# Patient Record
Sex: MALE | Race: WHITE | NOT HISPANIC OR LATINO | Employment: PART TIME | URBAN - METROPOLITAN AREA
[De-identification: names, ages, dates, MRNs, and addresses within clinical notes are randomized per-mention and may not be internally consistent; named-entity substitution may affect disease eponyms.]

---

## 2017-04-19 ENCOUNTER — TRANSFERRED RECORDS (OUTPATIENT)
Dept: HEALTH INFORMATION MANAGEMENT | Facility: CLINIC | Age: 62
End: 2017-04-19

## 2017-06-22 ENCOUNTER — OFFICE VISIT (OUTPATIENT)
Dept: INTERNAL MEDICINE | Facility: CLINIC | Age: 62
End: 2017-06-22
Payer: COMMERCIAL

## 2017-06-22 VITALS
TEMPERATURE: 97.7 F | OXYGEN SATURATION: 97 % | BODY MASS INDEX: 27.36 KG/M2 | SYSTOLIC BLOOD PRESSURE: 106 MMHG | DIASTOLIC BLOOD PRESSURE: 70 MMHG | HEIGHT: 63 IN | HEART RATE: 93 BPM | WEIGHT: 154.4 LBS

## 2017-06-22 DIAGNOSIS — Z12.11 COLON CANCER SCREENING: ICD-10-CM

## 2017-06-22 DIAGNOSIS — Z00.00 ENCOUNTER FOR ROUTINE ADULT HEALTH EXAMINATION WITHOUT ABNORMAL FINDINGS: Primary | ICD-10-CM

## 2017-06-22 DIAGNOSIS — N52.9 IMPOTENCE OF ORGANIC ORIGIN: ICD-10-CM

## 2017-06-22 DIAGNOSIS — Z12.5 SPECIAL SCREENING FOR MALIGNANT NEOPLASM OF PROSTATE: ICD-10-CM

## 2017-06-22 DIAGNOSIS — K21.9 GASTROESOPHAGEAL REFLUX DISEASE WITHOUT ESOPHAGITIS: ICD-10-CM

## 2017-06-22 DIAGNOSIS — I10 ESSENTIAL HYPERTENSION, BENIGN: ICD-10-CM

## 2017-06-22 DIAGNOSIS — Z71.89 COUNSELING REGARDING ADVANCED DIRECTIVES: ICD-10-CM

## 2017-06-22 DIAGNOSIS — Z11.59 ENCOUNTER FOR HCV SCREENING TEST FOR LOW RISK PATIENT: ICD-10-CM

## 2017-06-22 DIAGNOSIS — E78.5 HYPERLIPIDEMIA LDL GOAL <130: ICD-10-CM

## 2017-06-22 LAB
ALBUMIN SERPL-MCNC: 4.6 G/DL (ref 3.4–5)
ALP SERPL-CCNC: 78 U/L (ref 40–150)
ALT SERPL W P-5'-P-CCNC: 43 U/L (ref 0–70)
ANION GAP SERPL CALCULATED.3IONS-SCNC: 10 MMOL/L (ref 3–14)
AST SERPL W P-5'-P-CCNC: 26 U/L (ref 0–45)
BILIRUB SERPL-MCNC: 0.6 MG/DL (ref 0.2–1.3)
BUN SERPL-MCNC: 18 MG/DL (ref 7–30)
CALCIUM SERPL-MCNC: 10.3 MG/DL (ref 8.5–10.1)
CHLORIDE SERPL-SCNC: 102 MMOL/L (ref 94–109)
CHOLEST SERPL-MCNC: 232 MG/DL
CO2 SERPL-SCNC: 24 MMOL/L (ref 20–32)
CREAT SERPL-MCNC: 0.99 MG/DL (ref 0.66–1.25)
GFR SERPL CREATININE-BSD FRML MDRD: 76 ML/MIN/1.7M2
GLUCOSE SERPL-MCNC: 151 MG/DL (ref 70–99)
HDLC SERPL-MCNC: 63 MG/DL
HGB BLD-MCNC: 13.4 G/DL (ref 13.3–17.7)
LDLC SERPL CALC-MCNC: 149 MG/DL
NONHDLC SERPL-MCNC: 169 MG/DL
POTASSIUM SERPL-SCNC: 4.1 MMOL/L (ref 3.4–5.3)
PROT SERPL-MCNC: 8.1 G/DL (ref 6.8–8.8)
PSA SERPL-ACNC: 4.61 UG/L (ref 0–4)
SODIUM SERPL-SCNC: 136 MMOL/L (ref 133–144)
TRIGL SERPL-MCNC: 102 MG/DL

## 2017-06-22 PROCEDURE — 85018 HEMOGLOBIN: CPT | Performed by: INTERNAL MEDICINE

## 2017-06-22 PROCEDURE — 80061 LIPID PANEL: CPT | Performed by: INTERNAL MEDICINE

## 2017-06-22 PROCEDURE — G0103 PSA SCREENING: HCPCS | Performed by: INTERNAL MEDICINE

## 2017-06-22 PROCEDURE — 99396 PREV VISIT EST AGE 40-64: CPT | Performed by: INTERNAL MEDICINE

## 2017-06-22 PROCEDURE — 80053 COMPREHEN METABOLIC PANEL: CPT | Performed by: INTERNAL MEDICINE

## 2017-06-22 PROCEDURE — 36415 COLL VENOUS BLD VENIPUNCTURE: CPT | Performed by: INTERNAL MEDICINE

## 2017-06-22 RX ORDER — SILDENAFIL 100 MG/1
100 TABLET, FILM COATED ORAL DAILY PRN
Qty: 12 TABLET | Refills: 3 | Status: SHIPPED | OUTPATIENT
Start: 2017-06-22 | End: 2018-06-25

## 2017-06-22 RX ORDER — LOSARTAN POTASSIUM AND HYDROCHLOROTHIAZIDE 12.5; 1 MG/1; MG/1
1 TABLET ORAL DAILY
Qty: 90 TABLET | Refills: 3 | Status: SHIPPED | OUTPATIENT
Start: 2017-06-22 | End: 2018-06-24

## 2017-06-22 RX ORDER — AMLODIPINE BESYLATE 10 MG/1
10 TABLET ORAL DAILY
Qty: 90 TABLET | Refills: 3 | Status: SHIPPED | OUTPATIENT
Start: 2017-06-22 | End: 2018-06-24

## 2017-06-22 RX ORDER — GEMFIBROZIL 600 MG/1
600 TABLET, FILM COATED ORAL 2 TIMES DAILY
Qty: 180 TABLET | Refills: 3 | Status: SHIPPED | OUTPATIENT
Start: 2017-06-22 | End: 2018-06-24

## 2017-06-22 NOTE — MR AVS SNAPSHOT
After Visit Summary   6/22/2017    Deven Hernandes    MRN: [id number]           Patient Information     Date Of Birth          1955        Visit Information        Provider Department      6/22/2017 6:40 AM Garrett Clifford MD [facility]        Today's Diagnoses     Encounter for routine adult health examination without abnormal findings    -  1    BENIGN HYPERTENSION        Hyperlipidemia LDL goal <130        Gastroesophageal reflux disease without esophagitis        Special screening for malignant neoplasm of prostate        Colon cancer screening        Impotence of organic origin        Counseling regarding advanced directives          Care Instructions      Preventive Health Recommendations  Male Ages 50 - 64    Yearly exam:             See your health care provider every year in order to  o   Review health changes.   o   Discuss preventive care.    o   Review your medicines if your doctor has prescribed any.     Have a cholesterol test every 5 years, or more frequently if you are at risk for high cholesterol/heart disease.     Have a diabetes test (fasting glucose) every three years. If you are at risk for diabetes, you should have this test more often.     Have a colonoscopy at age 50, or have a yearly FIT test (stool test). These exams will check for colon cancer.      Talk with your health care provider about whether or not a prostate cancer screening test (PSA) is right for you.    You should be tested each year for STDs (sexually transmitted diseases), if you re at risk.     Shots: Get a flu shot each year. Get a tetanus shot every 10 years.     Nutrition:    Eat at least 5 servings of fruits and vegetables daily.     Eat whole-grain bread, whole-wheat pasta and brown rice instead of white grains and rice.     Talk to your provider about Calcium and Vitamin D.     Lifestyle    Exercise for at least 150 minutes a week (30 minutes a day, 5 days a week). This  "will help you control your weight and prevent disease.     Limit alcohol to one drink per day.     No smoking.     Wear sunscreen to prevent skin cancer.     See your dentist every six months for an exam and cleaning.     See your eye doctor every 1 to 2 years.            Follow-ups after your visit        Who to contact     If you have questions or need follow up information about today's clinic visit or your schedule please contact [facility] directly at [phone].  Normal or non-critical lab and imaging results will be communicated to you by [employer], letter or phone within 4 business days after the clinic has received the results. If you do not hear from us within 7 days, please contact the clinic through [employer] or phone. If you have a critical or abnormal lab result, we will notify you by phone as soon as possible.  Submit refill requests through [employer] or call your pharmacy and they will forward the refill request to us. Please allow 3 business days for your refill to be completed.          Additional Information About Your Visit        [employer] Information     [employer] gives you secure access to your electronic health record. If you see a primary care provider, you can also send messages to your care team and make appointments. If you have questions, please call your primary care clinic.  If you do not have a primary care provider, please call [phone] and they will assist you.        Care EveryWhere ID     This is your Care EveryWhere ID. This could be used by other organizations to access your [facility] medical records  [id number]        Your Vitals Were     Pulse Temperature Height Pulse Oximetry BMI (Body Mass Index)       93 97.7  F (36.5  C) (Oral) 5' 3\" (1.6 m) 97% 27.35 kg/m2        Blood Pressure from Last 3 Encounters:   06/22/17 106/70   06/14/16 116/78   03/17/15 128/80    Weight from Last 3 Encounters:   06/22/17 154 lb 6.4 oz (70 kg)   06/14/16 148 lb 6.4 oz (67.3 " kg)   03/17/15 149 lb 12.8 oz (67.9 kg)              We Performed the Following     Comprehensive metabolic panel     Hemoglobin     Lipid Profile     PSA, screen          Where to get your medicines      These medications were sent to [id number] - [place], [place] - [address] AT [facility] [address], [facility] [address]     Phone:  [phone]     amLODIPine 10 MG tablet    gemfibrozil 600 MG tablet    losartan-hydrochlorothiazide 100-12.5 MG per tablet    omeprazole 20 MG CR capsule         Some of these will need a paper prescription and others can be bought over the counter.  Ask your nurse if you have questions.     Bring a paper prescription for each of these medications     sildenafil 100 MG cap/tab          Primary Care Provider Office Phone # Fax #    Garrett Clifford [phone]       [facility] [address] [facility] [address]        Equal Access to Services     IVANIA CANCINO : Hadii aad ku hadasho Soomaali, waaxda luqadaha, qaybta kaalmada adeegyada, waxay idiin hayalexn rolly sims . So [facility] [phone].    ATENCIÓN: Si habla español, tiene a arevalo disposición servicios gratuitos de asistencia lingüística. Llame al [phone].    We comply with applicable federal civil rights laws and [place] laws. We do not discriminate on the basis of race, color, national origin, age, disability sex, sexual orientation or gender identity.            Thank you!     Thank you for choosing [facility]  for your care. Our goal is always to provide you with excellent care. Hearing back from our patients is one way we can continue to improve our services. Please take a few minutes to complete the written survey that you may receive in the mail after your visit with us. Thank you!             Your Updated Medication List - Protect others around you: Learn how to safely use, store and throw away your  medicines at www.disposemymeds.org.          This list is accurate as of: 6/22/17  7:01 AM.  Always use your most recent med list.                   Brand Name Dispense Instructions for use Diagnosis    amLODIPine 10 MG tablet    NORVASC    90 tablet    Take 1 tablet (10 mg) by mouth daily    Essential hypertension, benign       aspirin 81 MG tablet     0    1 tab po QD (Once per day)        gemfibrozil 600 MG tablet    LOPID    180 tablet    Take 1 tablet (600 mg) by mouth 2 times daily    Hyperlipidemia LDL goal <130       losartan-hydrochlorothiazide 100-12.5 MG per tablet    HYZAAR    90 tablet    Take 1 tablet by mouth daily    Essential hypertension, benign       METAMUCIL 30.9 % Powd   Generic drug:  psyllium     0    as directed        MULTIVITAMIN CHEW   OR      1 tablet qd        omeprazole 20 MG CR capsule    priLOSEC    90 capsule    ONE DAILY    Gastroesophageal reflux disease without esophagitis       polyethylene glycol powder    MIRALAX     Take 17 g by mouth daily. for constipation    Chronic constipation       sildenafil 100 MG cap/tab    VIAGRA    12 tablet    Take 1 tablet (100 mg) by mouth daily as needed for erectile dysfunction    Impotence of organic origin

## 2017-06-22 NOTE — NURSING NOTE
"Chief Complaint   Patient presents with     Physical       Initial /70  Pulse 93  Temp 97.7  F (36.5  C) (Oral)  Ht 5' 3\" (1.6 m)  Wt 154 lb 6.4 oz (70 kg)  SpO2 97%  BMI 27.35 kg/m2 Estimated body mass index is 27.35 kg/(m^2) as calculated from the following:    Height as of this encounter: 5' 3\" (1.6 m).    Weight as of this encounter: 154 lb 6.4 oz (70 kg).  Medication Reconciliation: complete   Lisbeth Nicole CMA      "

## 2017-06-22 NOTE — LETTER
[facility]  [address]  [address]      June 22, 2017      Deven Hernandes  [address] [facility] [address]          Dear Deven,      I have enclosed a copy of your most recent labs done here at the clinic and if available some of your prior labs for comparison.     I am pleased to inform you that your routine blood work including your hemoglobin, sodium, potassium, kidney and liver function tests are all normal.    Your blood sugar level is elevated and needs to be addressed as at its current level is consistent with a potential diagnosis of diabetes and could be better controlled.  Please follow-up in the clinic to discuss some changes that need to be done.    Your PSA test is also elevated higher than what I would consider to be normal and I would ask you follow-up to discuss this further.    Your cholesterol is slightly high and could be treated more aggressively.  Please follow-up with me to discuss your further options if you are interested.    Please call me if you have further questions.        Garrett Clifford MD

## 2017-06-22 NOTE — PROGRESS NOTES
SUBJECTIVE:     CC: Deven Hernandes is an 61 year old male who presents for preventative health visit.     Answers for HPI/ROS submitted by the patient on 6/21/2017   Annual Exam:  Getting at least 3 servings of Calcium per day:: Yes  Bi-annual eye exam:: Yes  Dental care twice a year:: Yes  Sleep apnea or symptoms of sleep apnea:: Daytime drowsiness  Diet:: Regular (no restrictions)  Frequency of exercise:: 2-3 days/week  Taking medications regularly:: Yes  Medication side effects:: Not applicable  Additional concerns today:: YES  PHQ-2 Score: 0  Duration of exercise:: 15-30 minutes    PROBLEMS TO ADD ON...    1. Discuss dietary supplements  2. Needs hard copy of Viagra RX    Today's PHQ-2 Score:   PHQ-2 ( 1999 Pfizer) 6/21/2017 6/14/2016   Q1: Little interest or pleasure in doing things 0 0   Q2: Feeling down, depressed or hopeless 0 0   PHQ-2 Score 0 0   Q1: Little interest or pleasure in doing things Not at all -   Q2: Feeling down, depressed or hopeless Not at all -   PHQ-2 Score 0 -       Abuse: Current or Past(Physical, Sexual or Emotional)- No  Do you feel safe in your environment - Yes    Social History   Substance Use Topics     Smoking status: Former Smoker     Packs/day: 0.25     Years: 20.00     Types: Cigarettes     Quit date: 11/17/2003     Smokeless tobacco: Never Used     Alcohol use Yes      Comment: socially     The patient does not drink >3 drinks per day nor >7 drinks per week.    Last PSA:   PSA   Date Value Ref Range Status   06/14/2016 3.70 0 - 4 ug/L Final       Recent Labs   Lab Test  06/14/16   0658  10/15/14   0840  07/15/13   0956   CHOL  199  178  210*   HDL  53  49  59   LDL  126*  108  130*   TRIG  98  105  105   CHOLHDLRATIO   --   3.6  3.6   NHDL  146*   --    --        Reviewed orders with patient. Reviewed health maintenance and updated orders accordingly - Yes    Reviewed and updated as needed this visit by clinical staff  Tobacco  Med Hx  Surg Hx  Fam Hx  Soc Hx     "    Reviewed and updated as needed this visit by Provider         ROS:  C: NEGATIVE for fever, chills, change in weight  I: NEGATIVE for worrisome rashes, moles or lesions  E: NEGATIVE for vision changes or irritation  ENT: NEGATIVE for ear, mouth and throat problems  R: NEGATIVE for significant cough or SOB  CV: NEGATIVE for chest pain, palpitations or peripheral edema  GI: NEGATIVE for nausea, abdominal pain, heartburn, or change in bowel habits   male: negative for dysuria, hematuria, decreased urinary stream, erectile dysfunction, urethral discharge  M: NEGATIVE for significant arthralgias or myalgia  N: NEGATIVE for weakness, dizziness or paresthesias  P: NEGATIVE for changes in mood or affect      OBJECTIVE:     /70  Pulse 93  Temp 97.7  F (36.5  C) (Oral)  Ht 5' 3\" (1.6 m)  Wt 154 lb 6.4 oz (70 kg)  SpO2 97%  BMI 27.35 kg/m2  EXAM:  GENERAL: healthy, alert and no distress  EYES: Eyes grossly normal to inspection, PERRL and conjunctivae and sclerae normal  HENT: ear canals and TM's normal, nose and mouth without ulcers or lesions  NECK: no adenopathy, no asymmetry, masses, or scars and thyroid normal to palpation  RESP: lungs clear to auscultation - no rales, rhonchi or wheezes  CV: regular rate and rhythm, normal S1 S2, no S3 or S4, no murmur, click or rub, no peripheral edema and peripheral pulses strong  ABDOMEN: soft, nontender, no hepatosplenomegaly, no masses and bowel sounds normal  RECTAL: normal sphincter tone, no rectal masses, prostate normal size, smooth, nontender without nodules or masses  MS: no gross musculoskeletal defects noted, no edema  NEURO: No focal changes  PSYCH: mentation appears normal, affect normal/bright    ASSESSMENT/PLAN:     1. Encounter for routine adult health examination without abnormal findings  As ordered, advised Shingles vaccination  - Hemoglobin    2. BENIGN HYPERTENSION  Stable on therapy  - losartan-hydrochlorothiazide (HYZAAR) 100-12.5 MG per tablet; " "Take 1 tablet by mouth daily  Dispense: 90 tablet; Refill: 3  - amLODIPine (NORVASC) 10 MG tablet; Take 1 tablet (10 mg) by mouth daily  Dispense: 90 tablet; Refill: 3  - Comprehensive metabolic panel  - Hemoglobin    3. Hyperlipidemia LDL goal <130  Labs as ordered  - gemfibrozil (LOPID) 600 MG tablet; Take 1 tablet (600 mg) by mouth 2 times daily  Dispense: 180 tablet; Refill: 3  - Comprehensive metabolic panel  - Lipid Profile    4. Gastroesophageal reflux disease without esophagitis  Refilled as ordered  - omeprazole (PRILOSEC) 20 MG CR capsule; ONE DAILY  Dispense: 90 capsule; Refill: 3    5. Special screening for malignant neoplasm of prostate  As screening annually   - PSA, screen    6. Colon cancer screening  noted    7. Impotence of organic origin  refilled  - sildenafil (VIAGRA) 100 MG cap/tab; Take 1 tablet (100 mg) by mouth daily as needed for erectile dysfunction  Dispense: 12 tablet; Refill: 3    8. Counseling regarding advanced directives  As noted      COUNSELING:  Reviewed preventive health counseling, as reflected in patient instructions       Regular exercise       Healthy diet/nutrition         reports that he quit smoking about 13 years ago. His smoking use included Cigarettes. He has a 5.00 pack-year smoking history. He has never used smokeless tobacco.    Estimated body mass index is 26.29 kg/(m^2) as calculated from the following:    Height as of 6/14/16: 5' 3\" (1.6 m).    Weight as of 6/14/16: 148 lb 6.4 oz (67.3 kg).       Counseling Resources:  ATP IV Guidelines  Pooled Cohorts Equation Calculator  FRAX Risk Assessment  ICSI Preventive Guidelines  Dietary Guidelines for Americans, 2010  [employer]'s MyPlate  ASA Prophylaxis  Lung CA Screening    Garrett Clifford MD  [facility]      Answers for HPI/ROS submitted by the patient on 6/21/2017   Annual Exam:  Getting at least 3 servings of Calcium per day:: Yes  Bi-annual eye exam:: Yes  Dental care twice a year:: Yes  Sleep " apnea or symptoms of sleep apnea:: Daytime drowsiness  Diet:: Regular (no restrictions)  Frequency of exercise:: 2-3 days/week  Taking medications regularly:: Yes  Medication side effects:: Not applicable  Additional concerns today:: YES  PHQ-2 Score: 0  Duration of exercise:: 15-30 minutes

## 2018-06-25 ENCOUNTER — OFFICE VISIT (OUTPATIENT)
Dept: INTERNAL MEDICINE | Facility: CLINIC | Age: 63
End: 2018-06-25
Payer: COMMERCIAL

## 2018-06-25 VITALS
DIASTOLIC BLOOD PRESSURE: 68 MMHG | SYSTOLIC BLOOD PRESSURE: 112 MMHG | HEIGHT: 63 IN | OXYGEN SATURATION: 98 % | RESPIRATION RATE: 15 BRPM | BODY MASS INDEX: 27.82 KG/M2 | TEMPERATURE: 98.2 F | HEART RATE: 99 BPM | WEIGHT: 157 LBS

## 2018-06-25 DIAGNOSIS — I10 ESSENTIAL HYPERTENSION, BENIGN: ICD-10-CM

## 2018-06-25 DIAGNOSIS — Z12.11 COLON CANCER SCREENING: ICD-10-CM

## 2018-06-25 DIAGNOSIS — Z11.4 SCREENING FOR HIV (HUMAN IMMUNODEFICIENCY VIRUS): ICD-10-CM

## 2018-06-25 DIAGNOSIS — Z12.5 SPECIAL SCREENING FOR MALIGNANT NEOPLASM OF PROSTATE: ICD-10-CM

## 2018-06-25 DIAGNOSIS — E78.5 HYPERLIPIDEMIA LDL GOAL <130: ICD-10-CM

## 2018-06-25 DIAGNOSIS — Z11.59 ENCOUNTER FOR HCV SCREENING TEST FOR LOW RISK PATIENT: ICD-10-CM

## 2018-06-25 DIAGNOSIS — K21.9 GASTROESOPHAGEAL REFLUX DISEASE WITHOUT ESOPHAGITIS: ICD-10-CM

## 2018-06-25 DIAGNOSIS — Z00.00 ENCOUNTER FOR ROUTINE ADULT HEALTH EXAMINATION WITHOUT ABNORMAL FINDINGS: Primary | ICD-10-CM

## 2018-06-25 DIAGNOSIS — R97.20 ELEVATED PROSTATE SPECIFIC ANTIGEN (PSA): ICD-10-CM

## 2018-06-25 DIAGNOSIS — N52.9 IMPOTENCE OF ORGANIC ORIGIN: ICD-10-CM

## 2018-06-25 LAB
ALBUMIN SERPL-MCNC: 4.3 G/DL (ref 3.4–5)
ALP SERPL-CCNC: 78 U/L (ref 40–150)
ALT SERPL W P-5'-P-CCNC: 19 U/L (ref 0–70)
ANION GAP SERPL CALCULATED.3IONS-SCNC: 8 MMOL/L (ref 3–14)
AST SERPL W P-5'-P-CCNC: 13 U/L (ref 0–45)
BILIRUB SERPL-MCNC: 0.4 MG/DL (ref 0.2–1.3)
BUN SERPL-MCNC: 13 MG/DL (ref 7–30)
CALCIUM SERPL-MCNC: 8.9 MG/DL (ref 8.5–10.1)
CHLORIDE SERPL-SCNC: 101 MMOL/L (ref 94–109)
CHOLEST SERPL-MCNC: 176 MG/DL
CO2 SERPL-SCNC: 26 MMOL/L (ref 20–32)
CREAT SERPL-MCNC: 0.89 MG/DL (ref 0.66–1.25)
GFR SERPL CREATININE-BSD FRML MDRD: 86 ML/MIN/1.7M2
GLUCOSE SERPL-MCNC: 144 MG/DL (ref 70–99)
HDLC SERPL-MCNC: 42 MG/DL
HGB BLD-MCNC: 9.5 G/DL (ref 13.3–17.7)
LDLC SERPL CALC-MCNC: 102 MG/DL
NONHDLC SERPL-MCNC: 134 MG/DL
POTASSIUM SERPL-SCNC: 3.7 MMOL/L (ref 3.4–5.3)
PROT SERPL-MCNC: 7.5 G/DL (ref 6.8–8.8)
PSA SERPL-ACNC: 4.08 UG/L (ref 0–4)
SODIUM SERPL-SCNC: 135 MMOL/L (ref 133–144)
TRIGL SERPL-MCNC: 161 MG/DL

## 2018-06-25 PROCEDURE — 85018 HEMOGLOBIN: CPT | Performed by: INTERNAL MEDICINE

## 2018-06-25 PROCEDURE — 80061 LIPID PANEL: CPT | Performed by: INTERNAL MEDICINE

## 2018-06-25 PROCEDURE — G0103 PSA SCREENING: HCPCS | Performed by: INTERNAL MEDICINE

## 2018-06-25 PROCEDURE — 36415 COLL VENOUS BLD VENIPUNCTURE: CPT | Performed by: INTERNAL MEDICINE

## 2018-06-25 PROCEDURE — 99213 OFFICE O/P EST LOW 20 MIN: CPT | Mod: 25 | Performed by: INTERNAL MEDICINE

## 2018-06-25 PROCEDURE — 87389 HIV-1 AG W/HIV-1&-2 AB AG IA: CPT | Performed by: INTERNAL MEDICINE

## 2018-06-25 PROCEDURE — 99396 PREV VISIT EST AGE 40-64: CPT | Performed by: INTERNAL MEDICINE

## 2018-06-25 PROCEDURE — 80053 COMPREHEN METABOLIC PANEL: CPT | Performed by: INTERNAL MEDICINE

## 2018-06-25 PROCEDURE — 86803 HEPATITIS C AB TEST: CPT | Performed by: INTERNAL MEDICINE

## 2018-06-25 RX ORDER — LOSARTAN POTASSIUM AND HYDROCHLOROTHIAZIDE 12.5; 1 MG/1; MG/1
1 TABLET ORAL DAILY
Qty: 90 TABLET | Refills: 3 | Status: SHIPPED | OUTPATIENT
Start: 2018-06-25 | End: 2019-03-22

## 2018-06-25 RX ORDER — SILDENAFIL 100 MG/1
100 TABLET, FILM COATED ORAL DAILY PRN
Qty: 16 TABLET | Refills: 5 | Status: SHIPPED | OUTPATIENT
Start: 2018-06-25 | End: 2019-10-22

## 2018-06-25 RX ORDER — GEMFIBROZIL 600 MG/1
600 TABLET, FILM COATED ORAL 2 TIMES DAILY
Qty: 180 TABLET | Refills: 3 | Status: ON HOLD | OUTPATIENT
Start: 2018-06-25 | End: 2018-07-31

## 2018-06-25 RX ORDER — AMLODIPINE BESYLATE 10 MG/1
10 TABLET ORAL DAILY
Qty: 90 TABLET | Refills: 3 | Status: SHIPPED | OUTPATIENT
Start: 2018-06-25 | End: 2019-03-22

## 2018-06-25 ASSESSMENT — PAIN SCALES - GENERAL: PAINLEVEL: NO PAIN (0)

## 2018-06-25 NOTE — LETTER
[address] [address]  [phone]      6/26/2018       Deven Hernandes  [address] [facility] [address]        Dear Deven,    I am pleased to inform you that your routine blood work including your HIV and Hepatitis C screen, sodium, potassium, calcium, kidney and liver function tests are all normal.    Your hemoglobin function tests is abnormal and low and should be rechecked here in the clinic with a follow-up visit with me.  I will look forward to seeing you at that time and please call to make an appointment.    Your triglyceride level is slightly high and could be treated more aggressively with better diet and exercise.      Your PSA test is still elevated higher than what I would consider to be normal but better and I would ask you follow-up to discuss this further.    Your blood sugar level is elevated and needs to be addressed as at its current level is consistent with a potential diagnosis of diabetes and could be better controlled.       Sincerely,      Garrett Clifford MD  Internal Medicine

## 2018-06-25 NOTE — PROGRESS NOTES
SUBJECTIVE:   CC: Deven Hernandes is an 62 year old male who presents for preventative health visit.     Answers for HPI/ROS submitted by the patient on 6/24/2018   Annual Exam:  Getting at least 3 servings of Calcium per day:: Yes  Bi-annual eye exam:: Yes  Dental care twice a year:: Yes  Sleep apnea or symptoms of sleep apnea:: Daytime drowsiness  Diet:: Regular (no restrictions)  Frequency of exercise:: 4-5 days/week  Taking medications regularly:: No  Medication side effects:: Other  Additional concerns today:: YES  PHQ-2 Score: 0  Duration of exercise:: 15-30 minutes  Barriers to taking medications:: Side effects    Additional concerns:  D/C gemfibrozil because of GI upset    Today's PHQ-2 Score:   PHQ-2 ( 1999 Pfizer) 6/24/2018 6/22/2017   Q1: Little interest or pleasure in doing things 0 0   Q2: Feeling down, depressed or hopeless 0 0   PHQ-2 Score 0 0   Q1: Little interest or pleasure in doing things Not at all -   Q2: Feeling down, depressed or hopeless Not at all -   PHQ-2 Score 0 -       Abuse: Current or Past(Physical, Sexual or Emotional)- No  Do you feel safe in your environment - yes    Social History   Substance Use Topics     Smoking status: Former Smoker     Packs/day: 0.25     Years: 20.00     Types: Cigarettes     Quit date: 11/17/2003     Smokeless tobacco: Never Used     Alcohol use Yes      Comment: socially      If you drink alcohol do you typically have >3 drinks per day or >7 drinks per week? No                      Last PSA:   PSA   Date Value Ref Range Status   06/22/2017 4.61 (H) 0 - 4 ug/L Final     Comment:     Assay Method:  Chemiluminescence using Siemens Vista analyzer       Reviewed orders with patient. Reviewed health maintenance and updated orders accordingly - Yes    Reviewed and updated as needed this visit by clinical staff         Reviewed and updated as needed this visit by Provider        ROS:  CONSTITUTIONAL: NEGATIVE for fever, chills, change in  "weight  INTEGUMENTARY/SKIN: NEGATIVE for worrisome rashes, moles or lesions  EYES: NEGATIVE for vision changes or irritation  ENT: NEGATIVE for ear, mouth and throat problems  RESP: NEGATIVE for significant cough or SOB  CV: NEGATIVE for chest pain, palpitations or peripheral edema  GI: NEGATIVE for nausea, abdominal pain, heartburn, or change in bowel habits   male: negative for dysuria, hematuria, slight decreased urinary stream, erectile dysfunction, urethral discharge  MUSCULOSKELETAL: NEGATIVE for significant arthralgias or myalgia  NEURO: NEGATIVE for weakness, dizziness or paresthesias  PSYCHIATRIC: NEGATIVE for changes in mood or affect    OBJECTIVE:   /68  Pulse 99  Temp 98.2  F (36.8  C) (Oral)  Resp 15  Ht 5' 3\" (1.6 m)  Wt 157 lb (71.2 kg)  SpO2 98%  BMI 27.81 kg/m2  EXAM:  GENERAL: healthy, alert and no distress  EYES: Eyes grossly normal to inspection, PERRL and conjunctivae and sclerae normal  HENT: ear canals and TM's normal, nose and mouth without ulcers or lesions  NECK: no adenopathy, no asymmetry, masses, or scars and thyroid normal to palpation  RESP: lungs clear to auscultation - no rales, rhonchi or wheezes  CV: regular rate and rhythm, normal S1 S2, no S3 or S4, no click or rub, no peripheral edema and peripheral pulses strong  ABDOMEN: soft, nontender, no hepatosplenomegaly, no masses and bowel sounds normal  RECTAL: normal sphincter tone, no rectal masses, prostate increased size, smooth, nontender without nodules or masses  MS: no gross musculoskeletal defects noted, no edema  NEURO: Normal strength and tone, mentation intact and speech normal  PSYCH: mentation appears normal, affect normal/bright    ASSESSMENT/PLAN:   1. Encounter for routine adult health examination without abnormal findings  Advised shingles vaccination  - Hemoglobin  - Comprehensive metabolic panel  - Lipid Profile    2. Essential hypertension, benign  Stable on current therapy  - Comprehensive metabolic " "panel  - amLODIPine (NORVASC) 10 MG tablet; Take 1 tablet (10 mg) by mouth daily  Dispense: 90 tablet; Refill: 3  - losartan-hydrochlorothiazide (HYZAAR) 100-12.5 MG per tablet; Take 1 tablet by mouth daily  Dispense: 90 tablet; Refill: 3    3. Hyperlipidemia LDL goal <130  Holding gemfibrozil now to question intolerance, recheck labs pending consider diet  - Lipid Profile  - gemfibrozil (LOPID) 600 MG tablet; Take 1 tablet (600 mg) by mouth 2 times daily  Dispense: 180 tablet; Refill: 3    4. Special screening for malignant neoplasm of prostate  Ordered as routine screening follow  - PSA, screen    5. Gastroesophageal reflux disease without esophagitis  Refilled per record  - omeprazole (PRILOSEC) 20 MG CR capsule; ONE DAILY  Dispense: 90 capsule; Refill: 3    6. Encounter for HCV screening test for low risk patient  Done per screening per age  - **Hepatitis C Screen Reflex to RNA FUTURE anytime    7. Colon cancer screening  Prior colonoscopy reviewed  - Fecal colorectal cancer screen (FIT); Future    8. Screening for HIV (human immunodeficiency virus)  Teen screening recommend  - HIV Screening    9. Elevated prostate specific antigen (PSA)  See prior PSA and letter sent, patient did not follow-up consider urology referral  - UROLOGY ADULT REFERRAL  - OFFICE/OUTPT VISIT,EST,LEVL II    10. Impotence of organic origin  Refilled per request, patient gets prescription in [place]  - sildenafil (VIAGRA) 100 MG tablet; Take 1 tablet (100 mg) by mouth daily as needed  Dispense: 16 tablet; Refill: 5    COUNSELING:  Reviewed preventive health counseling, as reflected in patient instructions       Regular exercise       Healthy diet/nutrition       reports that he quit smoking about 14 years ago. His smoking use included Cigarettes. He has a 5.00 pack-year smoking history. He has never used smokeless tobacco.    Estimated body mass index is 27.35 kg/(m^2) as calculated from the following:    Height as of 6/22/17: 5' 3\" (1.6 " m).    Weight as of 6/22/17: 154 lb 6.4 oz (70 kg).       Counseling Resources:  ATP IV Guidelines  Pooled Cohorts Equation Calculator  FRAX Risk Assessment  ICSI Preventive Guidelines  Dietary Guidelines for Americans, 2010  USDA's MyPlate  ASA Prophylaxis  Lung CA Screening    Garrett Clifford MD  [facility]    THE MEDICATION LIST HAS BEEN FULLY RECONCILED BY THE M.D. AND THE NURSING STAFF.

## 2018-06-25 NOTE — MR AVS SNAPSHOT
After Visit Summary   6/25/2018    Deven Hernandes    MRN: [id number]           Patient Information     Date Of Birth          1955        Visit Information        Provider Department      6/25/2018 7:20 AM Garrett Clifford MD [facility]        Today's Diagnoses     Encounter for routine adult health examination without abnormal findings    -  1    Essential hypertension, benign        Hyperlipidemia LDL goal <130        Special screening for malignant neoplasm of prostate        Gastroesophageal reflux disease without esophagitis        Encounter for HCV screening test for low risk patient        Colon cancer screening        Screening for HIV (human immunodeficiency virus)        Elevated prostate specific antigen (PSA)          Care Instructions      Preventive Health Recommendations  Male Ages 50 - 64    Yearly exam:             See your health care provider every year in order to  o   Review health changes.   o   Discuss preventive care.    o   Review your medicines if your doctor has prescribed any.     Have a cholesterol test every 5 years, or more frequently if you are at risk for high cholesterol/heart disease.     Have a diabetes test (fasting glucose) every three years. If you are at risk for diabetes, you should have this test more often.     Have a colonoscopy at age 50, or have a yearly FIT test (stool test). These exams will check for colon cancer.      Talk with your health care provider about whether or not a prostate cancer screening test (PSA) is right for you.    You should be tested each year for STDs (sexually transmitted diseases), if you re at risk.     Shots: Get a flu shot each year. Get a tetanus shot every 10 years.     Nutrition:    Eat at least 5 servings of fruits and vegetables daily.     Eat whole-grain bread, whole-wheat pasta and brown rice instead of white grains and rice.     Talk to your provider about Calcium and Vitamin D.      Lifestyle    Exercise for at least 150 minutes a week (30 minutes a day, 5 days a week). This will help you control your weight and prevent disease.     Limit alcohol to one drink per day.     No smoking.     Wear sunscreen to prevent skin cancer.     See your dentist every six months for an exam and cleaning.     See your eye doctor every 1 to 2 years.            Follow-ups after your visit        Additional Services     UROLOGY ADULT REFERRAL       Your provider has referred you to: [facility]: [facility] Elisa Richmond [phone]   [email or web address]    Please be aware that coverage of these services is subject to the terms and limitations of your health insurance plan.  Call member services at your health plan with any benefit or coverage questions.      Please bring the following with you to your appointment:    (1) Any X-Rays, CTs or MRIs which have been performed.  Contact the facility where they were done to arrange for  prior to your scheduled appointment.    (2) List of current medications  (3) This referral request   (4) Any documents/labs given to you for this referral                  Future tests that were ordered for you today     Open Future Orders        Priority Expected Expires Ordered    Fecal colorectal cancer screen (FIT) Routine 7/16/2018 9/17/2018 6/25/2018            Who to contact     If you have questions or need follow up information about today's clinic visit or your schedule please contact [facility] directly at [phone].  Normal or non-critical lab and imaging results will be communicated to you by MyChart, letter or phone within 4 business days after the clinic has received the results. If you do not hear from us within 7 days, please contact the clinic through MyChart or phone. If you have a critical or abnormal lab result, we will notify you by phone as soon as possible.  Submit refill requests through [employer] or  "call your pharmacy and they will forward the refill request to us. Please allow 3 business days for your refill to be completed.          Additional Information About Your Visit        MyChart Information     [facility] gives you secure access to your electronic health record. If you see a primary care provider, you can also send messages to your care team and make appointments. If you have questions, please call your primary care clinic.  If you do not have a primary care provider, please call [phone] and they will assist you.        Care EveryWhere ID     This is your Care EveryWhere ID. This could be used by other organizations to access your [facility] medical records  [id number]        Your Vitals Were     Pulse Temperature Respirations Height Pulse Oximetry BMI (Body Mass Index)    99 98.2  F (36.8  C) (Oral) 15 5' 3\" (1.6 m) 98% 27.81 kg/m2       Blood Pressure from Last 3 Encounters:   06/25/18 112/68   06/22/17 106/70   06/14/16 116/78    Weight from Last 3 Encounters:   06/25/18 157 lb (71.2 kg)   06/22/17 154 lb 6.4 oz (70 kg)   06/14/16 148 lb 6.4 oz (67.3 kg)              We Performed the Following     **Hepatitis C Screen Reflex to RNA FUTURE anytime     Comprehensive metabolic panel     Hemoglobin     HIV Screening     Lipid Profile     OFFICE/OUTPT VISIT,EST,LEVL II     PSA, screen     UROLOGY ADULT REFERRAL          Where to get your medicines      These medications were sent to [facility] [address] [place] [address] [facility] [address]  [address] [facility] [address]     Phone:  [phone]     amLODIPine 10 MG tablet    gemfibrozil 600 MG tablet    losartan-hydrochlorothiazide 100-12.5 MG per tablet    omeprazole 20 MG CR capsule          Primary Care Provider Office Phone # Fax #    Garrett Clifford [phone]       [address] [address]        Equal Access to Services     IVANIA CANCINO AH: Rey alvarez " Sotankali, waaxda luqadaha, qaybta kaalmada peter, eduin cobb. So [facility] [phone].    ATENCIÓN: Si robert carr, tiene a arevalo disposición servicios gratuitos de asistencia lingüística. Gela al [phone].    We comply with applicable federal civil rights laws and [place] laws. We do not discriminate on the basis of race, color, national origin, age, disability, sex, sexual orientation, or gender identity.            Thank you!     Thank you for choosing [facility]  for your care. Our goal is always to provide you with excellent care. Hearing back from our patients is one way we can continue to improve our services. Please take a few minutes to complete the written survey that you may receive in the mail after your visit with us. Thank you!             Your Updated Medication List - Protect others around you: Learn how to safely use, store and throw away your medicines at www.disposemymeds.org.          This list is accurate as of 6/25/18  7:29 AM.  Always use your most recent med list.                   Brand Name Dispense Instructions for use Diagnosis    amLODIPine 10 MG tablet    NORVASC    90 tablet    Take 1 tablet (10 mg) by mouth daily    Essential hypertension, benign       aspirin 81 MG tablet     0    1 tab po QD (Once per day)        gemfibrozil 600 MG tablet    LOPID    180 tablet    Take 1 tablet (600 mg) by mouth 2 times daily    Hyperlipidemia LDL goal <130       losartan-hydrochlorothiazide 100-12.5 MG per tablet    HYZAAR    90 tablet    Take 1 tablet by mouth daily    Essential hypertension, benign       METAMUCIL 30.9 % Powd   Generic drug:  psyllium     0    as directed        MULTIVITAMIN CHEW   OR      1 tablet qd        omeprazole 20 MG CR capsule    priLOSEC    90 capsule    ONE DAILY    Gastroesophageal reflux disease without esophagitis       polyethylene glycol powder    MIRALAX     Take 17 g by mouth daily. for constipation     Chronic constipation       sildenafil 100 MG tablet    VIAGRA    12 tablet    Take 1 tablet (100 mg) by mouth daily as needed for erectile dysfunction    Impotence of organic origin

## 2018-06-26 LAB
HCV AB SERPL QL IA: NONREACTIVE
HIV 1+2 AB+HIV1 P24 AG SERPL QL IA: NONREACTIVE

## 2018-06-28 DIAGNOSIS — Z12.11 COLON CANCER SCREENING: ICD-10-CM

## 2018-06-28 PROCEDURE — 82274 ASSAY TEST FOR BLOOD FECAL: CPT | Performed by: INTERNAL MEDICINE

## 2018-07-01 LAB — HEMOCCULT STL QL IA: NEGATIVE

## 2018-07-09 ENCOUNTER — OFFICE VISIT (OUTPATIENT)
Dept: INTERNAL MEDICINE | Facility: CLINIC | Age: 63
End: 2018-07-09
Payer: COMMERCIAL

## 2018-07-09 VITALS
BODY MASS INDEX: 27.71 KG/M2 | DIASTOLIC BLOOD PRESSURE: 68 MMHG | WEIGHT: 156.4 LBS | HEART RATE: 100 BPM | RESPIRATION RATE: 14 BRPM | TEMPERATURE: 98 F | OXYGEN SATURATION: 99 % | SYSTOLIC BLOOD PRESSURE: 118 MMHG

## 2018-07-09 DIAGNOSIS — R73.9 HYPERGLYCEMIA: ICD-10-CM

## 2018-07-09 DIAGNOSIS — D50.9 IRON DEFICIENCY ANEMIA, UNSPECIFIED IRON DEFICIENCY ANEMIA TYPE: ICD-10-CM

## 2018-07-09 DIAGNOSIS — I10 ESSENTIAL HYPERTENSION, BENIGN: Primary | ICD-10-CM

## 2018-07-09 DIAGNOSIS — R97.20 ELEVATED PROSTATE SPECIFIC ANTIGEN (PSA): ICD-10-CM

## 2018-07-09 LAB
ANION GAP SERPL CALCULATED.3IONS-SCNC: 10 MMOL/L (ref 3–14)
BUN SERPL-MCNC: 17 MG/DL (ref 7–30)
CALCIUM SERPL-MCNC: 9.1 MG/DL (ref 8.5–10.1)
CHLORIDE SERPL-SCNC: 104 MMOL/L (ref 94–109)
CO2 SERPL-SCNC: 23 MMOL/L (ref 20–32)
CREAT SERPL-MCNC: 0.87 MG/DL (ref 0.66–1.25)
FERRITIN SERPL-MCNC: 3 NG/ML (ref 26–388)
GFR SERPL CREATININE-BSD FRML MDRD: 88 ML/MIN/1.7M2
GLUCOSE SERPL-MCNC: 143 MG/DL (ref 70–99)
HBA1C MFR BLD: 6.3 % (ref 0–5.6)
HGB BLD-MCNC: 9.6 G/DL (ref 13.3–17.7)
IRON SATN MFR SERPL: 2 % (ref 15–46)
IRON SERPL-MCNC: 13 UG/DL (ref 35–180)
POTASSIUM SERPL-SCNC: 3.6 MMOL/L (ref 3.4–5.3)
SODIUM SERPL-SCNC: 137 MMOL/L (ref 133–144)
TIBC SERPL-MCNC: 585 UG/DL (ref 240–430)
VIT B12 SERPL-MCNC: 400 PG/ML (ref 193–986)

## 2018-07-09 PROCEDURE — 82607 VITAMIN B-12: CPT | Performed by: INTERNAL MEDICINE

## 2018-07-09 PROCEDURE — 85018 HEMOGLOBIN: CPT | Performed by: INTERNAL MEDICINE

## 2018-07-09 PROCEDURE — 83540 ASSAY OF IRON: CPT | Performed by: INTERNAL MEDICINE

## 2018-07-09 PROCEDURE — 82728 ASSAY OF FERRITIN: CPT | Performed by: INTERNAL MEDICINE

## 2018-07-09 PROCEDURE — 83036 HEMOGLOBIN GLYCOSYLATED A1C: CPT | Performed by: INTERNAL MEDICINE

## 2018-07-09 PROCEDURE — 80048 BASIC METABOLIC PNL TOTAL CA: CPT | Performed by: INTERNAL MEDICINE

## 2018-07-09 PROCEDURE — 99214 OFFICE O/P EST MOD 30 MIN: CPT | Performed by: INTERNAL MEDICINE

## 2018-07-09 PROCEDURE — 36415 COLL VENOUS BLD VENIPUNCTURE: CPT | Performed by: INTERNAL MEDICINE

## 2018-07-09 PROCEDURE — 83550 IRON BINDING TEST: CPT | Performed by: INTERNAL MEDICINE

## 2018-07-09 ASSESSMENT — PAIN SCALES - GENERAL: PAINLEVEL: NO PAIN (0)

## 2018-07-09 NOTE — MR AVS SNAPSHOT
After Visit Summary   7/9/2018    Deven Hernandes    MRN: [id number]           Patient Information     Date Of Birth          1955        Visit Information        Provider Department      7/9/2018 7:20 AM Garrett Clifford MD [facility]        Today's Diagnoses     Essential hypertension, benign    -  1    Elevated prostate specific antigen (PSA)        Iron deficiency anemia, unspecified iron deficiency anemia type        Hyperglycemia           Follow-ups after your visit        Future tests that were ordered for you today     Open Future Orders        Priority Expected Expires Ordered    PSA, screen Routine 12/1/2018 12/31/2018 7/9/2018            Who to contact     If you have questions or need follow up information about today's clinic visit or your schedule please contact [facility] directly at [phone].  Normal or non-critical lab and imaging results will be communicated to you by MyChart, letter or phone within 4 business days after the clinic has received the results. If you do not hear from us within 7 days, please contact the clinic through MyChart or phone. If you have a critical or abnormal lab result, we will notify you by phone as soon as possible.  Submit refill requests through [employer] or call your pharmacy and they will forward the refill request to us. Please allow 3 business days for your refill to be completed.          Additional Information About Your Visit        MyChart Information     [employer] gives you secure access to your electronic health record. If you see a primary care provider, you can also send messages to your care team and make appointments. If you have questions, please call your primary care clinic.  If you do not have a primary care provider, please call [phone] and they will assist you.        Care EveryWhere ID     This is your Care EveryWhere ID. This could be used by other organizations to access  your [facility] medical records  [id number]        Your Vitals Were     Pulse Temperature Respirations Pulse Oximetry BMI (Body Mass Index)       100 98  F (36.7  C) (Oral) 14 99% 27.71 kg/m2        Blood Pressure from Last 3 Encounters:   07/09/18 118/68   06/25/18 112/68   06/22/17 106/70    Weight from Last 3 Encounters:   07/09/18 156 lb 6.4 oz (70.9 kg)   06/25/18 157 lb (71.2 kg)   06/22/17 154 lb 6.4 oz (70 kg)              We Performed the Following     Basic metabolic panel     Ferritin     Hemoglobin A1c     Hemoglobin     Iron and iron binding capacity     Vitamin B12        Primary Care Provider Office Phone # Fax #    Garrett Clifford [phone]       [address] [place] [address]        Equal Access to Services     SG CANCINO : Hadii aad ku hadasho Soomaali, waaxda luqadaha, qaybta kaalmada adeegyada, eduin tolentino hayaadenver sims . So [facility] [phone].    ATENCIÓN: Si habla español, tiene a arevalo disposición servicios gratuitos de asistencia lingüística. Llame al [phone].    We comply with applicable federal civil rights laws and [place] laws. We do not discriminate on the basis of race, color, national origin, age, disability, sex, sexual orientation, or gender identity.            Thank you!     Thank you for choosing [facility]  for your care. Our goal is always to provide you with excellent care. Hearing back from our patients is one way we can continue to improve our services. Please take a few minutes to complete the written survey that you may receive in the mail after your visit with us. Thank you!             Your Updated Medication List - Protect others around you: Learn how to safely use, store and throw away your medicines at www.disposemymeds.org.          This list is accurate as of 7/9/18  7:25 AM.  Always use your most recent med list.                   Brand Name Dispense Instructions for use Diagnosis     amLODIPine 10 MG tablet    NORVASC    90 tablet    Take 1 tablet (10 mg) by mouth daily    Essential hypertension, benign       aspirin 81 MG tablet     0    1 tab po QD (Once per day)        gemfibrozil 600 MG tablet    LOPID    180 tablet    Take 1 tablet (600 mg) by mouth 2 times daily    Hyperlipidemia LDL goal <130       losartan-hydrochlorothiazide 100-12.5 MG per tablet    HYZAAR    90 tablet    Take 1 tablet by mouth daily    Essential hypertension, benign       METAMUCIL 30.9 % Powd   Generic drug:  psyllium     0    as directed        MULTIVITAMIN CHEW   OR      1 tablet qd        omeprazole 20 MG CR capsule    priLOSEC    90 capsule    ONE DAILY    Gastroesophageal reflux disease without esophagitis       polyethylene glycol powder    MIRALAX     Take 17 g by mouth daily. for constipation    Chronic constipation       sildenafil 100 MG tablet    VIAGRA    16 tablet    Take 1 tablet (100 mg) by mouth daily as needed    Impotence of organic origin

## 2018-07-09 NOTE — PROGRESS NOTES
SUBJECTIVE:   Deven Hernandes is a 62 year old male who presents to clinic today for the following health issues:    Follow up 6/26 labs for hgb, cholesterol, PSA and glucose     Problem list and histories reviewed & adjusted, as indicated.  Additional history: as documented    Patient Active Problem List   Diagnosis     Essential hypertension, benign     Esophageal reflux     Pain in limb     Plantar fascial fibromatosis     Hyperlipidemia LDL goal <130     Hyperglycemia     Counseling regarding advanced directives     Past Surgical History:   Procedure Laterality Date     C NONSPECIFIC PROCEDURE      tonsillectomy     C NONSPECIFIC PROCEDURE  2001    nl colonoscopy     COLONOSCOPY  4-19-17     TONSILLECTOMY      as a child       Social History   Substance Use Topics     Smoking status: Former Smoker     Packs/day: 0.25     Years: 20.00     Types: Cigarettes     Quit date: 11/17/2003     Smokeless tobacco: Never Used     Alcohol use Yes      Comment: socially     Family History   Problem Relation Age of Onset     Respiratory Mother      b:1926   emphysema, ?heart problems, HTN     Hypertension Mother      Cerebrovascular Disease Mother      Cancer Father      d:age 82   colon cancer     Colon Cancer Father      Arthritis Sister      b:1950   unsure of type     Family History Negative Brother      b:1948     Diabetes Brother      Diabetes Cousin      Hypertension Brother          Current Outpatient Prescriptions   Medication Sig Dispense Refill     amLODIPine (NORVASC) 10 MG tablet Take 1 tablet (10 mg) by mouth daily 90 tablet 3     ASPIRIN 81 MG OR TABS 1 tab po QD (Once per day) 0 0     losartan-hydrochlorothiazide (HYZAAR) 100-12.5 MG per tablet Take 1 tablet by mouth daily 90 tablet 3     METAMUCIL 30.9 % OR POWD as directed 0 0     MULTIVITAMIN CHEW   OR 1 tablet qd       omeprazole (PRILOSEC) 20 MG CR capsule ONE DAILY 90 capsule 3     polyethylene glycol (MIRALAX) powder Take 17 g by mouth daily. for  constipation       sildenafil (VIAGRA) 100 MG tablet Take 1 tablet (100 mg) by mouth daily as needed 16 tablet 5     gemfibrozil (LOPID) 600 MG tablet Take 1 tablet (600 mg) by mouth 2 times daily (Patient not taking: Reported on 7/9/2018) 180 tablet 3     Allergies   Allergen Reactions     Amoxicillin      Codeine      gi     Fish Oil      3000mg --> heartburn     Lansoprazole      diarrhea     Lisinopril      cough     Niacin      heartburn     Penicillins      Verapamil      Constipation       BP Readings from Last 3 Encounters:   06/25/18 112/68   06/22/17 106/70   06/14/16 116/78    Wt Readings from Last 3 Encounters:   06/25/18 157 lb (71.2 kg)   06/22/17 154 lb 6.4 oz (70 kg)   06/14/16 148 lb 6.4 oz (67.3 kg)                    Reviewed and updated as needed this visit by clinical staff       Reviewed and updated as needed this visit by Provider         ROS:  CONSTITUTIONAL: NEGATIVE for fever, chills, change in weight  ENT/MOUTH: NEGATIVE for ear, mouth and throat problems  RESP: NEGATIVE for significant cough or SOB  CV: NEGATIVE for chest pain, palpitations or peripheral edema  GI: NEGATIVE for nausea, abdominal pain, heartburn, or change in bowel habits  : NEGATIVE for frequency, dysuria, or hematuria  MUSCULOSKELETAL: NEGATIVE for significant arthralgias or myalgia  NEURO: NEGATIVE for weakness, dizziness or paresthesias  ENDOCRINE: NEGATIVE for temperature intolerance, skin/hair changes  HEME: NEGATIVE for bleeding problems  PSYCHIATRIC: NEGATIVE for changes in mood or affect    OBJECTIVE:                                                    /68  Pulse 100  Temp 98  F (36.7  C) (Oral)  Resp 14  Wt 156 lb 6.4 oz (70.9 kg)  SpO2 99%  BMI 27.71 kg/m2  Body mass index is 27.71 kg/(m^2).  GENERAL: healthy, alert and no distress  EYES: Eyes grossly normal to inspection, extraocular movements - intact, and PERRL  HENT: ear canals- normal; TMs- normal; Nose- normal; Mouth- no ulcers, no  lesions  NECK: no tenderness, no adenopathy, no asymmetry, no masses, no stiffness; thyroid- normal to palpation  RESP: lungs clear to auscultation - no rales, no rhonchi, no wheezes  CV: regular rates and rhythm, normal S1 S2, no S3 or S4 and no murmur, no click or rub -  MS: extremities- no gross deformities noted, no edema  PSYCH: Alert and oriented times 3; speech- coherent , normal rate and volume; able to articulate logical thoughts, able to abstract reason, no tangential thoughts, no hallucinations or delusions, affect- normal     ASSESSMENT/PLAN:                                                      (I10) Essential hypertension, benign  (primary encounter diagnosis)  Comment: Stable on current therapy continue with medical management as ordered  Plan:     (R97.20) Elevated prostate specific antigen (PSA)  Comment: PSA appears stable if not slightly improved but still above reference range.  I did discuss options with the patient which include continued following with a recheck preferably in 6 months versus a urology referral for further discussion.  Plan: PSA, screen        He prefers to recheck in 6 months thus a PSA was ordered as future.    (D50.9) Iron deficiency anemia, unspecified iron deficiency anemia type  Comment: No obvious source for depressed hemoglobin from baseline.  Patient has had a recent negative FIT test, and updated colonoscopy and no obvious source or complaints of GI loss.  Plan: Hemoglobin, Ferritin, Iron and iron binding         capacity, Vitamin B12        We will recheck his labs again along with iron studies.    (R73.9) Hyperglycemia  Comment: Noted elevated and recheck fasting levels as ordered  Plan: Hemoglobin A1c, Basic metabolic panel        Discussed with patient      See Patient Instructions    Garrett Clifford MD  [facility]    THE MEDICATION LIST HAS BEEN FULLY RECONCILED BY THE M.D. AND THE NURSING STAFF.    25 minutes spent with this patient,  face to face, discussing treatment options for listed problems above as well as side effects of appropriate medications.  Counseling time extended beyond 50% of the clinic visit.  Medication dosing, treatment plan and follow-up were discussed. Also reviewed need for primary care testing for patient.

## 2018-07-09 NOTE — LETTER
[facility]  [address] [address]  [phone]      7/10/2018       Deven Hernandes  [address] [facility] [address]        Dear Deven,    As per our telephone conversation your hemoglobin and iron studies are consistent with iron deficiency and need to be further evaluated.  You should be getting a call to schedule the upper endoscopy for better evaluation of your upper GI tract.    Please make sure you contact the diabetes educators and start monitoring your blood sugars as we need to watch this closely to make sure your blood sugars do not worsen as further medical therapy may be warranted.    I would like to see you after your endoscopy so we can recheck your labs and discuss more about your blood sugars so once that is scheduled please schedule a follow-up appointment with me about a week later.    Sincerely,      Garrett Clifford MD  Internal Medicine

## 2018-07-10 ENCOUNTER — TELEPHONE (OUTPATIENT)
Dept: INTERNAL MEDICINE | Facility: CLINIC | Age: 63
End: 2018-07-10

## 2018-07-10 RX ORDER — BLOOD-GLUCOSE CONTROL, NORMAL
EACH MISCELLANEOUS
Qty: 1 EACH | Refills: 0 | Status: SHIPPED | OUTPATIENT
Start: 2018-07-10 | End: 2023-02-06

## 2018-07-10 RX ORDER — FERROUS SULFATE 325(65) MG
325 TABLET ORAL 2 TIMES DAILY
Qty: 60 TABLET | Refills: 2 | Status: SHIPPED | OUTPATIENT
Start: 2018-07-10 | End: 2018-08-15 | Stop reason: ALTCHOICE

## 2018-07-10 NOTE — TELEPHONE ENCOUNTER
I called patient and informed him of the results of his blood work indicating what appears to be an iron deficiency anemia in the setting of an A1c that is slightly more prominent and a diagnosis consistent with potential adult onset diabetes.  I advised the patient that he should be started on some supplemental iron and that we should pursue this with a further gastrointestinal evaluation thus an upper GI assessment has been ordered.  I also discussed the needs for monitoring of his blood sugars as well as a diabetic educator referral.  It is hopeful that we may be able to manage this with diet alone.  He requested that the iron supplementation be faxed to his pharmacy but that written prescriptions to be sent to his home for the testing supplies so he can shop around for best cost options

## 2018-07-16 DIAGNOSIS — D50.9 IRON DEFICIENCY ANEMIA, UNSPECIFIED IRON DEFICIENCY ANEMIA TYPE: ICD-10-CM

## 2018-07-16 DIAGNOSIS — R97.20 ELEVATED PROSTATE SPECIFIC ANTIGEN (PSA): ICD-10-CM

## 2018-07-16 LAB
PSA SERPL-ACNC: 3.45 UG/L (ref 0–4)
RETICS # AUTO: 128.4 10E9/L (ref 25–95)
RETICS/RBC NFR AUTO: 2.8 % (ref 0.5–2)

## 2018-07-16 PROCEDURE — G0103 PSA SCREENING: HCPCS | Performed by: INTERNAL MEDICINE

## 2018-07-16 PROCEDURE — 85045 AUTOMATED RETICULOCYTE COUNT: CPT | Performed by: INTERNAL MEDICINE

## 2018-07-16 PROCEDURE — 00000402 ZZHCL STATISTIC TOTAL PROTEIN: Performed by: INTERNAL MEDICINE

## 2018-07-16 PROCEDURE — 85060 BLOOD SMEAR INTERPRETATION: CPT | Performed by: INTERNAL MEDICINE

## 2018-07-16 PROCEDURE — 84165 PROTEIN E-PHORESIS SERUM: CPT | Performed by: INTERNAL MEDICINE

## 2018-07-16 PROCEDURE — 85025 COMPLETE CBC W/AUTO DIFF WBC: CPT | Performed by: INTERNAL MEDICINE

## 2018-07-16 PROCEDURE — 36415 COLL VENOUS BLD VENIPUNCTURE: CPT | Performed by: INTERNAL MEDICINE

## 2018-07-17 LAB
ALBUMIN SERPL ELPH-MCNC: 4.4 G/DL (ref 3.7–5.1)
ALPHA1 GLOB SERPL ELPH-MCNC: 0.3 G/DL (ref 0.2–0.4)
ALPHA2 GLOB SERPL ELPH-MCNC: 0.8 G/DL (ref 0.5–0.9)
B-GLOBULIN SERPL ELPH-MCNC: 0.9 G/DL (ref 0.6–1)
COPATH REPORT: NORMAL
GAMMA GLOB SERPL ELPH-MCNC: 0.6 G/DL (ref 0.7–1.6)
M PROTEIN SERPL ELPH-MCNC: 0 G/DL
PROT PATTERN SERPL ELPH-IMP: ABNORMAL

## 2018-07-19 ENCOUNTER — ALLIED HEALTH/NURSE VISIT (OUTPATIENT)
Dept: EDUCATION SERVICES | Facility: CLINIC | Age: 63
End: 2018-07-19
Payer: COMMERCIAL

## 2018-07-19 DIAGNOSIS — R73.9 HYPERGLYCEMIA: Primary | ICD-10-CM

## 2018-07-19 LAB
DIFFERENTIAL METHOD BLD: ABNORMAL
ERYTHROCYTE [DISTWIDTH] IN BLOOD BY AUTOMATED COUNT: 21 % (ref 10–15)
HCT VFR BLD AUTO: 33.6 % (ref 40–53)
HGB BLD-MCNC: 9.9 G/DL (ref 13.3–17.7)
MCH RBC QN AUTO: 21.9 PG (ref 26.5–33)
MCHC RBC AUTO-ENTMCNC: 29.5 G/DL (ref 31.5–36.5)
MCV RBC AUTO: 74 FL (ref 78–100)
PLATELET # BLD AUTO: 406 10E9/L (ref 150–450)
RBC # BLD AUTO: 4.52 10E12/L (ref 4.4–5.9)
WBC # BLD AUTO: 11.1 10E9/L (ref 4–11)

## 2018-07-19 PROCEDURE — G0108 DIAB MANAGE TRN  PER INDIV: HCPCS

## 2018-07-19 NOTE — MR AVS SNAPSHOT
After Visit Summary   7/19/2018    Deven Hernandes    MRN: [id number]           Patient Information     Date Of Birth          1955        Visit Information        Provider Department      7/19/2018 10:30 AM  DIABETIC ED RESOURCE [facility] [place]        Today's Diagnoses     Hyperglycemia    -  1      Care Instructions    My Diabetes Care Goals:    Healthy Eating: I will eat no more than 60-75 grams of carbohydrate at meals and no more than 15-30 grams at snacks.   Monitoring: I will check blood sugars once daily in the morning before breakfast.     Follow up:  Follow-up diabetes education appointment recommended in 3-4 weeks. Call scheduling line below.      Bring blood glucose meter and logbook with you to all doctor and follow-up appointments.     [facility] for the [place] Area:  For Your Diabetes Education and Nutrition Appointments Call:  [phone]   For Diabetes Education or Nutrition Related Questions:   Phone: [phone]  E-mail: [email or web address]  Fax: [phone]   If you need a medication refill please contact your pharmacy. Please allow 3 business days for your refills to be completed.    Instructions for emailing the Diabetes Educators    If you need to communicate a non-urgent message to a Diabetes Educator via email, please send to [email or web address].    Please follow the following email guidelines:    Subject line: Secure: your clinic name (example: Secure: [facility])  In the email please include: First name, middle initial, last name and date of birth.    We will be in touch with you within one (1) business day.             Follow-ups after your visit        Your next 10 appointments already scheduled     Jul 31, 2018   Procedure with Stephen Skelton MD   [facility] ([facility])    [address] [address]   [phone]           [place]  [facility] is located at [address] [place] [address]              Who to contact     If you have questions or need follow up information about today's clinic visit or your schedule please contact [facility] directly at [phone].  Normal or non-critical lab and imaging results will be communicated to you by MyChart, letter or phone within 4 business days after the clinic has received the results. If you do not hear from us within 7 days, please contact the clinic through [employer] or phone. If you have a critical or abnormal lab result, we will notify you by phone as soon as possible.  Submit refill requests through [employer] or call your pharmacy and they will forward the refill request to us. Please allow 3 business days for your refill to be completed.          Additional Information About Your Visit        [employer] Information     [employer] gives you secure access to your electronic health record. If you see a primary care provider, you can also send messages to your care team and make appointments. If you have questions, please call your primary care clinic.  If you do not have a primary care provider, please call [phone] and they will assist you.        Care EveryWhere ID     This is your Care EveryWhere ID. This could be used by other organizations to access your [facility] medical records  [id number]         Blood Pressure from Last 3 Encounters:   07/09/18 118/68   06/25/18 112/68   06/22/17 106/70    Weight from Last 3 Encounters:   07/09/18 70.9 kg (156 lb 6.4 oz)   06/25/18 71.2 kg (157 lb)   06/22/17 70 kg (154 lb 6.4 oz)              Today, you had the following     No orders found for display       Primary Care Provider Office Phone # Fax #    Garrett Clifford [phone]       [address] [facility] [address]        Equal Access to Services     IVANIA CANCINO : Rey Garland, latoya ma, eduin bautista  randa maddoxalexdenver rolly myersaan ah. So [facility] [phone].    ATENCIÓN: Si habla bruno, tiene a arevalo disposición servicios gratuitos de asistencia lingüística. Gela al [phone].    We comply with applicable federal civil rights laws and [place] laws. We do not discriminate on the basis of race, color, national origin, age, disability, sex, sexual orientation, or gender identity.            Thank you!     Thank you for choosing [facility]  for your care. Our goal is always to provide you with excellent care. Hearing back from our patients is one way we can continue to improve our services. Please take a few minutes to complete the written survey that you may receive in the mail after your visit with us. Thank you!             Your Updated Medication List - Protect others around you: Learn how to safely use, store and throw away your medicines at www.disposemymeds.org.          This list is accurate as of 7/19/18 11:19 AM.  Always use your most recent med list.                   Brand Name Dispense Instructions for use Diagnosis    amLODIPine 10 MG tablet    NORVASC    90 tablet    Take 1 tablet (10 mg) by mouth daily    Essential hypertension, benign       aspirin 81 MG tablet     0    1 tab po QD (Once per day)        blood glucose calibration solution    no brand specified    1 each    Use to calibrate blood glucose monitor as directed.    Hyperglycemia       blood glucose lancets standard    no brand specified    100 each    Use to test blood sugar 2 times daily or as directed.    Hyperglycemia       blood glucose monitoring meter device kit    no brand specified    1 kit    Use to test blood sugar 2 times daily or as directed.    Hyperglycemia       blood glucose monitoring test strip    no brand specified    100 strip    Use to test blood sugars 2 times daily or as directed    Hyperglycemia       ferrous sulfate 325 (65 Fe) MG tablet    IRON    60 tablet    Take 1 tablet (325  mg) by mouth 2 times daily    Iron deficiency anemia, unspecified iron deficiency anemia type       gemfibrozil 600 MG tablet    LOPID    180 tablet    Take 1 tablet (600 mg) by mouth 2 times daily    Hyperlipidemia LDL goal <130       losartan-hydrochlorothiazide 100-12.5 MG per tablet    HYZAAR    90 tablet    Take 1 tablet by mouth daily    Essential hypertension, benign       METAMUCIL 30.9 % Powd   Generic drug:  psyllium     0    as directed        MULTIVITAMIN CHEW   OR      1 tablet qd        omeprazole 20 MG CR capsule    priLOSEC    90 capsule    ONE DAILY    Gastroesophageal reflux disease without esophagitis       polyethylene glycol powder    MIRALAX     Take 17 g by mouth daily. for constipation    Chronic constipation       sildenafil 100 MG tablet    VIAGRA    16 tablet    Take 1 tablet (100 mg) by mouth daily as needed    Impotence of organic origin

## 2018-07-19 NOTE — PATIENT INSTRUCTIONS
My Diabetes Care Goals:    Healthy Eating: I will eat no more than 60-75 grams of carbohydrate at meals and no more than 15-30 grams at snacks.   Monitoring: I will check blood sugars once daily in the morning before breakfast.     Follow up:  Follow-up diabetes education appointment recommended in 3-4 weeks. Call scheduling line below.      Bring blood glucose meter and logbook with you to all doctor and follow-up appointments.     [place] [facility] for the [facility] Area:  For Your Diabetes Education and Nutrition Appointments Call:  [phone]   For Diabetes Education or Nutrition Related Questions:   Phone: [phone]  E-mail: [email or web address]  Fax: [phone]   If you need a medication refill please contact your pharmacy. Please allow 3 business days for your refills to be completed.    Instructions for emailing the Diabetes Educators    If you need to communicate a non-urgent message to a Diabetes Educator via email, please send to [email or web address].    Please follow the following email guidelines:    Subject line: Secure: your clinic name (example: Secure: [facility])  In the email please include: First name, middle initial, last name and date of birth.    We will be in touch with you within one (1) business day.

## 2018-07-31 ENCOUNTER — SURGERY (OUTPATIENT)
Age: 63
End: 2018-07-31

## 2018-07-31 ENCOUNTER — HOSPITAL ENCOUNTER (OUTPATIENT)
Facility: CLINIC | Age: 63
Discharge: HOME | End: 2018-07-31
Attending: SPECIALIST | Admitting: SPECIALIST
Payer: COMMERCIAL

## 2018-07-31 VITALS
HEIGHT: 63 IN | OXYGEN SATURATION: 94 % | SYSTOLIC BLOOD PRESSURE: 114 MMHG | BODY MASS INDEX: 27.64 KG/M2 | DIASTOLIC BLOOD PRESSURE: 80 MMHG | WEIGHT: 156 LBS | RESPIRATION RATE: 17 BRPM

## 2018-07-31 LAB — UPPER GI ENDOSCOPY: NORMAL

## 2018-07-31 PROCEDURE — 88305 TISSUE EXAM BY PATHOLOGIST: CPT | Performed by: SPECIALIST

## 2018-07-31 PROCEDURE — G0500 MOD SEDAT ENDO SERVICE >5YRS: HCPCS | Performed by: SPECIALIST

## 2018-07-31 PROCEDURE — 25000128 H RX IP 250 OP 636: Performed by: SPECIALIST

## 2018-07-31 PROCEDURE — 25000125 ZZHC RX 250: Performed by: SPECIALIST

## 2018-07-31 PROCEDURE — 88305 TISSUE EXAM BY PATHOLOGIST: CPT | Mod: 26 | Performed by: SPECIALIST

## 2018-07-31 PROCEDURE — 43239 EGD BIOPSY SINGLE/MULTIPLE: CPT | Performed by: SPECIALIST

## 2018-07-31 RX ORDER — ONDANSETRON 2 MG/ML
4 INJECTION INTRAMUSCULAR; INTRAVENOUS
Status: DISCONTINUED | OUTPATIENT
Start: 2018-07-31 | End: 2018-07-31 | Stop reason: HOSPADM

## 2018-07-31 RX ORDER — FENTANYL CITRATE 50 UG/ML
INJECTION, SOLUTION INTRAMUSCULAR; INTRAVENOUS PRN
Status: DISCONTINUED | OUTPATIENT
Start: 2018-07-31 | End: 2018-07-31 | Stop reason: HOSPADM

## 2018-07-31 RX ORDER — LIDOCAINE 40 MG/G
CREAM TOPICAL
Status: DISCONTINUED | OUTPATIENT
Start: 2018-07-31 | End: 2018-07-31 | Stop reason: HOSPADM

## 2018-07-31 RX ADMIN — FENTANYL CITRATE 50 MCG: 50 INJECTION, SOLUTION INTRAMUSCULAR; INTRAVENOUS at 08:22

## 2018-07-31 RX ADMIN — TOPICAL ANESTHETIC 1 EACH: 200 SPRAY DENTAL; PERIODONTAL at 08:26

## 2018-07-31 RX ADMIN — FENTANYL CITRATE 50 MCG: 50 INJECTION, SOLUTION INTRAMUSCULAR; INTRAVENOUS at 08:27

## 2018-07-31 RX ADMIN — MIDAZOLAM 1 MG: 1 INJECTION INTRAMUSCULAR; INTRAVENOUS at 08:27

## 2018-07-31 RX ADMIN — MIDAZOLAM 1 MG: 1 INJECTION INTRAMUSCULAR; INTRAVENOUS at 08:22

## 2018-07-31 NOTE — BRIEF OP NOTE
[facility] Brief Operative Note    Pre-operative diagnosis: iron defieciecy anemia   Post-operative diagnosis reflux esophagitis, small hiatus hernia, gastric erythema, atypical duodenal mucosa     Procedure: Procedure(s):  gastroscopy - Wound Class: II-Clean Contaminated   Surgeon(s): Surgeon(s) and Role:     * Stephen Skelton MD - Primary   Estimated blood loss: * No values recorded between 7/31/2018 12:00 AM and 7/31/2018  8:48 AM *    Specimens:   ID Type Source Tests Collected by Time Destination   A :  Biopsy Small Intestine, Duodenum SURGICAL PATHOLOGY EXAM Stephen Skelton MD 7/31/2018  8:40 AM    B :  Biopsy Stomach, Antrum SURGICAL PATHOLOGY EXAM Stephen Skelton MD 7/31/2018  8:40 AM    C :  Tissue Stomach, Body SURGICAL PATHOLOGY EXAM Stephen Skelton MD 7/31/2018  8:41 AM    D : squamo-columnar junction, r/o barretts  Biopsy Gastro Esophageal Junction SURGICAL PATHOLOGY EXAM Stephen Skelton MD 7/31/2018  8:41 AM       Findings: Please see ProVation procedure note in Chart Review

## 2018-07-31 NOTE — H&P
Pre-Endoscopy History and Physical     Deven Hernandes MRN# [id number]   YOB: 1955 Age: 63 year old     Date of Procedure: 7/31/2018  Primary care provider: Garrett Clifford  Type of Endoscopy: Gastroscopy with possible biopsy, possible dilation  Reason for Procedure: anemia  Type of Anesthesia Anticipated: Conscious Sedation    HPI:    Deven is a 63 year old male who will be undergoing the above procedure.      A history and physical has been performed. The patient's medications and allergies have been reviewed. The risks and benefits of the procedure and the sedation options and risks were discussed with the patient.  All questions were answered and informed consent was obtained.      He denies a personal or family history of anesthesia complications or bleeding disorders.     Patient Active Problem List   Diagnosis     Essential hypertension, benign     Esophageal reflux     Pain in limb     Plantar fascial fibromatosis     Hyperlipidemia LDL goal <130     Hyperglycemia     Counseling regarding advanced directives        Past Medical History:   Diagnosis Date     Esophageal reflux      Essential hypertension, benign      Hyperglycemia      Hyperlipidemia      Tobacco use disorder     dced 2003        Past Surgical History:   Procedure Laterality Date     C NONSPECIFIC PROCEDURE      tonsillectomy     C NONSPECIFIC PROCEDURE  2001    nl colonoscopy     COLONOSCOPY  4-19-17     TONSILLECTOMY      as a child       Social History   Substance Use Topics     Smoking status: Former Smoker     Packs/day: 0.25     Years: 20.00     Types: Cigarettes     Quit date: 11/17/2003     Smokeless tobacco: Never Used     Alcohol use Yes      Comment: socially       Family History   Problem Relation Age of Onset     Respiratory Mother      b:1926   emphysema, ?heart problems, HTN     Hypertension Mother      Cerebrovascular Disease Mother      Cancer Father      d:age 82   colon cancer     Colon Cancer Father       Arthritis Sister      b:1950   unsure of type     Family History Negative Brother      b:1948     Diabetes Brother      Diabetes Cousin      Hypertension Brother        Prior to Admission medications    Medication Sig Start Date End Date Taking? Authorizing Provider   amLODIPine (NORVASC) 10 MG tablet Take 1 tablet (10 mg) by mouth daily 6/25/18  Yes Garrett Clifford MD   ferrous sulfate (IRON) 325 (65 Fe) MG tablet Take 1 tablet (325 mg) by mouth 2 times daily 7/10/18  Yes Garrett Clifford MD   losartan-hydrochlorothiazide (HYZAAR) 100-12.5 MG per tablet Take 1 tablet by mouth daily 6/25/18  Yes Garrett Clifford MD   omeprazole (PRILOSEC) 20 MG CR capsule ONE DAILY 6/25/18  Yes Garrett Clifford MD   ASPIRIN 81 MG OR TABS 1 tab po QD (Once per day) 3/25/03      blood glucose (NO BRAND SPECIFIED) lancets standard Use to test blood sugar 2 times daily or as directed. 7/10/18   Garrett Clifford MD   blood glucose calibration (NO BRAND SPECIFIED) solution Use to calibrate blood glucose monitor as directed. 7/10/18   Garrett Clifford MD   blood glucose monitoring (NO BRAND SPECIFIED) meter device kit Use to test blood sugar 2 times daily or as directed. 7/10/18   Garrett Clifford MD   blood glucose monitoring (NO BRAND SPECIFIED) test strip Use to test blood sugars 2 times daily or as directed 7/10/18   Garrett Clifford MD   METAMUCIL 30.9 % OR POWD as directed 3/25/03      MULTIVITAMIN CHEW   OR 1 tablet qd       polyethylene glycol (MIRALAX) powder Take 17 g by mouth daily. for constipation 7/15/13   Avery Lubin MD   sildenafil (VIAGRA) 100 MG tablet Take 1 tablet (100 mg) by mouth daily as needed 6/25/18   Garrett Clifford MD       Allergies   Allergen Reactions     Amoxicillin      Codeine      gi     Fish Oil      3000mg --> heartburn     Lansoprazole      diarrhea     Lisinopril      cough     Niacin      heartburn     Penicillins      Verapamil      Constipation          REVIEW OF SYSTEMS:   5 point ROS  "negative except as noted above in HPI, including Gen., Resp., CV, GI &  system review.    PHYSICAL EXAM:   /77  Resp 16  Ht 1.6 m (5' 3\")  Wt 70.8 kg (156 lb)  SpO2 98%  BMI 27.63 kg/m2 Estimated body mass index is 27.63 kg/(m^2) as calculated from the following:    Height as of this encounter: 1.6 m (5' 3\").    Weight as of this encounter: 70.8 kg (156 lb).   GENERAL APPEARANCE: alert, and oriented  MENTAL STATUS: alert  AIRWAY EXAM: Mallampatti Class II (visualization of the soft palate, fauces, and uvula)  RESP: lungs clear to auscultation - no rales, rhonchi or wheezes  CV: regular rates and rhythm  DIAGNOSTICS:    Not indicated    IMPRESSION   ASA Class 2 - Mild systemic disease    PLAN:   Plan for Gastroscopy with possible biopsy, possible dilation. We discussed the risks, benefits and alternatives and the patient wished to proceed.    The above has been forwarded to the consulting provider.      Signed Electronically by: Stephen Skelton  July 31, 2018          "

## 2018-08-01 LAB — COPATH REPORT: NORMAL

## 2018-08-15 ENCOUNTER — OFFICE VISIT (OUTPATIENT)
Dept: INTERNAL MEDICINE | Facility: CLINIC | Age: 63
End: 2018-08-15
Payer: COMMERCIAL

## 2018-08-15 VITALS
DIASTOLIC BLOOD PRESSURE: 68 MMHG | SYSTOLIC BLOOD PRESSURE: 106 MMHG | TEMPERATURE: 97.8 F | BODY MASS INDEX: 26.89 KG/M2 | OXYGEN SATURATION: 98 % | RESPIRATION RATE: 14 BRPM | HEART RATE: 88 BPM | WEIGHT: 151.8 LBS

## 2018-08-15 DIAGNOSIS — K21.9 GASTROESOPHAGEAL REFLUX DISEASE WITHOUT ESOPHAGITIS: ICD-10-CM

## 2018-08-15 DIAGNOSIS — D50.9 IRON DEFICIENCY ANEMIA, UNSPECIFIED IRON DEFICIENCY ANEMIA TYPE: Primary | ICD-10-CM

## 2018-08-15 LAB
ERYTHROCYTE [DISTWIDTH] IN BLOOD BY AUTOMATED COUNT: ABNORMAL % (ref 10–15)
FERRITIN SERPL-MCNC: 7 NG/ML (ref 26–388)
HCT VFR BLD AUTO: 40.5 % (ref 40–53)
HGB BLD-MCNC: 12.5 G/DL (ref 13.3–17.7)
IRON SATN MFR SERPL: 7 % (ref 15–46)
IRON SERPL-MCNC: 36 UG/DL (ref 35–180)
MCH RBC QN AUTO: 25.2 PG (ref 26.5–33)
MCHC RBC AUTO-ENTMCNC: 30.9 G/DL (ref 31.5–36.5)
MCV RBC AUTO: 82 FL (ref 78–100)
PLATELET # BLD AUTO: 305 10E9/L (ref 150–450)
RBC # BLD AUTO: 4.96 10E12/L (ref 4.4–5.9)
TIBC SERPL-MCNC: 501 UG/DL (ref 240–430)
WBC # BLD AUTO: 6.8 10E9/L (ref 4–11)

## 2018-08-15 PROCEDURE — 85027 COMPLETE CBC AUTOMATED: CPT | Performed by: INTERNAL MEDICINE

## 2018-08-15 PROCEDURE — 82728 ASSAY OF FERRITIN: CPT | Performed by: INTERNAL MEDICINE

## 2018-08-15 PROCEDURE — 99214 OFFICE O/P EST MOD 30 MIN: CPT | Performed by: INTERNAL MEDICINE

## 2018-08-15 PROCEDURE — 36415 COLL VENOUS BLD VENIPUNCTURE: CPT | Performed by: INTERNAL MEDICINE

## 2018-08-15 PROCEDURE — 83550 IRON BINDING TEST: CPT | Performed by: INTERNAL MEDICINE

## 2018-08-15 PROCEDURE — 83540 ASSAY OF IRON: CPT | Performed by: INTERNAL MEDICINE

## 2018-08-15 RX ORDER — OMEPRAZOLE 40 MG/1
40 CAPSULE, DELAYED RELEASE ORAL DAILY
Qty: 30 CAPSULE | Refills: 1 | Status: SHIPPED | OUTPATIENT
Start: 2018-08-15 | End: 2018-12-31

## 2018-08-15 RX ORDER — FERROUS GLUCONATE 324(38)MG
324 TABLET ORAL 2 TIMES DAILY WITH MEALS
Qty: 120 TABLET | Refills: 0 | Status: SHIPPED | OUTPATIENT
Start: 2018-08-15 | End: 2019-10-22

## 2018-08-15 NOTE — LETTER
[facility]  [address] [address]  [phone]      8/15/2018       Deven Hernandes  [address] [facility] [address]        Dear Deven,    Your hemoglobin and iron studies are just slightly better but still indicate the need to take the iron supplements as we discussed.  I would continue with your iron tablets and plan on rechecking these levels again in 4-6 weeks to see how you are progressing.      Sincerely,      Garrett Clifford MD  Internal Medicine

## 2018-08-15 NOTE — MR AVS SNAPSHOT
After Visit Summary   8/15/2018    Deven Hernandes    MRN: [id number]           Patient Information     Date Of Birth          1955        Visit Information        Provider Department      8/15/2018 8:40 AM Garrett Clifford MD [facility]        Today's Diagnoses     Iron deficiency anemia, unspecified iron deficiency anemia type    -  1    Gastroesophageal reflux disease without esophagitis           Follow-ups after your visit        Follow-up notes from your care team     Return if symptoms worsen or fail to improve.      Future tests that were ordered for you today     Open Future Orders        Priority Expected Expires Ordered    Ferritin Routine 10/1/2018 10/31/2018 8/15/2018    Hemoglobin Routine 10/1/2018 10/31/2018 8/15/2018            Who to contact     If you have questions or need follow up information about today's clinic visit or your schedule please contact [facility] directly at [phone].  Normal or non-critical lab and imaging results will be communicated to you by MyChart, letter or phone within 4 business days after the clinic has received the results. If you do not hear from us within 7 days, please contact the clinic through [employer] or phone. If you have a critical or abnormal lab result, we will notify you by phone as soon as possible.  Submit refill requests through [employer] or call your pharmacy and they will forward the refill request to us. Please allow 3 business days for your refill to be completed.          Additional Information About Your Visit        MyChart Information     [employer] gives you secure access to your electronic health record. If you see a primary care provider, you can also send messages to your care team and make appointments. If you have questions, please call your primary care clinic.  If you do not have a primary care provider, please call [phone] and they will assist you.        Care  EveryWhere ID     This is your Care EveryWhere ID. This could be used by other organizations to access your [place] medical records  [id number]        Your Vitals Were     Pulse Temperature Respirations Pulse Oximetry BMI (Body Mass Index)       88 97.8  F (36.6  C) (Oral) 14 98% 26.89 kg/m2        Blood Pressure from Last 3 Encounters:   08/15/18 106/68   07/31/18 114/80   07/09/18 118/68    Weight from Last 3 Encounters:   08/15/18 151 lb 12.8 oz (68.9 kg)   07/31/18 156 lb (70.8 kg)   07/09/18 156 lb 6.4 oz (70.9 kg)              We Performed the Following     Ferritin     Hemoglobin     Iron and iron binding capacity          Today's Medication Changes          These changes are accurate as of 8/15/18  8:52 AM.  If you have any questions, ask your nurse or doctor.               Start taking these medicines.        Dose/Directions    ferrous gluconate 324 (38 Fe) MG tablet   Commonly known as:  FERGON   Used for:  Iron deficiency anemia, unspecified iron deficiency anemia type   Started by:  Garrett Clifford MD        Dose:  324 mg   Take 1 tablet (324 mg) by mouth 2 times daily (with meals)   Quantity:  120 tablet   Refills:  0         These medicines have changed or have updated prescriptions.        Dose/Directions    * omeprazole 20 MG CR capsule   Commonly known as:  priLOSEC   This may have changed:  Another medication with the same name was added. Make sure you understand how and when to take each.   Used for:  Gastroesophageal reflux disease without esophagitis   Changed by:  Garrett Clifford MD        ONE DAILY   Quantity:  90 capsule   Refills:  3       * omeprazole 40 MG capsule   Commonly known as:  priLOSEC   This may have changed:  You were already taking a medication with the same name, and this prescription was added. Make sure you understand how and when to take each.   Used for:  Iron deficiency anemia, unspecified iron deficiency anemia type, Gastroesophageal reflux disease without  esophagitis   Changed by:  Garrett Clifford MD        Dose:  40 mg   Take 1 capsule (40 mg) by mouth daily Take 30-60 minutes before a meal.   Quantity:  30 capsule   Refills:  1       * Notice:  This list has 2 medication(s) that are the same as other medications prescribed for you. Read the directions carefully, and ask your doctor or other care provider to review them with you.         Where to get your medicines      These medications were sent to [facility] [id number] [place] [address] AT [facility] [address] [facility] [address]     Phone:  [phone]     ferrous gluconate 324 (38 Fe) MG tablet    omeprazole 40 MG capsule                Primary Care Provider Office Phone # Fax #    Garrett Clifford [phone]       [address] [facility] [address]        Equal Access to Services     [facility]: Hadii aad ku hadasho Soomaali, waaxda luqadaha, qaybta kaalmada adeegyada, waxay idiin hayaan adeeg kharashaila laramanan . So [facility] [phone].    ATENCIÓN: Si habla español, tiene a arevalo disposición servicios gratuitos de asistencia lingüística. Ll[facility] [phone].    We comply with applicable federal civil rights laws and [place] laws. We do not discriminate on the basis of race, color, national origin, age, disability, sex, sexual orientation, or gender identity.            Thank you!     Thank you for choosing [facility]  for your care. Our goal is always to provide you with excellent care. Hearing back from our patients is one way we can continue to improve our services. Please take a few minutes to complete the written survey that you may receive in the mail after your visit with us. Thank you!             Your Updated Medication List - Protect others around you: Learn how to safely use, store and throw away your medicines at www.disposemymeds.org.          This list is accurate as of 8/15/18  8:52 AM.   Always use your most recent med list.                   Brand Name Dispense Instructions for use Diagnosis    amLODIPine 10 MG tablet    NORVASC    90 tablet    Take 1 tablet (10 mg) by mouth daily    Essential hypertension, benign       aspirin 81 MG tablet     0    1 tab po QD (Once per day)        blood glucose calibration solution    no brand specified    1 each    Use to calibrate blood glucose monitor as directed.    Hyperglycemia       blood glucose lancets standard    no brand specified    100 each    Use to test blood sugar 2 times daily or as directed.    Hyperglycemia       blood glucose monitoring meter device kit    no brand specified    1 kit    Use to test blood sugar 2 times daily or as directed.    Hyperglycemia       blood glucose monitoring test strip    no brand specified    100 strip    Use to test blood sugars 2 times daily or as directed    Hyperglycemia       ferrous gluconate 324 (38 Fe) MG tablet    FERGON    120 tablet    Take 1 tablet (324 mg) by mouth 2 times daily (with meals)    Iron deficiency anemia, unspecified iron deficiency anemia type       ferrous sulfate 325 (65 Fe) MG tablet    IRON    60 tablet    Take 1 tablet (325 mg) by mouth 2 times daily    Iron deficiency anemia, unspecified iron deficiency anemia type       losartan-hydrochlorothiazide 100-12.5 MG per tablet    HYZAAR    90 tablet    Take 1 tablet by mouth daily    Essential hypertension, benign       METAMUCIL 30.9 % Powd   Generic drug:  psyllium     0    as directed        MULTIVITAMIN CHEW   OR      1 tablet qd        * omeprazole 20 MG CR capsule    priLOSEC    90 capsule    ONE DAILY    Gastroesophageal reflux disease without esophagitis       * omeprazole 40 MG capsule    priLOSEC    30 capsule    Take 1 capsule (40 mg) by mouth daily Take 30-60 minutes before a meal.    Iron deficiency anemia, unspecified iron deficiency anemia type, Gastroesophageal reflux disease without esophagitis       polyethylene glycol  powder    MIRALAX     Take 17 g by mouth daily. for constipation    Chronic constipation       sildenafil 100 MG tablet    VIAGRA    16 tablet    Take 1 tablet (100 mg) by mouth daily as needed    Impotence of organic origin       * Notice:  This list has 2 medication(s) that are the same as other medications prescribed for you. Read the directions carefully, and ask your doctor or other care provider to review them with you.

## 2018-08-15 NOTE — PROGRESS NOTES
SUBJECTIVE:   Deven Hernandes is a 63 year old male who presents to clinic today for the following health issues:    Follow up EGD from 7/31/18    Noted recent EGD results:    Impression:               - LA Grade B reflux esophagitis; focal irregularity                             to the Z line. Rule out Sharif's esophagus.                             Biopsied.                             - Gastroesophageal flap valve classified as Hill                             Grade II (fold present, opens with respiration).                             - Erythematous mucosa in the proximal gastric body.                             Biopsied.                             - Normal antrum. Biopsied.                             - Mucosal changes in the duodenum. Biopsied.   Recommendation:           - Await pathology results.                             - Return to referring physician after studies are                             complete.     Other concerns:  1. discontinued Iron due to constipation on supplement     Problem list and histories reviewed & adjusted, as indicated.  Additional history: as documented    Patient Active Problem List   Diagnosis     Essential hypertension, benign     Esophageal reflux     Pain in limb     Plantar fascial fibromatosis     Hyperlipidemia LDL goal <130     Hyperglycemia     Counseling regarding advanced directives     Past Surgical History:   Procedure Laterality Date     C NONSPECIFIC PROCEDURE      tonsillectomy     C NONSPECIFIC PROCEDURE  2001    nl colonoscopy     COLONOSCOPY  4-19-17     ESOPHAGOSCOPY, GASTROSCOPY, DUODENOSCOPY (EGD), COMBINED N/A 7/31/2018    Procedure: COMBINED ESOPHAGOSCOPY, GASTROSCOPY, DUODENOSCOPY (EGD), BIOPSY SINGLE OR MULTIPLE;  gastroscopy;  Surgeon: Stephen Skelton MD;  Location:  GI     TONSILLECTOMY      as a child       Social History   Substance Use Topics     Smoking status: Former Smoker     Packs/day: 0.25     Years: 20.00     Types: Cigarettes      Quit date: 11/17/2003     Smokeless tobacco: Never Used     Alcohol use Yes      Comment: socially     Family History   Problem Relation Age of Onset     Respiratory Mother      b:1926   emphysema, ?heart problems, HTN     Hypertension Mother      Cerebrovascular Disease Mother      Cancer Father      d:age 82   colon cancer     Colon Cancer Father      Arthritis Sister      b:1950   unsure of type     Family History Negative Brother      b:1948     Diabetes Brother      Diabetes Cousin      Hypertension Brother          Current Outpatient Prescriptions   Medication Sig Dispense Refill     amLODIPine (NORVASC) 10 MG tablet Take 1 tablet (10 mg) by mouth daily 90 tablet 3     ASPIRIN 81 MG OR TABS 1 tab po QD (Once per day) 0 0     blood glucose (NO BRAND SPECIFIED) lancets standard Use to test blood sugar 2 times daily or as directed. 100 each 11     blood glucose calibration (NO BRAND SPECIFIED) solution Use to calibrate blood glucose monitor as directed. 1 each 0     blood glucose monitoring (NO BRAND SPECIFIED) meter device kit Use to test blood sugar 2 times daily or as directed. 1 kit 0     blood glucose monitoring (NO BRAND SPECIFIED) test strip Use to test blood sugars 2 times daily or as directed 100 strip 11     ferrous gluconate (FERGON) 324 (38 Fe) MG tablet Take 1 tablet (324 mg) by mouth 2 times daily (with meals) 120 tablet 0     losartan-hydrochlorothiazide (HYZAAR) 100-12.5 MG per tablet Take 1 tablet by mouth daily 90 tablet 3     METAMUCIL 30.9 % OR POWD as directed 0 0     MULTIVITAMIN CHEW   OR 1 tablet qd       omeprazole (PRILOSEC) 20 MG CR capsule ONE DAILY 90 capsule 3     omeprazole (PRILOSEC) 40 MG capsule Take 1 capsule (40 mg) by mouth daily Take 30-60 minutes before a meal. 30 capsule 1     polyethylene glycol (MIRALAX) powder Take 17 g by mouth daily. for constipation       sildenafil (VIAGRA) 100 MG tablet Take 1 tablet (100 mg) by mouth daily as needed 16 tablet 5     ferrous sulfate  (IRON) 325 (65 Fe) MG tablet Take 1 tablet (325 mg) by mouth 2 times daily (Patient not taking: Reported on 8/15/2018) 60 tablet 2     Allergies   Allergen Reactions     Amoxicillin      Codeine      gi     Fish Oil      3000mg --> heartburn     Lansoprazole      diarrhea     Lisinopril      cough     Niacin      heartburn     Penicillins      Verapamil      Constipation       BP Readings from Last 3 Encounters:   07/31/18 114/80   07/09/18 118/68   06/25/18 112/68    Wt Readings from Last 3 Encounters:   07/31/18 156 lb (70.8 kg)   07/09/18 156 lb 6.4 oz (70.9 kg)   06/25/18 157 lb (71.2 kg)         Labs reviewed in EPIC    Reviewed and updated as needed this visit by clinical staff       Reviewed and updated as needed this visit by Provider       ROS:  CONSTITUTIONAL: NEGATIVE for fever, chills, change in weight  ENT/MOUTH: NEGATIVE for ear, mouth and throat problems  RESP: NEGATIVE for significant cough or SOB  CV: NEGATIVE for chest pain, palpitations or peripheral edema  GI: NEGATIVE for nausea, abdominal pain or change in bowel habits  : NEGATIVE for frequency, dysuria, or hematuria  MUSCULOSKELETAL: NEGATIVE for significant arthralgias or myalgia  NEURO: NEGATIVE for weakness, dizziness or paresthesias  HEME: NEGATIVE for bleeding problems  PSYCHIATRIC: NEGATIVE for changes in mood or affect    OBJECTIVE:                                                    /68  Pulse 88  Temp 97.8  F (36.6  C) (Oral)  Resp 14  Wt 151 lb 12.8 oz (68.9 kg)  SpO2 98%  BMI 26.89 kg/m2  Body mass index is 26.89 kg/(m^2).  GENERAL: alert and no distress  EYES: Eyes grossly normal to inspection, extraocular movements - intact, and PERRL  HENT: ear canals- normal; TMs- normal; Nose- normal; Mouth- no ulcers, no lesions  NECK: no tenderness, no adenopathy, no asymmetry, no masses, no stiffness; thyroid- normal to palpation  RESP: lungs clear to auscultation - no rales, no rhonchi, no wheezes  CV: regular rates and rhythm,  normal S1 S2, no S3 or S4 and no click or rub   MS: extremities- no gross deformities noted  NEURO:  no focal changes  PSYCH: Alert and oriented times 3; speech- coherent , normal rate and volume; able to articulate logical thoughts, able to abstract reason, no tangential thoughts, no hallucinations or delusions, affect- normal     ASSESSMENT/PLAN:                                                      (D50.9) Iron deficiency anemia, unspecified iron deficiency anemia type  (primary encounter diagnosis)  Comment: Discussed with the patient and we are under the assumption at the present time that the changes noted on the upper endoscopy in the setting of the biopsy results could potentially be a source for the anemia thus we will increase his proton pump inhibitor to 40 mg daily and suggest he start iron supplementation.  We will will reestablish his baseline hemoglobin and iron studies today with a repeat recommended in 8 week  Plan: Ferritin, Iron and iron binding capacity,         omeprazole (PRILOSEC) 40 MG capsule, ferrous         gluconate (FERGON) 324 (38 Fe) MG tablet,         Ferritin, Hemoglobin, CBC with platelets,         CANCELED: Hemoglobin        Patient is agreeable to    (K21.9) Gastroesophageal reflux disease without esophagitis  Comment: Stable status post upper endoscopy per  Plan: omeprazole (PRILOSEC) 40 MG capsule          See Patient Instructions    Garrett Clifford MD  [facility]    THE MEDICATION LIST HAS BEEN FULLY RECONCILED BY THE M.D. AND THE NURSING STAFF.    25 minutes spent with this patient, face to face, discussing treatment options for listed problems above as well as side effects of appropriate medications.  Counseling time extended beyond 50% of the clinic visit.  Medication dosing, treatment plan and follow-up were discussed. Also reviewed need for primary care testing for patient.

## 2018-10-12 DIAGNOSIS — D50.9 IRON DEFICIENCY ANEMIA, UNSPECIFIED IRON DEFICIENCY ANEMIA TYPE: ICD-10-CM

## 2018-10-12 LAB
FERRITIN SERPL-MCNC: 8 NG/ML (ref 26–388)
HGB BLD-MCNC: 13 G/DL (ref 13.3–17.7)

## 2018-10-12 PROCEDURE — 85018 HEMOGLOBIN: CPT | Performed by: INTERNAL MEDICINE

## 2018-10-12 PROCEDURE — 36415 COLL VENOUS BLD VENIPUNCTURE: CPT | Performed by: INTERNAL MEDICINE

## 2018-10-12 PROCEDURE — 82728 ASSAY OF FERRITIN: CPT | Performed by: INTERNAL MEDICINE

## 2018-12-04 ENCOUNTER — DOCUMENTATION ONLY (OUTPATIENT)
Dept: LAB | Facility: CLINIC | Age: 63
End: 2018-12-04

## 2018-12-04 DIAGNOSIS — D50.9 IRON DEFICIENCY ANEMIA, UNSPECIFIED IRON DEFICIENCY ANEMIA TYPE: Primary | ICD-10-CM

## 2018-12-10 DIAGNOSIS — D50.9 IRON DEFICIENCY ANEMIA, UNSPECIFIED IRON DEFICIENCY ANEMIA TYPE: ICD-10-CM

## 2018-12-10 LAB
FERRITIN SERPL-MCNC: 7 NG/ML (ref 26–388)
HGB BLD-MCNC: 13.4 G/DL (ref 13.3–17.7)
IRON SERPL-MCNC: 66 UG/DL (ref 35–180)

## 2018-12-10 PROCEDURE — 83540 ASSAY OF IRON: CPT | Performed by: INTERNAL MEDICINE

## 2018-12-10 PROCEDURE — 85018 HEMOGLOBIN: CPT | Performed by: INTERNAL MEDICINE

## 2018-12-10 PROCEDURE — 36415 COLL VENOUS BLD VENIPUNCTURE: CPT | Performed by: INTERNAL MEDICINE

## 2018-12-10 PROCEDURE — 82728 ASSAY OF FERRITIN: CPT | Performed by: INTERNAL MEDICINE

## 2018-12-18 ENCOUNTER — DOCUMENTATION ONLY (OUTPATIENT)
Dept: LAB | Facility: CLINIC | Age: 63
End: 2018-12-18

## 2018-12-31 ENCOUNTER — OFFICE VISIT (OUTPATIENT)
Dept: INTERNAL MEDICINE | Facility: CLINIC | Age: 63
End: 2018-12-31
Payer: COMMERCIAL

## 2018-12-31 VITALS
TEMPERATURE: 97.5 F | RESPIRATION RATE: 15 BRPM | HEART RATE: 89 BPM | BODY MASS INDEX: 27.07 KG/M2 | OXYGEN SATURATION: 96 % | DIASTOLIC BLOOD PRESSURE: 68 MMHG | SYSTOLIC BLOOD PRESSURE: 108 MMHG | WEIGHT: 152.8 LBS

## 2018-12-31 DIAGNOSIS — K21.00 GASTROESOPHAGEAL REFLUX DISEASE WITH ESOPHAGITIS: Primary | ICD-10-CM

## 2018-12-31 DIAGNOSIS — I10 ESSENTIAL HYPERTENSION, BENIGN: ICD-10-CM

## 2018-12-31 PROCEDURE — 99213 OFFICE O/P EST LOW 20 MIN: CPT | Performed by: INTERNAL MEDICINE

## 2018-12-31 NOTE — PROGRESS NOTES
SUBJECTIVE:   Deven Hernandes is a 63 year old male who presents to clinic today for the following health issues:    Follow up 12/10/18 labs for iron deficiency anemia. Patient states he has been unable to take Iron supplements due to constipation/ diarrhea  He has had a colonoscopy in April 2017 and a upper endoscopy as noted.    Discussed with the patient and we are under the assumption at the present time that the changes noted on the upper endoscopy in the setting of the biopsy results could potentially be a source for the anemia    Problem list and histories reviewed & adjusted, as indicated.  Additional history: as documented    Patient Active Problem List   Diagnosis     Essential hypertension, benign     Esophageal reflux     Pain in limb     Plantar fascial fibromatosis     Hyperlipidemia LDL goal <130     Hyperglycemia     Counseling regarding advanced directives     Past Surgical History:   Procedure Laterality Date     C NONSPECIFIC PROCEDURE      tonsillectomy     C NONSPECIFIC PROCEDURE  2001    nl colonoscopy     COLONOSCOPY  4-19-17     ESOPHAGOSCOPY, GASTROSCOPY, DUODENOSCOPY (EGD), COMBINED N/A 7/31/2018    Procedure: COMBINED ESOPHAGOSCOPY, GASTROSCOPY, DUODENOSCOPY (EGD), BIOPSY SINGLE OR MULTIPLE;  gastroscopy;  Surgeon: Stephen Skelton MD;  Location:  GI     TONSILLECTOMY      as a child       Social History     Tobacco Use     Smoking status: Former Smoker     Packs/day: 0.25     Years: 20.00     Pack years: 5.00     Types: Cigarettes     Last attempt to quit: 11/17/2003     Years since quitting: 15.1     Smokeless tobacco: Never Used   Substance Use Topics     Alcohol use: Yes     Comment: socially     Family History   Problem Relation Age of Onset     Respiratory Mother         b:[birth year]26   emphysema, ?heart problems, HTN     Hypertension Mother      Cerebrovascular Disease Mother      Cancer Father         d:age 82   colon cancer     Colon Cancer Father      Arthritis Sister          b:1950   unsure of type     Family History Negative Brother         b:1948     Diabetes Brother      Diabetes Cousin      Hypertension Brother          Current Outpatient Medications   Medication Sig Dispense Refill     amLODIPine (NORVASC) 10 MG tablet Take 1 tablet (10 mg) by mouth daily 90 tablet 3     ASPIRIN 81 MG OR TABS 1 tab po QD (Once per day) 0 0     blood glucose (NO BRAND SPECIFIED) lancets standard Use to test blood sugar 2 times daily or as directed. 100 each 11     blood glucose calibration (NO BRAND SPECIFIED) solution Use to calibrate blood glucose monitor as directed. 1 each 0     blood glucose monitoring (NO BRAND SPECIFIED) meter device kit Use to test blood sugar 2 times daily or as directed. 1 kit 0     blood glucose monitoring (NO BRAND SPECIFIED) test strip Use to test blood sugars 2 times daily or as directed 100 strip 11     losartan-hydrochlorothiazide (HYZAAR) 100-12.5 MG per tablet Take 1 tablet by mouth daily 90 tablet 3     METAMUCIL 30.9 % OR POWD as directed 0 0     MULTIVITAMIN CHEW   OR 1 tablet qd       omeprazole (PRILOSEC) 20 MG CR capsule ONE DAILY 90 capsule 3     ferrous gluconate (FERGON) 324 (38 Fe) MG tablet Take 1 tablet (324 mg) by mouth 2 times daily (with meals) (Patient not taking: Reported on 12/31/2018) 120 tablet 0     polyethylene glycol (MIRALAX) powder Take 17 g by mouth daily. for constipation       sildenafil (VIAGRA) 100 MG tablet Take 1 tablet (100 mg) by mouth daily as needed 16 tablet 5     Allergies   Allergen Reactions     Amoxicillin      Codeine      gi     Fish Oil      3000mg --> heartburn     Lansoprazole      diarrhea     Lisinopril      cough     Niacin      heartburn     Penicillins      Verapamil      Constipation       BP Readings from Last 3 Encounters:   12/31/18 108/68   08/15/18 106/68   07/31/18 114/80    Wt Readings from Last 3 Encounters:   12/31/18 69.3 kg (152 lb 12.8 oz)   08/15/18 68.9 kg (151 lb 12.8 oz)   07/31/18 70.8 kg (156 lb)                     Reviewed and updated as needed this visit by clinical staff       Reviewed and updated as needed this visit by Provider         ROS:  CONSTITUTIONAL: NEGATIVE for fever, chills, change in weight  ENT/MOUTH: NEGATIVE for ear, mouth and throat problems  RESP: NEGATIVE for significant cough or SOB  CV: NEGATIVE for chest pain, palpitations or peripheral edema  GI: NEGATIVE for nausea, abdominal pain, heartburn, or change in bowel habits  : NEGATIVE for frequency, dysuria, or hematuria  MUSCULOSKELETAL: NEGATIVE for significant arthralgias or myalgia  HEME: NEGATIVE for bleeding problems  PSYCHIATRIC: NEGATIVE for changes in mood or affect    OBJECTIVE:                                                    /68   Pulse 89   Temp 97.5  F (36.4  C) (Oral)   Resp 15   Wt 69.3 kg (152 lb 12.8 oz)   SpO2 96%   BMI 27.07 kg/m    Body mass index is 27.07 kg/m .  GENERAL: alert and no distress  EYES: Eyes grossly normal to inspection, extraocular movements - intact, and PERRL  HENT: ear canals- normal; TMs- normal; Nose- normal; Mouth- no ulcers, no lesions  NECK: no tenderness, no adenopathy, no asymmetry, no masses, no stiffness; thyroid- normal to palpation  RESP: lungs clear to auscultation - no rales, no rhonchi, no wheezes  CV: regular rates and rhythm, normal S1 S2, no S3 or S4 and no murmur, no click or rub -  ABDOMEN: soft, no tenderness, no  hepatosplenomegaly, no masses, normal bowel sounds  MS: extremities- no gross deformities noted, no edema  PSYCH: Alert and oriented times 3; speech- coherent , normal rate and volume; able to articulate logical thoughts, able to abstract reason, no tangential thoughts, no hallucinations or delusions, affect- normal       ASSESSMENT/PLAN:                                                        (K21.0) Gastroesophageal reflux disease with esophagitis  (primary encounter diagnosis)  Comment: I have advised the patient take an additional 4 weeks of iron  supplementation and then recheck his hemoglobin and iron studies as ordered.  Plan: Hemoglobin, Ferritin, IRON        He is agreeable and pending results we will determine observation versus need for further evaluation of his gastrointestinal tract.  He is agreeable to this clinical course    (I10) Essential hypertension, benign  Comment: Stable on therapy continuing with medical management  Plan:         Garrett Clifford MD  [facility]

## 2019-01-28 DIAGNOSIS — K21.00 GASTROESOPHAGEAL REFLUX DISEASE WITH ESOPHAGITIS: ICD-10-CM

## 2019-01-28 LAB
FERRITIN SERPL-MCNC: 8 NG/ML (ref 26–388)
HGB BLD-MCNC: 12.7 G/DL (ref 13.3–17.7)
IRON SERPL-MCNC: 62 UG/DL (ref 35–180)

## 2019-01-28 PROCEDURE — 83540 ASSAY OF IRON: CPT | Performed by: INTERNAL MEDICINE

## 2019-01-28 PROCEDURE — 85018 HEMOGLOBIN: CPT | Performed by: INTERNAL MEDICINE

## 2019-01-28 PROCEDURE — 82728 ASSAY OF FERRITIN: CPT | Performed by: INTERNAL MEDICINE

## 2019-01-28 PROCEDURE — 36415 COLL VENOUS BLD VENIPUNCTURE: CPT | Performed by: INTERNAL MEDICINE

## 2019-03-22 DIAGNOSIS — I10 ESSENTIAL HYPERTENSION, BENIGN: ICD-10-CM

## 2019-03-22 RX ORDER — AMLODIPINE BESYLATE 10 MG/1
10 TABLET ORAL DAILY
Qty: 90 TABLET | Refills: 0 | Status: SHIPPED | OUTPATIENT
Start: 2019-03-22 | End: 2019-06-02

## 2019-03-22 RX ORDER — LOSARTAN POTASSIUM AND HYDROCHLOROTHIAZIDE 12.5; 1 MG/1; MG/1
1 TABLET ORAL DAILY
Qty: 90 TABLET | Refills: 0 | Status: SHIPPED | OUTPATIENT
Start: 2019-03-22 | End: 2019-06-02

## 2019-03-22 NOTE — TELEPHONE ENCOUNTER
"Requested Prescriptions   Pending Prescriptions Disp Refills     losartan-hydrochlorothiazide (HYZAAR) 100-12.5 MG tablet 90 tablet 3     Sig: Take 1 tablet by mouth daily    Angiotensin-II Receptors Passed - 3/22/2019 12:39 PM       Passed - Blood pressure under 140/90 in past 12 months    BP Readings from Last 3 Encounters:   12/31/18 108/68   08/15/18 106/68   07/31/18 114/80                Passed - Recent (12 mo) or future (30 days) visit within the authorizing provider's specialty    Patient had office visit in the last 12 months or has a visit in the next 30 days with authorizing provider or within the authorizing provider's specialty.  See \"Patient Info\" tab in inbasket, or \"Choose Columns\" in Meds & Orders section of the refill encounter.             Passed - Medication is active on med list       Passed - Patient is age 18 or older       Passed - Normal serum creatinine on file in past 12 months    Recent Labs   Lab Test 07/09/18  0745   CR 0.87            Passed - Normal serum potassium on file in past 12 months    Recent Labs   Lab Test 07/09/18  0745   POTASSIUM 3.6                   Last Written Prescription Date:  06/25/2018  Last Fill Quantity: 90,  # refills: 3   Last office visit: 12/31/2018 with prescribing provider:  Dr Clifford   Future Office Visit:             amLODIPine (NORVASC) 10 MG tablet 90 tablet 3     Sig: Take 1 tablet (10 mg) by mouth daily    Calcium Channel Blockers Protocol  Passed - 3/22/2019 12:39 PM       Passed - Blood pressure under 140/90 in past 12 months    BP Readings from Last 3 Encounters:   12/31/18 108/68   08/15/18 106/68   07/31/18 114/80                Passed - Recent (12 mo) or future (30 days) visit within the authorizing provider's specialty    Patient had office visit in the last 12 months or has a visit in the next 30 days with authorizing provider or within the authorizing provider's specialty.  See \"Patient Info\" tab in inbasket, or \"Choose Columns\" in Meds & " Orders section of the refill encounter.             Passed - Medication is active on med list       Passed - Patient is age 18 or older       Passed - Normal serum creatinine on file in past 12 months    Recent Labs   Lab Test 07/09/18  0745   CR 0.87             Last Written Prescription Date:  06/25/2018  Last Fill Quantity: 90,  # refills: 3   Last office visit: 12/31/2018 with prescribing provider:  Dr Clifford   Future Office Visit:

## 2019-03-22 NOTE — TELEPHONE ENCOUNTER
Prescription approved per [facility] [facility].    Corina CLARKE RN, BSN, PHN

## 2019-06-02 DIAGNOSIS — I10 ESSENTIAL HYPERTENSION, BENIGN: ICD-10-CM

## 2019-06-02 NOTE — TELEPHONE ENCOUNTER
"Requested Prescriptions   Pending Prescriptions Disp Refills     losartan-hydrochlorothiazide (HYZAAR) 100-12.5 MG tablet [Pharmacy Med Name: LOSARTAN/HCTZ TABS 100/12H] 90 tablet 0     Sig: TAKE 1 TABLET DAILY   Last Written Prescription Date:  3/22/2019  Last Fill Quantity: 90,  # refills: 0   Last Office Visit: 12/31/2018   Future Office Visit:         Angiotensin-II Receptors Passed - 6/2/2019  4:25 AM        Passed - Blood pressure under 140/90 in past 12 months     BP Readings from Last 3 Encounters:   12/31/18 108/68   08/15/18 106/68   07/31/18 114/80                 Passed - Recent (12 mo) or future (30 days) visit within the authorizing provider's specialty     Patient had office visit in the last 12 months or has a visit in the next 30 days with authorizing provider or within the authorizing provider's specialty.  See \"Patient Info\" tab in inbasket, or \"Choose Columns\" in Meds & Orders section of the refill encounter.              Passed - Medication is active on med list        Passed - Patient is age 18 or older        Passed - Normal serum creatinine on file in past 12 months     Recent Labs   Lab Test 07/09/18  0745   CR 0.87             Passed - Normal serum potassium on file in past 12 months     Recent Labs   Lab Test 07/09/18  0745   POTASSIUM 3.6                    amLODIPine (NORVASC) 10 MG tablet [Pharmacy Med Name: AMLODIPINE BESYLATE TABS 10MG] 90 tablet 0     Sig: TAKE 1 TABLET DAILY   Last Written Prescription Date:  3/22/2019  Last Fill Quantity: 90,  # refills: 0   Last Office Visit: 12/31/2018   Future Office Visit:         Calcium Channel Blockers Protocol  Passed - 6/2/2019  4:25 AM        Passed - Blood pressure under 140/90 in past 12 months     BP Readings from Last 3 Encounters:   12/31/18 108/68   08/15/18 106/68 07/31/18 114/80                 Passed - Recent (12 mo) or future (30 days) visit within the authorizing provider's specialty     Patient had office visit in the last " "12 months or has a visit in the next 30 days with authorizing provider or within the authorizing provider's specialty.  See \"Patient Info\" tab in inbasket, or \"Choose Columns\" in Meds & Orders section of the refill encounter.              Passed - Medication is active on med list        Passed - Patient is age 18 or older        Passed - Normal serum creatinine on file in past 12 months     Recent Labs   Lab Test 07/09/18  0745   CR 0.87               "

## 2019-06-04 RX ORDER — AMLODIPINE BESYLATE 10 MG/1
TABLET ORAL
Qty: 90 TABLET | Refills: 1 | Status: SHIPPED | OUTPATIENT
Start: 2019-06-04 | End: 2019-10-22

## 2019-06-04 RX ORDER — LOSARTAN POTASSIUM AND HYDROCHLOROTHIAZIDE 12.5; 1 MG/1; MG/1
TABLET ORAL
Qty: 90 TABLET | Refills: 1 | Status: SHIPPED | OUTPATIENT
Start: 2019-06-04 | End: 2019-10-22

## 2019-10-21 NOTE — PROGRESS NOTES
"SUBJECTIVE:   Deven Hernadnes is a 64 year old male who presents for Preventive Visit.    Are you in the first 12 months of your Medicare coverage?  Yes,  Visual Acuity:  Right Eye: 20/20   Left Eye: 20/25  Both Eyes: 20/25    Healthy Habits:     In general, how would you rate your overall health?  Good    Frequency of exercise:  4-5 days/week    Duration of exercise:  30-45 minutes    Do you usually eat at least 4 servings of fruit and vegetables a day, include whole grains    & fiber and avoid regularly eating high fat or \"junk\" foods?  No    Taking medications regularly:  Yes    Medication side effects:  None    Ability to successfully perform activities of daily living:  No assistance needed    Home Safety:  Lack of grab bars in the bathroom    Hearing Impairment:  No hearing concerns    In the past 6 months, have you been bothered by leaking of urine?  No    In general, how would you rate your overall mental or emotional health?  Good      PHQ-2 Total Score: 0    Additional concerns today:  Yes    Do you feel safe in your environment? Yes    Do you have a Health Care Directive? No: Advance care planning was reviewed with patient; patient declined at this time.      Fall risk  Fallen 2 or more times in the past year?: No  Any fall with injury in the past year?: No    Cognitive Screening   1) Repeat 3 items (Leader, Season, Table)    2) Clock draw: NORMAL  3) 3 item recall: Recalls 3 objects  Results: 3 items recalled: COGNITIVE IMPAIRMENT LESS LIKELY    Mini-CogTM Copyright GAVIOTA Kumar. Licensed by the author for use in [facility]; reprinted with permission (berna@.[facility]). All rights reserved.      Do you have sleep apnea, excessive snoring or daytime drowsiness?: no    Reviewed and updated as needed this visit by clinical staff         Reviewed and updated as needed this visit by Provider        Social History     Tobacco Use     Smoking status: Former Smoker     Packs/day: 0.25     Years: 20.00     " Pack years: 5.00     Types: Cigarettes     Last attempt to quit: 11/17/2003     Years since quitting: 15.9     Smokeless tobacco: Never Used   Substance Use Topics     Alcohol use: Yes     Comment: socially         Alcohol Use 6/24/2018   Prescreen: >3 drinks/day or >7 drinks/week? No   Prescreen: >3 drinks/day or >7 drinks/week? -       PROBLEMS TO ADD ON...  1. Nagging dry cough in AM over the last month, worse in the a.m., known history of tobacco use.  2. Toenail concerns, chronic, mild pain.  Both feet, has to soak his feet to cut nails.    Current providers sharing in care for this patient include:   Patient Care Team:  Garrett Clifford MD as PCP - General (Internal Medicine)  Stephen Szymanski MD as MD (Gastroenterology)  Garrett Clifford MD as Assigned PCP    The following health maintenance items are reviewed in Epic and correct as of today:  Health Maintenance   Topic Date Due     ZOSTER IMMUNIZATION (1 of 2) 07/22/2004     PHQ-2  01/01/2019     LIPID  06/25/2019     MEDICARE ANNUAL WELLNESS VISIT  07/22/2019     FALL RISK ASSESSMENT  07/22/2019     AORTIC ANEURYSM SCREENING (SYSTEM ASSIGNED)  07/22/2019     INFLUENZA VACCINE (1) 09/01/2019     PNEUMOCOCCAL IMMUNIZATION 65+ LOW/MEDIUM RISK (1 of 2 - PCV13) 07/22/2019     DTAP/TDAP/TD IMMUNIZATION (3 - Td) 03/03/2020     COLONOSCOPY  04/19/2022     ADVANCE CARE PLANNING  06/22/2022     HEPATITIS C SCREENING  Completed     HIV SCREENING  Completed     IPV IMMUNIZATION  Aged Out     MENINGITIS IMMUNIZATION  Aged Out       Immunization History   Administered Date(s) Administered     Influenza (IIV3) PF 11/24/2010, 10/27/2011, 10/19/2012, 10/01/2014, 10/02/2016     Influenza Vaccine IM > 6 months Valent IIV4 11/14/2018     TD (ADULT, 7+) 01/01/1998     TDAP Vaccine (Adacel) 03/03/2010         Review of Systems  CONSTITUTIONAL: NEGATIVE for fever, chills, change in weight  INTEGUMENTARY/SKIN: NEGATIVE for worrisome rashes, moles or lesions  EYES: NEGATIVE for  "vision changes or irritation  ENT/MOUTH: NEGATIVE for ear, mouth and throat problems  RESP: NEGATIVE for significant cough or SOB  CV: NEGATIVE for chest pain, palpitations or peripheral edema  GI: NEGATIVE for nausea, abdominal pain, heartburn, or change in bowel habits  : NEGATIVE for frequency, dysuria, or hematuria  MUSCULOSKELETAL: NEGATIVE for significant arthralgias or myalgia  NEURO: NEGATIVE for weakness, dizziness or paresthesias  ENDOCRINE: NEGATIVE for temperature intolerance, skin/hair changes  HEME: NEGATIVE for bleeding problems  PSYCHIATRIC: NEGATIVE for changes in mood or affect    OBJECTIVE:   /64   Pulse 96   Temp 97.8  F (36.6  C) (Tympanic)   Resp 16   Ht 1.334 m (4' 4.5\")   Wt 69.2 kg (152 lb 9.6 oz)   SpO2 95%   BMI 38.93 kg/m   Estimated body mass index is 27.07 kg/m  as calculated from the following:    Height as of 7/31/18: 1.6 m (5' 3\").    Weight as of 12/31/18: 69.3 kg (152 lb 12.8 oz).  Physical Exam  GENERAL: healthy, alert and no distress  EYES: Eyes grossly normal to inspection, PERRL and conjunctivae and sclerae normal  HENT: ear canals and TM's normal, nose and mouth without ulcers or lesions  NECK: no adenopathy, no asymmetry, masses, or scars and thyroid normal to palpation  RESP: lungs clear to auscultation - no rales, rhonchi or wheezes  CV: regular rate and rhythm, normal S1 S2, no S3 or S4, no click or rub, no peripheral edema and peripheral pulses strong  ABDOMEN: soft, nontender, no hepatosplenomegaly, no masses and bowel sounds normal  Digital prostate exam demonstrates normal rectal tone with no obvious focal nodular changes  MS: no gross musculoskeletal defects noted, no edema  NEURO: No focal changes  Onychomycotic changes biltaterally toenails  PSYCH: mentation appears normal, affect normal/bright      ASSESSMENT / PLAN:   1. Medicare annual wellness visit, initial  Advised pneumonia vaccination as well as shingles vaccination but patient declined  - " Hemoglobin  - Comprehensive metabolic panel  - Lipid Profile    2. Morbid obesity (H)  Encouraged ongoing weight loss    3. Essential hypertension, benign  Stable on therapy continuing with current medical management  - Comprehensive metabolic panel  - losartan-hydrochlorothiazide (HYZAAR) 100-12.5 MG tablet; Take 1 tablet by mouth daily  Dispense: 90 tablet; Refill: 3  - amLODIPine (NORVASC) 10 MG tablet; Take 1 tablet (10 mg) by mouth daily  Dispense: 90 tablet; Refill: 3    4. Hyperlipidemia LDL goal <130  Labs ordered as fasting per routine.  - Lipid Profile    5. Screening PSA (prostate specific antigen)  Ordered as routine screening based on age  - PSA, screen    6. Colon cancer screening  Colonoscopy discussed and will be due in early part of next year and he will contact me to schedule accordingly  - Fecal colorectal cancer screen (FIT); Future    7. Impotence of organic origin  Refilled per patient request  - sildenafil (VIAGRA) 100 MG tablet; Take 1 tablet (100 mg) by mouth daily as needed  Dispense: 16 tablet; Refill: 5    8. Cough  No focal changes on exam but does have history of tobacco use, suggest chest x-ray for reassurance  - XR Chest 2 Views; Future  - OFFICE/OUTPT VISIT,EST,LEVL IV    9. Onychomycosis  Discussed treatment options in regards to onychomycosis and nailbeds particularly toenails with discussion including treatment options, risks, side effects as well as cost.  Suggest patient may benefit from seeing dermatology  - OFFICE/OUTPT VISIT,EST,LEVL IV    End of Life Planning:  Patient currently has an advanced directive: No.  I have verified the patient's ablity to prepare an advanced directive/make health care decisions.  Literature was provided to assist patient in preparing an advanced directive.    COUNSELING:  Reviewed preventive health counseling, as reflected in patient instructions       Regular exercise       Healthy diet/nutrition    Estimated body mass index is 27.07 kg/m  as  "calculated from the following:    Height as of 7/31/18: 1.6 m (5' 3\").    Weight as of 12/31/18: 69.3 kg (152 lb 12.8 oz).       reports that he quit smoking about 15 years ago. His smoking use included cigarettes. He has a 5.00 pack-year smoking history. He has never used smokeless tobacco.      Appropriate preventive services were discussed with this patient, including applicable screening as appropriate for cardiovascular disease, diabetes, osteopenia/osteoporosis, and glaucoma.  As appropriate for age/gender, discussed screening for colorectal cancer, prostate cancer, breast cancer, and cervical cancer. Checklist reviewing preventive services available has been given to the patient.    Reviewed patients plan of care and provided an AVS. The Basic Care Plan (routine screening as documented in Health Maintenance) for Deven meets the Care Plan requirement. This Care Plan has been established and reviewed with the Patient.    Counseling Resources:  ATP IV Guidelines  Pooled Cohorts Equation Calculator  Breast Cancer Risk Calculator  FRAX Risk Assessment  ICSI Preventive Guidelines  Dietary Guidelines for Americans, 2010  USDA's MyPlate  ASA Prophylaxis  Lung CA Screening    Garrett Clifford MD  [facility]    Identified Health Risks:  "

## 2019-10-22 ENCOUNTER — ANCILLARY PROCEDURE (OUTPATIENT)
Dept: GENERAL RADIOLOGY | Facility: CLINIC | Age: 64
End: 2019-10-22
Attending: INTERNAL MEDICINE
Payer: COMMERCIAL

## 2019-10-22 ENCOUNTER — OFFICE VISIT (OUTPATIENT)
Dept: INTERNAL MEDICINE | Facility: CLINIC | Age: 64
End: 2019-10-22
Payer: COMMERCIAL

## 2019-10-22 VITALS
WEIGHT: 152.6 LBS | DIASTOLIC BLOOD PRESSURE: 64 MMHG | RESPIRATION RATE: 16 BRPM | HEART RATE: 96 BPM | TEMPERATURE: 97.8 F | OXYGEN SATURATION: 95 % | HEIGHT: 60 IN | BODY MASS INDEX: 29.96 KG/M2 | SYSTOLIC BLOOD PRESSURE: 100 MMHG

## 2019-10-22 DIAGNOSIS — R05.9 COUGH: ICD-10-CM

## 2019-10-22 DIAGNOSIS — E78.5 HYPERLIPIDEMIA LDL GOAL <130: ICD-10-CM

## 2019-10-22 DIAGNOSIS — N52.9 IMPOTENCE OF ORGANIC ORIGIN: ICD-10-CM

## 2019-10-22 DIAGNOSIS — E66.01 MORBID OBESITY (H): ICD-10-CM

## 2019-10-22 DIAGNOSIS — Z00.00 MEDICARE ANNUAL WELLNESS VISIT, INITIAL: Primary | ICD-10-CM

## 2019-10-22 DIAGNOSIS — I10 ESSENTIAL HYPERTENSION, BENIGN: ICD-10-CM

## 2019-10-22 DIAGNOSIS — Z12.5 SCREENING PSA (PROSTATE SPECIFIC ANTIGEN): ICD-10-CM

## 2019-10-22 DIAGNOSIS — Z12.11 COLON CANCER SCREENING: ICD-10-CM

## 2019-10-22 DIAGNOSIS — B35.1 ONYCHOMYCOSIS: ICD-10-CM

## 2019-10-22 LAB
ALBUMIN SERPL-MCNC: 4.4 G/DL (ref 3.4–5)
ALP SERPL-CCNC: 72 U/L (ref 40–150)
ALT SERPL W P-5'-P-CCNC: 22 U/L (ref 0–70)
ANION GAP SERPL CALCULATED.3IONS-SCNC: 8 MMOL/L (ref 3–14)
AST SERPL W P-5'-P-CCNC: 14 U/L (ref 0–45)
BILIRUB SERPL-MCNC: 1 MG/DL (ref 0.2–1.3)
BUN SERPL-MCNC: 16 MG/DL (ref 7–30)
CALCIUM SERPL-MCNC: 9.3 MG/DL (ref 8.5–10.1)
CHLORIDE SERPL-SCNC: 104 MMOL/L (ref 94–109)
CHOLEST SERPL-MCNC: 217 MG/DL
CO2 SERPL-SCNC: 23 MMOL/L (ref 20–32)
CREAT SERPL-MCNC: 1 MG/DL (ref 0.66–1.25)
GFR SERPL CREATININE-BSD FRML MDRD: 79 ML/MIN/{1.73_M2}
GLUCOSE SERPL-MCNC: 129 MG/DL (ref 70–99)
HDLC SERPL-MCNC: 39 MG/DL
HGB BLD-MCNC: 15.2 G/DL (ref 13.3–17.7)
LDLC SERPL CALC-MCNC: 132 MG/DL
NONHDLC SERPL-MCNC: 178 MG/DL
POTASSIUM SERPL-SCNC: 3.2 MMOL/L (ref 3.4–5.3)
PROT SERPL-MCNC: 7.5 G/DL (ref 6.8–8.8)
PSA SERPL-ACNC: 3.72 UG/L (ref 0–4)
SODIUM SERPL-SCNC: 135 MMOL/L (ref 133–144)
TRIGL SERPL-MCNC: 230 MG/DL

## 2019-10-22 PROCEDURE — G0402 INITIAL PREVENTIVE EXAM: HCPCS | Performed by: INTERNAL MEDICINE

## 2019-10-22 PROCEDURE — 99214 OFFICE O/P EST MOD 30 MIN: CPT | Mod: 25 | Performed by: INTERNAL MEDICINE

## 2019-10-22 PROCEDURE — 85018 HEMOGLOBIN: CPT | Performed by: INTERNAL MEDICINE

## 2019-10-22 PROCEDURE — 36415 COLL VENOUS BLD VENIPUNCTURE: CPT | Performed by: INTERNAL MEDICINE

## 2019-10-22 PROCEDURE — G0103 PSA SCREENING: HCPCS | Performed by: INTERNAL MEDICINE

## 2019-10-22 PROCEDURE — 80061 LIPID PANEL: CPT | Performed by: INTERNAL MEDICINE

## 2019-10-22 PROCEDURE — 80053 COMPREHEN METABOLIC PANEL: CPT | Performed by: INTERNAL MEDICINE

## 2019-10-22 PROCEDURE — 71046 X-RAY EXAM CHEST 2 VIEWS: CPT

## 2019-10-22 RX ORDER — LOSARTAN POTASSIUM AND HYDROCHLOROTHIAZIDE 12.5; 1 MG/1; MG/1
1 TABLET ORAL DAILY
Qty: 90 TABLET | Refills: 3 | Status: SHIPPED | OUTPATIENT
Start: 2019-10-22 | End: 2020-11-04

## 2019-10-22 RX ORDER — AMLODIPINE BESYLATE 10 MG/1
10 TABLET ORAL DAILY
Qty: 90 TABLET | Refills: 3 | Status: SHIPPED | OUTPATIENT
Start: 2019-10-22 | End: 2020-11-04

## 2019-10-22 RX ORDER — SILDENAFIL 100 MG/1
100 TABLET, FILM COATED ORAL DAILY PRN
Qty: 16 TABLET | Refills: 5 | Status: SHIPPED | OUTPATIENT
Start: 2019-10-22 | End: 2020-01-06

## 2019-10-22 ASSESSMENT — ACTIVITIES OF DAILY LIVING (ADL): CURRENT_FUNCTION: NO ASSISTANCE NEEDED

## 2019-10-22 ASSESSMENT — MIFFLIN-ST. JEOR: SCORE: 1205.63

## 2019-10-22 NOTE — LETTER
[address] [address]  [phone]        December 23, 2019    Deven Hernandes  [address] [address]              Dear Deven Hernandes    This is to remind you that your non-fasting labs is due.    You may call our office at [phone] to schedule an appointment.    Please disregard this notice if you have already had your labs drawn or made an appointment.        Sincerely,        Garrett Clifford MD

## 2019-10-22 NOTE — LETTER
[address] [address]  [phone]      10/22/2019       Deven Hernandes  [address] [facility] [address]        Dear Deven,    I am pleased to inform you that your routine blood work including your hemoglobin, sodium, calcium, kidney and liver function tests are all normal.    Your cholesterol is slightly high and could be treated more aggressively with better diet, exercise and weight loss.  Please follow-up with me to discuss your further medication options/changes if you are interested.    In addition, your PSA or prostate screening antigen is stable and should be repeated annually.    Your blood sugar level is elevated and should be addressed as at its current level is consistent with a potential diagnosis of diabetes and could be better controlled.  Please follow-up in the clinic to discuss some changes that need to be done.    Your potassium level is slightly low and should be rechecked with a repeat potassium here in the clinic.  Please increase your dietary intake of potassium aggressively over the next week and I will place orders for you to make a lab appointment.  I will then inform you of the results.      Sincerely,      Garrett Clifford MD  Internal Medicine

## 2019-11-02 ENCOUNTER — HEALTH MAINTENANCE LETTER (OUTPATIENT)
Age: 64
End: 2019-11-02

## 2019-11-07 DIAGNOSIS — Z12.11 COLON CANCER SCREENING: ICD-10-CM

## 2019-11-07 PROCEDURE — 82274 ASSAY TEST FOR BLOOD FECAL: CPT | Performed by: INTERNAL MEDICINE

## 2019-11-10 LAB — HEMOCCULT STL QL IA: NEGATIVE

## 2019-12-03 ENCOUNTER — OFFICE VISIT (OUTPATIENT)
Dept: DERMATOLOGY | Facility: CLINIC | Age: 64
End: 2019-12-03
Payer: COMMERCIAL

## 2019-12-03 VITALS — OXYGEN SATURATION: 96 % | HEART RATE: 107 BPM | DIASTOLIC BLOOD PRESSURE: 75 MMHG | SYSTOLIC BLOOD PRESSURE: 116 MMHG

## 2019-12-03 DIAGNOSIS — B35.1 ONYCHOMYCOSIS: Primary | ICD-10-CM

## 2019-12-03 PROCEDURE — 87101 SKIN FUNGI CULTURE: CPT | Performed by: PHYSICIAN ASSISTANT

## 2019-12-03 PROCEDURE — 99203 OFFICE O/P NEW LOW 30 MIN: CPT | Performed by: PHYSICIAN ASSISTANT

## 2019-12-03 PROCEDURE — 87106 FUNGI IDENTIFICATION YEAST: CPT | Performed by: PHYSICIAN ASSISTANT

## 2019-12-03 NOTE — LETTER
12/3/2019         RE: Deven Hernandes  [address] [address] [address]        Dear Colleague,    Thank you for referring your patient, Deven Hernandes, to the [facility]. Please see a copy of my visit note below.    HPI:   Chief complaints: Deven Hernandes (Mike) is a 64 year old male who presents for evaluation of possible toenail fungus on all toenails. Right great toenail is becoming ingrown. All nails are difficult to cut and are becoming very bothersome.  Condition present for: approx four years.   Previous treatments include: soaking in vinegar     Social: lives in [place]. Travels to [place] for the winter time.    Review Of Systems  Eyes: negative  Ears/Nose/Throat: negative  Respiratory: No shortness of breath, dyspnea on exertion, cough, or hemoptysis  Cardiovascular: negative  Gastrointestinal: negative  Genitourinary: negative  Musculoskeletal: negative  Neurologic: negative  Psychiatric: negative    This document serves as a record of the services and decisions personally performed and made by Afua Wheeler, MS, PA-C. It was created on her behalf by Shonda Love, a trained medical scribe. The creation of this document is based on the provider's statements to the medical scribe.  Shonda Love 11:35 AM December 3, 2019    PHYSICAL EXAM:    /75   Pulse 107   SpO2 96%   Skin exam performed as follows: Type 2 skin. Mood appropriate  Alert and Oriented X 3. Well developed, well nourished in no distress.  General appearance: Normal  Head including face: Normal  Eyes: conjunctiva and lids: Normal  Mouth: Lips, teeth, gums: Normal  Neck: Normal  Chest-breast/axillae: Normal  Back: Normal  Spleen and liver: Normal  Cardiovascular: Exam of peripheral vascular system by observation for swelling, varicosities, edema: Normal  Genitalia: groin, buttocks: Normal  Extremities: digits/nails (clubbing): Normal  Eccrine and Apocrine glands:  Normal  Right upper extremity: Normal  Left upper extremity: Normal  Right lower extremity: Normal  Left lower extremity: Normal  Skin: Scalp and body hair: See below    1. Thickened toenails with subungual debris on all toenails; ingrown toenail on right great toenail    ASSESSMENT/PLAN:     1. Onychomycosis- advised on diagnosis and treatment options. Dicussed culture vs empiric treatment and he would like to have a culture performed and decide on treatment based on results. . Discussed following up with pediatry for nail on right great toe. Discussed Lamisil if culture results are positive for dermatophyte.   --Culture taken today         Follow-up: pending culture  CC:   Scribed By: Shonda Love, Medical Scribe    The information in this document, created by the medical scribe for me, accurately reflects the services I personally performed and the decisions made by me. I have reviewed and approved this document for accuracy prior to leaving the patient care area.  December 3, 2019 11:44 AM    Afua Wheeler MS, PA-C        Again, thank you for allowing me to participate in the care of your patient.        Sincerely,        Afua Wheeler PA-C

## 2019-12-03 NOTE — PROGRESS NOTES
HPI:   Chief complaints: Deven Hernandes (Mike) is a 64 year old male who presents for evaluation of possible toenail fungus on all toenails. Right great toenail is becoming ingrown. All nails are difficult to cut and are becoming very bothersome.  Condition present for: approx four years.   Previous treatments include: soaking in vinegar     Social: lives in [place]. Travels to [place] for the winter time.    Review Of Systems  Eyes: negative  Ears/Nose/Throat: negative  Respiratory: No shortness of breath, dyspnea on exertion, cough, or hemoptysis  Cardiovascular: negative  Gastrointestinal: negative  Genitourinary: negative  Musculoskeletal: negative  Neurologic: negative  Psychiatric: negative    This document serves as a record of the services and decisions personally performed and made by Afua Wheeler, MS, PA-C. It was created on her behalf by Shonda Love, a trained medical scribe. The creation of this document is based on the provider's statements to the medical scribe.  Shonda Love 11:35 AM December 3, 2019    PHYSICAL EXAM:    /75   Pulse 107   SpO2 96%   Skin exam performed as follows: Type 2 skin. Mood appropriate  Alert and Oriented X 3. Well developed, well nourished in no distress.  General appearance: Normal  Head including face: Normal  Eyes: conjunctiva and lids: Normal  Mouth: Lips, teeth, gums: Normal  Neck: Normal  Chest-breast/axillae: Normal  Back: Normal  Spleen and liver: Normal  Cardiovascular: Exam of peripheral vascular system by observation for swelling, varicosities, edema: Normal  Genitalia: groin, buttocks: Normal  Extremities: digits/nails (clubbing): Normal  Eccrine and Apocrine glands: Normal  Right upper extremity: Normal  Left upper extremity: Normal  Right lower extremity: Normal  Left lower extremity: Normal  Skin: Scalp and body hair: See below    1. Thickened toenails with subungual debris on all toenails; ingrown toenail on right great  toenail    ASSESSMENT/PLAN:     1. Onychomycosis- advised on diagnosis and treatment options. Dicussed culture vs empiric treatment and he would like to have a culture performed and decide on treatment based on results. . Discussed following up with pediatry for nail on right great toe. Discussed Lamisil if culture results are positive for dermatophyte.   --Culture taken today         Follow-up: pending culture  CC:   Scribed By: Shonda Love, Medical Scribe    The information in this document, created by the medical scribe for me, accurately reflects the services I personally performed and the decisions made by me. I have reviewed and approved this document for accuracy prior to leaving the patient care area.  December 3, 2019 11:44 AM    Afua Wheeler MS, PA-C

## 2019-12-12 ENCOUNTER — TELEPHONE (OUTPATIENT)
Dept: DERMATOLOGY | Facility: CLINIC | Age: 64
End: 2019-12-12

## 2019-12-12 NOTE — TELEPHONE ENCOUNTER
Called and spoke to patient.     Educated patient on culture results- nail culture grew yeast. Provider recommends Diflucan once a week for 6 months.     Patient stated he would like to call his insurance company and verify cost of med before moving forward.     Advised patient to call back if he decides to move forward with medication.    Patient voiced understanding.    DILLAN Ceballos-BSN-PHN  [facility]  [phone]

## 2019-12-12 NOTE — TELEPHONE ENCOUNTER
----- Message from Afua Wheeler PA-C sent at 12/11/2019  8:52 PM CST -----  Nail culture grew yeast. For this we recommend diflucan once weekly for 6 months. Would he like to try this? Pharmacy?

## 2019-12-16 ENCOUNTER — TELEPHONE (OUTPATIENT)
Dept: INTERNAL MEDICINE | Facility: CLINIC | Age: 64
End: 2019-12-16

## 2019-12-16 DIAGNOSIS — B49 FUNGAL INFECTION: Primary | ICD-10-CM

## 2019-12-16 NOTE — TELEPHONE ENCOUNTER
Reason for Call:  Other call back    Detailed comments: Morgan called needing the dosage of the recent Rx given by Afua.     Phone Number Patient can be reached at: Home number on file [phone] (home)    Best Time: Noon or later.     Can we leave a detailed message on this number? YES    Call taken on 12/16/2019 at 9:38 AM by Afua Cordova

## 2019-12-16 NOTE — TELEPHONE ENCOUNTER
Called and LM for patient to call back in regards to med dosage.    DILLAN Ceballos-BSN-PHN  [facility]  [phone]

## 2019-12-17 NOTE — TELEPHONE ENCOUNTER
Patient called back.    Patient wants to wait until February to start Diflucan- due to being out of town for 6 weeks.     Advised patient to call back when he would like provider to send Rx to pharmacy.    Patient voiced understanding.    DILLAN Ceballos-BSN-N  [facility]  [phone]

## 2019-12-17 NOTE — TELEPHONE ENCOUNTER
Called and LM for patient to call back in regards to med dosage.    DILLAN Ceballos-BSN-PHN  [facility]  [phone]

## 2019-12-26 ENCOUNTER — TELEPHONE (OUTPATIENT)
Dept: INTERNAL MEDICINE | Facility: CLINIC | Age: 64
End: 2019-12-26

## 2019-12-26 DIAGNOSIS — I10 ESSENTIAL HYPERTENSION, BENIGN: ICD-10-CM

## 2019-12-26 DIAGNOSIS — I10 ESSENTIAL HYPERTENSION, BENIGN: Primary | ICD-10-CM

## 2019-12-26 LAB — POTASSIUM SERPL-SCNC: 3.2 MMOL/L (ref 3.4–5.3)

## 2019-12-26 PROCEDURE — 84132 ASSAY OF SERUM POTASSIUM: CPT | Performed by: INTERNAL MEDICINE

## 2019-12-26 PROCEDURE — 36415 COLL VENOUS BLD VENIPUNCTURE: CPT | Performed by: INTERNAL MEDICINE

## 2020-01-01 LAB
BACTERIA SPEC CULT: ABNORMAL
BACTERIA SPEC CULT: ABNORMAL
SPECIMEN SOURCE: ABNORMAL

## 2020-01-06 DIAGNOSIS — N52.9 IMPOTENCE OF ORGANIC ORIGIN: ICD-10-CM

## 2020-01-06 RX ORDER — SILDENAFIL 100 MG/1
100 TABLET, FILM COATED ORAL DAILY PRN
Qty: 8 TABLET | Refills: 1 | Status: SHIPPED | OUTPATIENT
Start: 2020-01-06 | End: 2021-05-25
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
## 2020-03-03 RX ORDER — FLUCONAZOLE 200 MG/1
TABLET ORAL
Qty: 24 TABLET | Refills: 0 | Status: SHIPPED | OUTPATIENT
Start: 2020-03-03 | End: 2020-11-04

## 2020-03-03 NOTE — TELEPHONE ENCOUNTER
Called and spoke to patient.    Verified pharmacy for Diflucan order- order signed.    Patient voiced understanding.    DILLAN Ceballos-BSN-PHN  [facility]  [phone]

## 2020-04-23 ENCOUNTER — TELEPHONE (OUTPATIENT)
Dept: PODIATRY | Facility: CLINIC | Age: 65
End: 2020-04-23

## 2020-04-23 NOTE — TELEPHONE ENCOUNTER
Reason for Call:  Same Day Appointment, Requested Provider:    Deven Rowe MD    PCP: Garrett Clifford    Reason for visit: Ingrown toenail R big toe    Duration of symptoms:     Have you been treated for this in the past? No    Additional comments:     Can we leave a detailed message on this number? YES    Phone number patient can be reached at: Home number on file [phone] (home)    Best Time: asap    Call taken on 4/23/2020 at 2:04 PM by Afua Cordova

## 2020-04-27 ENCOUNTER — OFFICE VISIT (OUTPATIENT)
Dept: PODIATRY | Facility: CLINIC | Age: 65
End: 2020-04-27
Payer: COMMERCIAL

## 2020-04-27 ENCOUNTER — TELEPHONE (OUTPATIENT)
Dept: INTERNAL MEDICINE | Facility: CLINIC | Age: 65
End: 2020-04-27

## 2020-04-27 VITALS
WEIGHT: 162 LBS | OXYGEN SATURATION: 94 % | BODY MASS INDEX: 41.32 KG/M2 | DIASTOLIC BLOOD PRESSURE: 79 MMHG | HEART RATE: 112 BPM | SYSTOLIC BLOOD PRESSURE: 127 MMHG

## 2020-04-27 DIAGNOSIS — L60.0 INGROWING NAIL WITH INFECTION: Primary | ICD-10-CM

## 2020-04-27 PROCEDURE — 99203 OFFICE O/P NEW LOW 30 MIN: CPT | Mod: 25 | Performed by: PODIATRIST

## 2020-04-27 PROCEDURE — 11730 AVULSION NAIL PLATE SIMPLE 1: CPT | Mod: T5 | Performed by: PODIATRIST

## 2020-04-27 RX ORDER — SULFAMETHOXAZOLE/TRIMETHOPRIM 800-160 MG
1 TABLET ORAL 2 TIMES DAILY
Qty: 14 TABLET | Refills: 0 | Status: SHIPPED | OUTPATIENT
Start: 2020-04-27 | End: 2020-05-04

## 2020-04-27 NOTE — PROGRESS NOTES
PATIENT HISTORY:  Deven Hernandes is a 64 year old male who presents to clinic for R 1st toenail pain, lateral border.  2 week duration.  Redness reported.  Rest can help.  Worse with pressure.  1st occurrence.      Review of Systems:  Patient denies fever, chills, rash, wound, stiffness, limping, numbness, weakness, heart burn, blood in stool, chest pain with activity, calf pain when walking, shortness of breath with activity, chronic cough, easy bleeding/bruising, swelling of ankles, excessive thirst, fatigue, depression, anxiety.       PAST MEDICAL HISTORY:   Past Medical History:   Diagnosis Date     Esophageal reflux      Essential hypertension, benign      Hyperglycemia      Hyperlipidemia      Tobacco use disorder     dced 2003        PAST SURGICAL HISTORY:   Past Surgical History:   Procedure Laterality Date     C NONSPECIFIC PROCEDURE      tonsillectomy     C NONSPECIFIC PROCEDURE  2001    nl colonoscopy     COLONOSCOPY  4-19-17     ESOPHAGOSCOPY, GASTROSCOPY, DUODENOSCOPY (EGD), COMBINED N/A 7/31/2018    Procedure: COMBINED ESOPHAGOSCOPY, GASTROSCOPY, DUODENOSCOPY (EGD), BIOPSY SINGLE OR MULTIPLE;  gastroscopy;  Surgeon: Stephen Skelton MD;  Location:  GI     TONSILLECTOMY      as a child        MEDICATIONS:   Current Outpatient Medications:      amLODIPine (NORVASC) 10 MG tablet, Take 1 tablet (10 mg) by mouth daily, Disp: 90 tablet, Rfl: 3     ASPIRIN 81 MG OR TABS, 1 tab po QD (Once per day), Disp: 0, Rfl: 0     blood glucose (NO BRAND SPECIFIED) lancets standard, Use to test blood sugar 2 times daily or as directed., Disp: 100 each, Rfl: 11     blood glucose calibration (NO BRAND SPECIFIED) solution, Use to calibrate blood glucose monitor as directed., Disp: 1 each, Rfl: 0     blood glucose monitoring (NO BRAND SPECIFIED) meter device kit, Use to test blood sugar 2 times daily or as directed., Disp: 1 kit, Rfl: 0     blood glucose monitoring (NO BRAND SPECIFIED) test strip, Use to test blood sugars  2 times daily or as directed, Disp: 100 strip, Rfl: 11     fluconazole (DIFLUCAN) 200 MG tablet, 1 tab once weekly for 6 months, Disp: 24 tablet, Rfl: 0     losartan-hydrochlorothiazide (HYZAAR) 100-12.5 MG tablet, Take 1 tablet by mouth daily, Disp: 90 tablet, Rfl: 3     METAMUCIL 30.9 % OR POWD, as directed, Disp: 0, Rfl: 0     MULTIVITAMIN CHEW   OR, 1 tablet qd, Disp: , Rfl:      omeprazole (PRILOSEC) 20 MG CR capsule, ONE DAILY, Disp: 90 capsule, Rfl: 3     sildenafil (VIAGRA) 100 MG tablet, Take 1 tablet (100 mg) by mouth daily as needed (do not use with nitro. products), Disp: 8 tablet, Rfl: 1     sulfamethoxazole-trimethoprim (BACTRIM DS) 800-160 MG tablet, Take 1 tablet by mouth 2 times daily for 7 days, Disp: 14 tablet, Rfl: 0     ALLERGIES:    Allergies   Allergen Reactions     Amoxicillin      Codeine      gi     Fish Oil      3000mg --> heartburn     Lansoprazole      diarrhea     Lisinopril      cough     Niacin      heartburn     Penicillins      Verapamil      Constipation          SOCIAL HISTORY:   Social History     Socioeconomic History     Marital status:      Spouse name: Not on file     Number of children: Not on file     Years of education: Not on file     Highest education level: Not on file   Occupational History     Not on file   Social Needs     Financial resource strain: Not on file     Food insecurity     Worry: Not on file     Inability: Not on file     Transportation needs     Medical: Not on file     Non-medical: Not on file   Tobacco Use     Smoking status: Former Smoker     Packs/day: 0.25     Years: 20.00     Pack years: 5.00     Types: Cigarettes     Last attempt to quit: 2003     Years since quittin.4     Smokeless tobacco: Never Used   Substance and Sexual Activity     Alcohol use: Yes     Comment: socially     Drug use: No     Sexual activity: Yes     Partners: Female   Lifestyle     Physical activity     Days per week: Not on file     Minutes per session: Not  on file     Stress: Not on file   Relationships     Social connections     Talks on phone: Not on file     Gets together: Not on file     Attends Buddhism service: Not on file     Active member of club or organization: Not on file     Attends meetings of clubs or organizations: Not on file     Relationship status: Not on file     Intimate partner violence     Fear of current or ex partner: Not on file     Emotionally abused: Not on file     Physically abused: Not on file     Forced sexual activity: Not on file   Other Topics Concern     Parent/sibling w/ CABG, MI or angioplasty before 65F 55M? No   Social History Narrative     Not on file        FAMILY HISTORY:   Family History   Problem Relation Age of Onset     Respiratory Mother         b:1926   emphysema, ?heart problems, HTN     Hypertension Mother      Cerebrovascular Disease Mother      Cancer Father         d:age 82   colon cancer     Colon Cancer Father      Arthritis Sister         b:1950   unsure of type     Family History Negative Brother         b:1948     Diabetes Brother      Diabetes Cousin      Hypertension Brother         EXAM:Vitals: /79   Pulse 112   Wt 73.5 kg (162 lb)   SpO2 94%   BMI 41.32 kg/m    BMI= Body mass index is 41.32 kg/m .    General appearance: Patient is alert and fully cooperative with history & exam.  No sign of distress is noted during the visit.     Psychiatric: Affect is pleasant & appropriate.  Patient appears motivated to improve health.     Respiratory: Breathing is regular & unlabored while sitting.     HEENT: Hearing is intact to spoken word.  Speech is clear.  No gross evidence of visual impairment that would impact ambulation.     Dermatologic: R 1st toenail ingrown along lateral border with local redness, pain, no drainage.  Dystrophic thickened nails noted.     Vascular: DP & PT pulses are intact & regular on the R.  Mild R 1st toe edema. CFT and skin temperature are normal.     Neurologic: Lower extremity  sensation is intact to light touch.  No evidence of weakness or contracture in the lower extremities.  No evidence of neuropathy.     Musculoskeletal: Patient is ambulatory without assistive device or brace.  No gross ankle deformity noted.  No foot or ankle joint effusion is noted.     ASSESSMENT: R 1st toe ingrowing nail with infection     PLAN:  Reviewed patient's chart in epic.  Discussed condition and treatment options including pros and cons.    The potential causes and nature of an ingrown toenail were discussed with the patient.  We reviewed the natural history/prognosis of the condition and potential risks if no treatment is provided.      Treatment options discussed included conservative management (oral antibiotics, soaking of foot, adequate width shoes)  as well as surgical management (partial or total nail removal).  The pros and cons of both forms of treatment were reviewed.      After thorough discussion and answering all questions, the patient elected to proceed with recommended partial nail avulsion.  Discussed risk of recurrence, healing problems, infection.    Procedure:  In addition to office visit.  After consent, the R 1st toe was anesthetized with 5cc's of 1% lidocaine plain after alcohol prep. Betadine prep performed.  A tourniquet was applied to the toe. Using sterile instrumentation, the lateral border was then raised from the nail bed and then cut the length of the nail.  The offending nail border was then removed.  Bacitracin was applied to the nail bed.  The tourniquet was removed and a hyperemic response was noted.  Bandage was applied to the toe.  The patient tolerated the procedure and anesthesia well.    Post op instructions provided and discussed with pt.  F/u prn.  Bactrim prescribed.    Letter given for work.    Luke Muhammad DPM, FACFAS    Weight management plan: Patient was referred to their PCP to discuss a diet and exercise plan.

## 2020-04-27 NOTE — LETTER
[address] [address]  Phone: [phone]    April 27, 2020        Deven Hernandes  [address] [facility] [address]          To whom it may concern:    RE: Deven Hernandes    Patient was seen and treated today at our clinic.    Due to recovery from a procedure, please excuse him from work today and tomorrow, after which he can work without restrictions.    Please contact me for questions or concerns.      Sincerely,        Luke Muhammad DPM

## 2020-04-27 NOTE — LETTER
4/27/2020         RE: Deven Hernandes  [address] [facility] [address]        Dear Colleague,    Thank you for referring your patient, Deven Hernandes, to the [facility]. Please see a copy of my visit note below.    PATIENT HISTORY:  Deven Hernandes is a 64 year old male who presents to clinic for R 1st toenail pain, lateral border.  2 week duration.  Redness reported.  Rest can help.  Worse with pressure.  1st occurrence.      Review of Systems:  Patient denies fever, chills, rash, wound, stiffness, limping, numbness, weakness, heart burn, blood in stool, chest pain with activity, calf pain when walking, shortness of breath with activity, chronic cough, easy bleeding/bruising, swelling of ankles, excessive thirst, fatigue, depression, anxiety.       PAST MEDICAL HISTORY:   Past Medical History:   Diagnosis Date     Esophageal reflux      Essential hypertension, benign      Hyperglycemia      Hyperlipidemia      Tobacco use disorder     dced 2003        PAST SURGICAL HISTORY:   Past Surgical History:   Procedure Laterality Date     C NONSPECIFIC PROCEDURE      tonsillectomy     C NONSPECIFIC PROCEDURE  2001    nl colonoscopy     COLONOSCOPY  4-19-17     ESOPHAGOSCOPY, GASTROSCOPY, DUODENOSCOPY (EGD), COMBINED N/A 7/31/2018    Procedure: COMBINED ESOPHAGOSCOPY, GASTROSCOPY, DUODENOSCOPY (EGD), BIOPSY SINGLE OR MULTIPLE;  gastroscopy;  Surgeon: Stephen Skelton MD;  Location:  GI     TONSILLECTOMY      as a child        MEDICATIONS:   Current Outpatient Medications:      amLODIPine (NORVASC) 10 MG tablet, Take 1 tablet (10 mg) by mouth daily, Disp: 90 tablet, Rfl: 3     ASPIRIN 81 MG OR TABS, 1 tab po QD (Once per day), Disp: 0, Rfl: 0     blood glucose (NO BRAND SPECIFIED) lancets standard, Use to test blood sugar 2 times daily or as directed., Disp: 100 each, Rfl: 11     blood glucose calibration (NO BRAND SPECIFIED) solution, Use to calibrate blood glucose monitor as directed.,  Disp: 1 each, Rfl: 0     blood glucose monitoring (NO BRAND SPECIFIED) meter device kit, Use to test blood sugar 2 times daily or as directed., Disp: 1 kit, Rfl: 0     blood glucose monitoring (NO BRAND SPECIFIED) test strip, Use to test blood sugars 2 times daily or as directed, Disp: 100 strip, Rfl: 11     fluconazole (DIFLUCAN) 200 MG tablet, 1 tab once weekly for 6 months, Disp: 24 tablet, Rfl: 0     losartan-hydrochlorothiazide (HYZAAR) 100-12.5 MG tablet, Take 1 tablet by mouth daily, Disp: 90 tablet, Rfl: 3     METAMUCIL 30.9 % OR POWD, as directed, Disp: 0, Rfl: 0     MULTIVITAMIN CHEW   OR, 1 tablet qd, Disp: , Rfl:      omeprazole (PRILOSEC) 20 MG CR capsule, ONE DAILY, Disp: 90 capsule, Rfl: 3     sildenafil (VIAGRA) 100 MG tablet, Take 1 tablet (100 mg) by mouth daily as needed (do not use with nitro. products), Disp: 8 tablet, Rfl: 1     sulfamethoxazole-trimethoprim (BACTRIM DS) 800-160 MG tablet, Take 1 tablet by mouth 2 times daily for 7 days, Disp: 14 tablet, Rfl: 0     ALLERGIES:    Allergies   Allergen Reactions     Amoxicillin      Codeine      gi     Fish Oil      3000mg --> heartburn     Lansoprazole      diarrhea     Lisinopril      cough     Niacin      heartburn     Penicillins      Verapamil      Constipation          SOCIAL HISTORY:   Social History     Socioeconomic History     Marital status:      Spouse name: Not on file     Number of children: Not on file     Years of education: Not on file     Highest education level: Not on file   Occupational History     Not on file   Social Needs     Financial resource strain: Not on file     Food insecurity     Worry: Not on file     Inability: Not on file     Transportation needs     Medical: Not on file     Non-medical: Not on file   Tobacco Use     Smoking status: Former Smoker     Packs/day: 0.25     Years: 20.00     Pack years: 5.00     Types: Cigarettes     Last attempt to quit: 2003     Years since quittin.4     Smokeless  tobacco: Never Used   Substance and Sexual Activity     Alcohol use: Yes     Comment: socially     Drug use: No     Sexual activity: Yes     Partners: Female   Lifestyle     Physical activity     Days per week: Not on file     Minutes per session: Not on file     Stress: Not on file   Relationships     Social connections     Talks on phone: Not on file     Gets together: Not on file     Attends Yazidi service: Not on file     Active member of club or organization: Not on file     Attends meetings of clubs or organizations: Not on file     Relationship status: Not on file     Intimate partner violence     Fear of current or ex partner: Not on file     Emotionally abused: Not on file     Physically abused: Not on file     Forced sexual activity: Not on file   Other Topics Concern     Parent/sibling w/ CABG, MI or angioplasty before 65F 55M? No   Social History Narrative     Not on file        FAMILY HISTORY:   Family History   Problem Relation Age of Onset     Respiratory Mother         b:[birth year]26   emphysema, ?heart problems, HTN     Hypertension Mother      Cerebrovascular Disease Mother      Cancer Father         d:age 82   colon cancer     Colon Cancer Father      Arthritis Sister         b:1950   unsure of type     Family History Negative Brother         b:1948     Diabetes Brother      Diabetes Cousin      Hypertension Brother         EXAM:Vitals: /79   Pulse 112   Wt 73.5 kg (162 lb)   SpO2 94%   BMI 41.32 kg/m    BMI= Body mass index is 41.32 kg/m .    General appearance: Patient is alert and fully cooperative with history & exam.  No sign of distress is noted during the visit.     Psychiatric: Affect is pleasant & appropriate.  Patient appears motivated to improve health.     Respiratory: Breathing is regular & unlabored while sitting.     HEENT: Hearing is intact to spoken word.  Speech is clear.  No gross evidence of visual impairment that would impact ambulation.     Dermatologic: R 1st toenail  ingrown along lateral border with local redness, pain, no drainage.  Dystrophic thickened nails noted.     Vascular: DP & PT pulses are intact & regular on the R.  Mild R 1st toe edema. CFT and skin temperature are normal.     Neurologic: Lower extremity sensation is intact to light touch.  No evidence of weakness or contracture in the lower extremities.  No evidence of neuropathy.     Musculoskeletal: Patient is ambulatory without assistive device or brace.  No gross ankle deformity noted.  No foot or ankle joint effusion is noted.     ASSESSMENT: R 1st toe ingrowing nail with infection     PLAN:  Reviewed patient's chart in epic.  Discussed condition and treatment options including pros and cons.    The potential causes and nature of an ingrown toenail were discussed with the patient.  We reviewed the natural history/prognosis of the condition and potential risks if no treatment is provided.      Treatment options discussed included conservative management (oral antibiotics, soaking of foot, adequate width shoes)  as well as surgical management (partial or total nail removal).  The pros and cons of both forms of treatment were reviewed.      After thorough discussion and answering all questions, the patient elected to proceed with recommended partial nail avulsion.  Discussed risk of recurrence, healing problems, infection.    Procedure:  In addition to office visit.  After consent, the R 1st toe was anesthetized with 5cc's of 1% lidocaine plain after alcohol prep. Betadine prep performed.  A tourniquet was applied to the toe. Using sterile instrumentation, the lateral border was then raised from the nail bed and then cut the length of the nail.  The offending nail border was then removed.  Bacitracin was applied to the nail bed.  The tourniquet was removed and a hyperemic response was noted.  Bandage was applied to the toe.  The patient tolerated the procedure and anesthesia well.    Post op instructions provided  and discussed with pt.  F/u prn.  Bactrim prescribed.    Letter given for work.    Luke Muhammad DPM, FACFAS    Weight management plan: Patient was referred to their PCP to discuss a diet and exercise plan.      Again, thank you for allowing me to participate in the care of your patient.        Sincerely,        Luke Muhammad DPM

## 2020-04-27 NOTE — PATIENT INSTRUCTIONS
Thank you for choosing [facility]!    Follow up as needed    DR. WHITE'S CLINIC LOCATIONS     MONDAY AM  [place] TUESDAY PM  [place]   [address] [facility]   [address]   [place] [address] [place] [address]   [phone]  [phone]  [phone]       SCHEDULE SURGERY: [phone] TRIAGE RN:  [phone]   APPOINTMENTS: [phone]    BILLING QUESTIONS: [phone]             INGROWN TOENAIL  When a toenail is ingrown, it is curved and grows into the skin, usually at the nail borders (the sides of the nail). This  digging in  of the nail irritates the skin, often creating pain, redness, swelling, and warmth in the toe.  If an ingrown nail causes a break in the skin, bacteria may enter and cause an infection in the area, which is often marked by drainage and a foul odor. However, even if the toe isn t painful, red, swollen, or warm, a nail that curves downward into the skin can progress to an infection.  CAUSES  Causes of ingrown toenails include:    Heredity. In many people, the tendency for ingrown toenails is inherited.     Trauma. Sometimes an ingrown toenail is the result of trauma, such as stubbing your toe, having an object fall on your toe, or engaging in activities that involve repeated pressure on the toes, such as kicking or running.     Improper trimming. The most common cause of ingrown toenails is cutting your nails too short. This encourages the skin next to the nail to fold over the nail.     Improperly sized footwear. Ingrown toenails can result from wearing socks and shoes that are tight or short.     Nail Conditions. Ingrown toenails can be caused by nail problems, such as fungal infections or losing a nail due to trauma.   TREATMENT  Sometimes initial treatment for ingrown toenails can be safely performed at home. However, home treatment is strongly discouraged if an infection is suspected, or for those who have medical  conditions that put feet at high risk, such as diabetes, nerve damage in the foot, or poor circulation.  Home care:  If you don t have an infection or any of the above medical conditions, you can soak your foot in room-temperature water (adding Epsom s salt may be recommended by your doctor), and gently massage the side of the nail fold to help reduce the inflammation.  Avoid attempting  bathroom surgery.  Repeated cutting of the nail can cause the condition to worsen over time. If your symptoms fail to improve, it s time to see a foot and ankle surgeon.  Physician care:  After examining the toe, the foot and ankle surgeon will select the treatment best suited for you. If an infection is present, an oral antibiotic may be prescribed.  Sometimes a minor surgical procedure, often performed in the office, will ease the pain and remove the offending nail. After applying a local anesthetic, the doctor removes part of the nail s side border. Some nails may become ingrown again, requiring removal of the nail root.  Following the nail procedure, a light bandage will be applied. Most people experience very little pain after surgery and may resume normal activity the next day. If your surgeon has prescribed an oral antibiotic, be sure to take all the medication, even if your symptoms have improved.    PREVENTION  Many cases of ingrown toenails may be prevented by:    Proper trimming. Cut toenails in a fairly straight line, and don t cut them too short. You should be able to get your fingernail under the sides and end of the nail.     Well-fitted shoes and socks. Don t wear shoes that are short or tight in the toe area. Avoid shoes that are loose, because they too cause pressure on the toes, especially when running or walking briskly.     INGROWN TOENAIL POSTOPERATIVE INSTRUCTIONS   (Nail avulsion or chemical matrixectomy)   Go directly home and elevate the affected foot on one or two pillows for the remainder of the  day/evening. Your toe may stay numb for the next 2-8 hours.   Take Tylenol, ibuprofen or another anti-inflammatory as needed for pain.   Take antibiotic if that has been prescribed. Take the entire prescribed antibiotic even if your symptoms have improved.   Keep dressing dry and intact the day of the procedure. The morning after the procedure, remove entire dressing and soak/wash the affected area in warm water (you may add Epsom salt) for 5 to 10 minutes. Do this twice a day for 2-4 weeks (6-8 weeks if you had phenol) (you may count showering/bathing as one soak).  After soaks, pat the area dry and then allow to airdry for a few minutes. Apply antibiotic ointment to the area and cover with 2 X 2 gauze and paper tape or a band-aid.  May walk and pursue everyday activities as tolerated with either an open toe shoe or cut-out shoe as needed. May wear regular shoes if no pain is noted.  Watch for any signs and symptoms of infection such as: redness, red streaks going up the foot/leg, swelling, pus or foul odor. Those that have had the phenol procedure, the toe will drain longer and will look like it is infected because it is a chemical burn.  Please call with questions.      BODY WEIGHT AND YOUR FEET  The following information is included in the after visit summary for all patients. Body weight can be a sensitive issue to discuss in clinic, but we think the following information is very important. Although we focus on the feet and ankles, we do support the overall health of our patients.     Many things can cause foot and ankle problems. Foot structure, activity level, foot mechanics and injuries are common causes of pain. One very important issue that often goes unmentioned, is body weight. Extra weight can cause increased stress on muscles, ligaments, bones and tendons. Sometimes just a few extra pounds is all it takes to put one over her/his threshold. Without reducing that stress, it can be difficult to alleviate  pain. As Foot & Ankle specialists, our job is addressing the lower extremity problem and possible causes. Regarding extra body weight, we encourage patients to discuss diet and weight management plans with their primary care doctors. It is this team approach that gives you the best opportunity for pain relief and getting you back on your feet.      [facility] has a Comprehensive Weight Management Program. This program includes counseling, education, non-surgical and surgical approaches to weight loss. If you are interested in learning more either talk to you primary care provider or call [phone].

## 2020-04-27 NOTE — TELEPHONE ENCOUNTER
Reason for Call:  Medication or medication refill:    Do you use a [facility]?  Name of the pharmacy and phone number for the current request:       [id number] [address] AT [facility] [address]      Name of the medication requested: sulfamethoxazole-trimethoprim (BACTRIM DS) 800-160 MG tablet     Other request:     Can we leave a detailed message on this number? YES    Phone number patient can be reached at: Home number on file [phone] (home)    Best Time: any    Call taken on 4/27/2020 at 12:06 PM by Michelle Victor

## 2020-07-02 ENCOUNTER — TRANSFERRED RECORDS (OUTPATIENT)
Dept: HEALTH INFORMATION MANAGEMENT | Facility: CLINIC | Age: 65
End: 2020-07-02

## 2020-08-13 DIAGNOSIS — B49 FUNGAL INFECTION: ICD-10-CM

## 2020-08-13 RX ORDER — FLUCONAZOLE 200 MG/1
TABLET ORAL
Qty: 24 TABLET | Refills: 0 | OUTPATIENT
Start: 2020-08-13

## 2020-11-04 ENCOUNTER — OFFICE VISIT (OUTPATIENT)
Dept: INTERNAL MEDICINE | Facility: CLINIC | Age: 65
End: 2020-11-04
Payer: MEDICARE

## 2020-11-04 VITALS
SYSTOLIC BLOOD PRESSURE: 108 MMHG | OXYGEN SATURATION: 98 % | BODY MASS INDEX: 30.22 KG/M2 | DIASTOLIC BLOOD PRESSURE: 70 MMHG | WEIGHT: 153.9 LBS | HEIGHT: 60 IN | RESPIRATION RATE: 14 BRPM | TEMPERATURE: 97.9 F | HEART RATE: 92 BPM

## 2020-11-04 DIAGNOSIS — Z12.11 COLON CANCER SCREENING: ICD-10-CM

## 2020-11-04 DIAGNOSIS — Z00.00 ENCOUNTER FOR MEDICARE ANNUAL WELLNESS EXAM: Primary | ICD-10-CM

## 2020-11-04 DIAGNOSIS — Z12.5 SCREENING PSA (PROSTATE SPECIFIC ANTIGEN): ICD-10-CM

## 2020-11-04 DIAGNOSIS — I10 ESSENTIAL HYPERTENSION, BENIGN: ICD-10-CM

## 2020-11-04 DIAGNOSIS — Z13.6 SCREENING FOR ABDOMINAL AORTIC ANEURYSM (AAA) PERFORMED: ICD-10-CM

## 2020-11-04 DIAGNOSIS — E78.5 HYPERLIPIDEMIA LDL GOAL <130: ICD-10-CM

## 2020-11-04 DIAGNOSIS — E66.01 MORBID OBESITY (H): ICD-10-CM

## 2020-11-04 DIAGNOSIS — R35.0 URINARY FREQUENCY: ICD-10-CM

## 2020-11-04 LAB
ALBUMIN SERPL-MCNC: 4 G/DL (ref 3.4–5)
ALP SERPL-CCNC: 77 U/L (ref 40–150)
ALT SERPL W P-5'-P-CCNC: 27 U/L (ref 0–70)
ANION GAP SERPL CALCULATED.3IONS-SCNC: 7 MMOL/L (ref 3–14)
AST SERPL W P-5'-P-CCNC: 14 U/L (ref 0–45)
BILIRUB SERPL-MCNC: 1 MG/DL (ref 0.2–1.3)
BUN SERPL-MCNC: 17 MG/DL (ref 7–30)
CALCIUM SERPL-MCNC: 9.3 MG/DL (ref 8.5–10.1)
CHLORIDE SERPL-SCNC: 108 MMOL/L (ref 94–109)
CHOLEST SERPL-MCNC: 203 MG/DL
CO2 SERPL-SCNC: 22 MMOL/L (ref 20–32)
CREAT SERPL-MCNC: 0.83 MG/DL (ref 0.66–1.25)
GFR SERPL CREATININE-BSD FRML MDRD: >90 ML/MIN/{1.73_M2}
GLUCOSE SERPL-MCNC: 126 MG/DL (ref 70–99)
HDLC SERPL-MCNC: 35 MG/DL
HGB BLD-MCNC: 15.1 G/DL (ref 13.3–17.7)
LDLC SERPL CALC-MCNC: 131 MG/DL
NONHDLC SERPL-MCNC: 168 MG/DL
POTASSIUM SERPL-SCNC: 3.2 MMOL/L (ref 3.4–5.3)
PROT SERPL-MCNC: 7.3 G/DL (ref 6.8–8.8)
PSA SERPL-ACNC: 4.05 UG/L (ref 0–4)
SODIUM SERPL-SCNC: 137 MMOL/L (ref 133–144)
TRIGL SERPL-MCNC: 186 MG/DL

## 2020-11-04 PROCEDURE — G0438 PPPS, INITIAL VISIT: HCPCS | Performed by: INTERNAL MEDICINE

## 2020-11-04 PROCEDURE — 99214 OFFICE O/P EST MOD 30 MIN: CPT | Mod: 25 | Performed by: INTERNAL MEDICINE

## 2020-11-04 PROCEDURE — G0103 PSA SCREENING: HCPCS | Performed by: INTERNAL MEDICINE

## 2020-11-04 PROCEDURE — 85018 HEMOGLOBIN: CPT | Performed by: INTERNAL MEDICINE

## 2020-11-04 PROCEDURE — 80061 LIPID PANEL: CPT | Performed by: INTERNAL MEDICINE

## 2020-11-04 PROCEDURE — 80053 COMPREHEN METABOLIC PANEL: CPT | Performed by: INTERNAL MEDICINE

## 2020-11-04 PROCEDURE — 36415 COLL VENOUS BLD VENIPUNCTURE: CPT | Performed by: INTERNAL MEDICINE

## 2020-11-04 PROCEDURE — 90732 PPSV23 VACC 2 YRS+ SUBQ/IM: CPT | Performed by: INTERNAL MEDICINE

## 2020-11-04 PROCEDURE — G0009 ADMIN PNEUMOCOCCAL VACCINE: HCPCS | Performed by: INTERNAL MEDICINE

## 2020-11-04 RX ORDER — LOSARTAN POTASSIUM AND HYDROCHLOROTHIAZIDE 12.5; 1 MG/1; MG/1
1 TABLET ORAL DAILY
Qty: 90 TABLET | Refills: 3 | Status: CANCELLED | OUTPATIENT
Start: 2020-11-04

## 2020-11-04 RX ORDER — AMLODIPINE BESYLATE 10 MG/1
10 TABLET ORAL DAILY
Qty: 90 TABLET | Refills: 3 | Status: SHIPPED | OUTPATIENT
Start: 2020-11-04 | End: 2022-01-12

## 2020-11-04 RX ORDER — LOSARTAN POTASSIUM 100 MG/1
100 TABLET ORAL DAILY
Qty: 90 TABLET | Refills: 0 | Status: SHIPPED | OUTPATIENT
Start: 2020-11-04 | End: 2020-12-31

## 2020-11-04 ASSESSMENT — ACTIVITIES OF DAILY LIVING (ADL): CURRENT_FUNCTION: NO ASSISTANCE NEEDED

## 2020-11-04 ASSESSMENT — MIFFLIN-ST. JEOR: SCORE: 1206.53

## 2020-11-04 NOTE — PROGRESS NOTES
"  SUBJECTIVE:   Deven Hernandes is a 65 year old male who presents for Preventive Visit.      Patient has been advised of split billing requirements and indicates understanding: Yes  Are you in the first 12 months of your Medicare Part B coverage?  No  Answers for HPI/ROS submitted by the patient on 2020   Annual Exam:  In general, how would you rate your overall physical health?: good  Frequency of exercise:: 4-5 days/week  Do you usually eat at least 4 servings of fruit and vegetables a day, include whole grains & fiber, and avoid regularly eating high fat or \"junk\" foods? : Yes  Taking medications regularly:: Yes  Medication side effects:: None  Activities of Daily Living: no assistance needed  Home safety: no safety concerns identified  Hearing Impairment:: no hearing concerns  In the past 6 months, have you been bothered by leaking of urine?: Yes  In general, how would you rate your overall mental or emotional health?: good  Additional concerns today:: Yes  Duration of exercise:: 15-30 minutes      Mental Health:    In general, how would you rate your overall mental or emotional health? good  PHQ-2 Score:      Do you feel safe in your environment? Yes    Have you ever done Advance Care Planning? (For example, a Health Directive, POLST, or a discussion with a medical provider or your loved ones about your wishes): No, advance care planning information given to patient to review.  Patient declined advance care planning discussion at this time.    Additional concerns to address?  No    Fall risk:  Fallen 2 or more times in the past year?: No  Any fall with injury in the past year?: No  Cognitive Screenin) Repeat 3 items (Leader, Season, Table)    2) Clock draw: NORMAL  3) 3 item recall: Recalls 3 objects  Results: 3 items recalled: COGNITIVE IMPAIRMENT LESS LIKELY    Mini-CogTM Copyright S [employer]. Licensed by the author for use in [employer]; reprinted with permission (berna@.edu). All " rights reserved.      Do you have sleep apnea, excessive snoring or daytime drowsiness?: no    PROBLEMS TO ADD ON...  1. Urinary frequency ongoing as an issue, noted on hydrochlorothiazide, does use ETOH somewhat.  Noted nocturia.    Reviewed and updated as needed this visit by clinical staff                 Reviewed and updated as needed this visit by Provider                Social History     Tobacco Use     Smoking status: Former Smoker     Packs/day: 0.25     Years: 20.00     Pack years: 5.00     Types: Cigarettes     Quit date: 2003     Years since quittin.9     Smokeless tobacco: Never Used   Substance Use Topics     Alcohol use: Yes     Comment: socially                           Current providers sharing in care for this patient include:   Patient Care Team:  Garrett Clifford MD as PCP - General (Internal Medicine)  Stephen Szymanski MD as MD (Gastroenterology)  Garrett Clifford MD as Assigned PCP  Luke Muhammad DPM as Assigned Musculoskeletal Provider    The following health maintenance items are reviewed in Epic and correct as of today:  Health Maintenance   Topic Date Due     ZOSTER IMMUNIZATION (1 of 2) 2004     Pneumococcal Vaccine: 65+ Years (1 of 1 - PPSV23) 2019     AORTIC ANEURYSM SCREENING (SYSTEM ASSIGNED)  2019     PHQ-2  2020     DTAP/TDAP/TD IMMUNIZATION (3 - Td) 2020     INFLUENZA VACCINE (1) 2020     LIPID  10/22/2020     FALL RISK ASSESSMENT  10/22/2020     MEDICARE ANNUAL WELLNESS VISIT  10/22/2020     ADVANCE CARE PLANNING  10/22/2024     COLORECTAL CANCER SCREENING  2025     HEPATITIS C SCREENING  Completed     Pneumococcal Vaccine: Pediatrics (0 to 5 Years) and At-Risk Patients (6 to 64 Years)  Aged Out     IPV IMMUNIZATION  Aged Out     MENINGITIS IMMUNIZATION  Aged Out     HEPATITIS B IMMUNIZATION  Aged Out       Immunization History   Administered Date(s) Administered     Influenza (High Dose) 3 valent vaccine 10/13/2019      "Influenza (IIV3) PF 11/24/2010, 10/27/2011, 10/19/2012, 10/01/2014, 10/02/2016     Influenza Vaccine IM > 6 months Valent IIV4 11/14/2018     TD (ADULT, 7+) 01/01/1998     TDAP Vaccine (Adacel) 03/03/2010         ROS:  CONSTITUTIONAL: NEGATIVE for fever, chills, change in weight  EYES: NEGATIVE for vision changes or irritation  ENT/MOUTH: NEGATIVE for ear, mouth and throat problems  RESP: NEGATIVE for significant cough or SOB  CV: NEGATIVE for chest pain, palpitations or peripheral edema  GI: NEGATIVE for nausea, abdominal pain, heartburn, or change in bowel habits  MUSCULOSKELETAL: NEGATIVE for significant arthralgias or myalgia  NEURO: NEGATIVE for weakness, dizziness or paresthesias  ENDOCRINE: NEGATIVE for temperature intolerance, skin/hair changes  HEME: NEGATIVE for bleeding problems  PSYCHIATRIC: NEGATIVE for changes in mood or affect    OBJECTIVE:   /70   Pulse 92   Temp 97.9  F (36.6  C) (Temporal)   Resp 14   Ht 1.334 m (4' 4.5\")   Wt 69.8 kg (153 lb 14.4 oz)   SpO2 98%   BMI 39.26 kg/m   Estimated body mass index is 41.32 kg/m  as calculated from the following:    Height as of 10/22/19: 1.334 m (4' 4.5\").    Weight as of 4/27/20: 73.5 kg (162 lb).  EXAM:   GENERAL: healthy, alert and no distress  EYES: Eyes grossly normal to inspection, PERRL and conjunctivae and sclerae normal  HENT: ear canals and TM's normal, nose and mouth without ulcers or lesions  NECK: no adenopathy, no asymmetry, masses, or scars and thyroid normal to palpation  RESP: lungs clear to auscultation - no rales, rhonchi or wheezes  CV: regular rate and rhythm, normal S1 S2, no S3 or S4, no  click or rub, no peripheral edema and peripheral pulses strong  ABDOMEN: soft, nontender, no hepatosplenomegaly, no masses and bowel sounds normal  RECTAL: normal sphincter tone, no rectal masses, prostate prominent in size, smooth, nontender.  MS: no gross musculoskeletal defects noted, no edema  NEURO: Normal strength and tone, " mentation intact and speech normal  PSYCH: mentation appears normal, affect normal/bright.  There is a small superficial papular skin lesion noted to the right upper chest wall just below the midclavicular line.      ASSESSMENT / PLAN:   1. Encounter for Medicare annual wellness exam  Is updated shingles vaccination and tetanus booster for patient.  - Hemoglobin  - Comprehensive metabolic panel  - Lipid Profile    Charo also with patient benefit of seeing dermatology to have small skin lesion evaluated and potentially removed right upper chest wall    2. Essential hypertension, benign  Stable on therapy continue with current medical management with dose adjustment as listed below  - Comprehensive metabolic panel  - amLODIPine (NORVASC) 10 MG tablet; Take 1 tablet (10 mg) by mouth daily  Dispense: 90 tablet; Refill: 3    3. Hyperlipidemia LDL goal <130  Labs ordered as fasting per routine  - Lipid Profile    4. Obesity (BMI 35.0-39.9) with comorbidity (H)  Encouraged ongoing weight loss.    5. Screening PSA (prostate specific antigen)  Ordered as routine screening based on age  - PSA, screen    6. Colon cancer screening  Prior colonoscopy just reviewed and recommend repeat in 3 years    7. Urinary frequency  Discussed with patient decreasing fluid and intake, especially alcohol use.  Decrease hydrochlorothiazide continue with losartan.  Noted slightly prominent prostate on exam suspect contributing.  Suggest a urology evaluation if no improvement  - UROLOGY ADULT REFERRAL; Future  - losartan (COZAAR) 100 MG tablet; Take 1 tablet (100 mg) by mouth daily  Dispense: 90 tablet; Refill: 0  - OFFICE/OUTPT VISIT,EST,LEVL III    8. Screening for abdominal aortic aneurysm (AAA) performed  Recommended routine annual screening  - US Aorta Medicare AAA Screening; Future    Patient has been advised of split billing requirements and indicates understanding: Yes    COUNSELING:  Reviewed preventive health counseling, as reflected in  "patient instructions       Regular exercise       Healthy diet/nutrition    Estimated body mass index is 41.32 kg/m  as calculated from the following:    Height as of 10/22/19: 1.334 m (4' 4.5\").    Weight as of 4/27/20: 73.5 kg (162 lb).      He reports that he quit smoking about 16 years ago. His smoking use included cigarettes. He has a 5.00 pack-year smoking history. He has never used smokeless tobacco.    Appropriate preventive services were discussed with this patient, including applicable screening as appropriate for cardiovascular disease, diabetes, osteopenia/osteoporosis, and glaucoma.  As appropriate for age/gender, discussed screening for colorectal cancer, prostate cancer, breast cancer, and cervical cancer. Checklist reviewing preventive services available has been given to the patient.    Reviewed patients plan of care and provided an AVS. The Basic Care Plan (routine screening as documented in Health Maintenance) for Deven meets the Care Plan requirement. This Care Plan has been established and reviewed with the Patient.    Counseling Resources:  ATP IV Guidelines  Pooled Cohorts Equation Calculator  Breast Cancer Risk Calculator  BRCA-Related Cancer Risk Assessment: FHS-7 Tool  FRAX Risk Assessment  ICSI Preventive Guidelines  Dietary Guidelines for Americans, 2010  [employer] MyPlate  ASA Prophylaxis  Lung CA Screening    Garrett Clifford MD  [facility]  "

## 2020-11-04 NOTE — LETTER
[facility]  [address] [address]  [phone]      11/16/2020       Deven Hernandes  [address] [address]        Dear Deven,    I did send you a [employer] message also but was unsure if you were checking it.    Just following up again as a quick reminder to make sure that you make contact with me in regards to your slightly elevated PSA level when last checked.    In addition, your blood sugar remains slightly elevated as does your cholesterol and your potassium returned slightly low and should probably be discussed and recheck.    Sincerely,      Garrett Clifford MD  Internal Medicine

## 2020-11-04 NOTE — LETTER
[facility]  [address] [address]  [phone]      11/5/2020       Deven Hernandes  [address] [facility] [address]        Dear Deven,    Your hemoglobin is normal.     Your basic panel is stable although your potassium is still slightly low.  I think we need to be much more aggressive with your dietary intake of potassium and consider rechecking a potassium level in about a week or so.  We can do that when I see you at the clinic to potentially discuss the issues as listed below.     Your blood sugar level is elevated and needs to be addressed as at its current level is consistent with a potential diagnosis of diabetes and could be better controlled.  Please follow-up in the clinic to discuss some changes that need to be done.     Your PSA level is again slightly elevated from when last checked and probably should again at least be discussed with me.     Your cholesterol is slightly high and could be treated more aggressively with better diet, exercise and weight loss.  Please follow-up with me to discuss your further medication options/changes if you are interested.  In addition, your liver function tests are completely normal and should be rechecked with your next cholesterol level.     Sincerely,      Garrett Clifford MD  Internal Medicine

## 2020-11-12 ENCOUNTER — HOSPITAL ENCOUNTER (OUTPATIENT)
Dept: ULTRASOUND IMAGING | Facility: CLINIC | Age: 65
Discharge: HOME | End: 2020-11-12
Attending: INTERNAL MEDICINE | Admitting: INTERNAL MEDICINE
Payer: MEDICARE

## 2020-11-12 DIAGNOSIS — Z13.6 SCREENING FOR ABDOMINAL AORTIC ANEURYSM (AAA) PERFORMED: ICD-10-CM

## 2020-11-12 PROCEDURE — 76706 US ABDL AORTA SCREEN AAA: CPT

## 2020-11-23 ENCOUNTER — OFFICE VISIT (OUTPATIENT)
Dept: INTERNAL MEDICINE | Facility: CLINIC | Age: 65
End: 2020-11-23
Payer: MEDICARE

## 2020-11-23 VITALS
TEMPERATURE: 98.4 F | DIASTOLIC BLOOD PRESSURE: 78 MMHG | WEIGHT: 159.1 LBS | OXYGEN SATURATION: 96 % | RESPIRATION RATE: 15 BRPM | BODY MASS INDEX: 40.58 KG/M2 | SYSTOLIC BLOOD PRESSURE: 128 MMHG | HEART RATE: 95 BPM

## 2020-11-23 DIAGNOSIS — E78.5 HYPERLIPIDEMIA LDL GOAL <130: ICD-10-CM

## 2020-11-23 DIAGNOSIS — R97.20 ELEVATED PROSTATE SPECIFIC ANTIGEN (PSA): ICD-10-CM

## 2020-11-23 DIAGNOSIS — E87.6 HYPOKALEMIA: Primary | ICD-10-CM

## 2020-11-23 DIAGNOSIS — R73.9 HYPERGLYCEMIA: ICD-10-CM

## 2020-11-23 DIAGNOSIS — Z12.5 SCREENING PSA (PROSTATE SPECIFIC ANTIGEN): ICD-10-CM

## 2020-11-23 LAB
ANION GAP SERPL CALCULATED.3IONS-SCNC: 3 MMOL/L (ref 3–14)
BUN SERPL-MCNC: 14 MG/DL (ref 7–30)
CALCIUM SERPL-MCNC: 9.8 MG/DL (ref 8.5–10.1)
CHLORIDE SERPL-SCNC: 105 MMOL/L (ref 94–109)
CO2 SERPL-SCNC: 29 MMOL/L (ref 20–32)
CREAT SERPL-MCNC: 0.97 MG/DL (ref 0.66–1.25)
GFR SERPL CREATININE-BSD FRML MDRD: 81 ML/MIN/{1.73_M2}
GLUCOSE SERPL-MCNC: 150 MG/DL (ref 70–99)
HBA1C MFR BLD: 6.5 % (ref 0–5.6)
POTASSIUM SERPL-SCNC: 3.2 MMOL/L (ref 3.4–5.3)
SODIUM SERPL-SCNC: 137 MMOL/L (ref 133–144)

## 2020-11-23 PROCEDURE — 99214 OFFICE O/P EST MOD 30 MIN: CPT | Performed by: INTERNAL MEDICINE

## 2020-11-23 PROCEDURE — 83036 HEMOGLOBIN GLYCOSYLATED A1C: CPT | Performed by: INTERNAL MEDICINE

## 2020-11-23 PROCEDURE — 80048 BASIC METABOLIC PNL TOTAL CA: CPT | Performed by: INTERNAL MEDICINE

## 2020-11-23 PROCEDURE — 36415 COLL VENOUS BLD VENIPUNCTURE: CPT | Performed by: INTERNAL MEDICINE

## 2020-11-23 RX ORDER — ATORVASTATIN CALCIUM 10 MG/1
10 TABLET, FILM COATED ORAL DAILY
Qty: 90 TABLET | Refills: 1 | Status: SHIPPED | OUTPATIENT
Start: 2020-11-23 | End: 2021-05-20

## 2020-11-23 NOTE — LETTER
[facility]  [address] [address]  [phone]      11/23/2020       Deven Hernandes  [address] [facility] [address]        Dear Deven,    I sent you a [employer] message also.    Your hemoglobin A1c level returned slightly elevated but it is well within the range of we could try to control things with diet if able.  Please follow through with the diabetes educator as we discussed and let us consider rechecking your A1c level in 3 months for comparison.  Your other medications should remain the same.    Your potassium level is still slightly low at 3.2.  I would consider the over-the-counter medication we discussed as a replacement for your potassium.  I would then like to recheck it in 2 weeks for comparison.  I will place a lab appointment orders for you so that you can get your potassium checked again without seeing me and would suggest that you are liberal in your dietary intake of potassium also.    Sincerely,      Garrett Clifford MD  Internal Medicine

## 2020-11-23 NOTE — PROGRESS NOTES
Subjective     Deven Hernandes is a 65 year old male who presents to clinic today for the following health issues:    HPI       Follow up potassium, PSA, glucose and cholesterol from 11/4/20    Component      Latest Ref Rng & Units 10/22/2019 11/4/2020   Sodium      133 - 144 mmol/L  137   Potassium      3.4 - 5.3 mmol/L  3.2 (L)   Chloride      94 - 109 mmol/L  108   Carbon Dioxide      20 - 32 mmol/L  22   Anion Gap      3 - 14 mmol/L  7   Glucose      70 - 99 mg/dL  126 (H)   Urea Nitrogen      7 - 30 mg/dL  17   Creatinine      0.66 - 1.25 mg/dL  0.83   GFR Estimate      >60 mL/min/1.73:m2  >90   GFR Estimate If Black      >60 mL/min/1.73:m2  >90   Calcium      8.5 - 10.1 mg/dL  9.3   Bilirubin Total      0.2 - 1.3 mg/dL  1.0   Albumin      3.4 - 5.0 g/dL  4.0   Protein Total      6.8 - 8.8 g/dL  7.3   Alkaline Phosphatase      40 - 150 U/L  77   ALT      0 - 70 U/L  27   AST      0 - 45 U/L  14   Cholesterol      <200 mg/dL  203 (H)   Triglycerides      <150 mg/dL  186 (H)   HDL Cholesterol      >39 mg/dL  35 (L)   LDL Cholesterol Calculated      <100 mg/dL  131 (H)   Non HDL Cholesterol      <130 mg/dL  168 (H)   PSA      0 - 4 ug/L 3.72 4.05 (H)         Review of Systems   CONSTITUTIONAL: NEGATIVE for fever, chills, change in weight  INTEGUMENTARY/SKIN: NEGATIVE for worrisome rashes, moles or lesions  EYES: NEGATIVE for vision changes or irritation  ENT/MOUTH: NEGATIVE for ear, mouth and throat problems  RESP: NEGATIVE for significant cough or SOB  CV: NEGATIVE for chest pain, palpitations or peripheral edema  GI: NEGATIVE for nausea, abdominal pain, heartburn, or change in bowel habits  : NEGATIVE for frequency, dysuria, or hematuria  MUSCULOSKELETAL: NEGATIVE for significant arthralgias or myalgia  NEURO: NEGATIVE for weakness, dizziness or paresthesias  ENDOCRINE: NEGATIVE for temperature intolerance, skin/hair changes  HEME: NEGATIVE for bleeding problems  PSYCHIATRIC: NEGATIVE for changes in mood or  affect      Objective    /78   Pulse 95   Temp 98.4  F (36.9  C) (Temporal)   Resp 15   Wt 72.2 kg (159 lb 1.6 oz)   SpO2 96%   BMI 40.58 kg/m    Body mass index is 40.58 kg/m .  Physical Exam   GENERAL: healthy, alert and no distress  EYES: Eyes grossly normal to inspection, PERRL and conjunctivae and sclerae normal  HENT: ear canals and TM's normal, nose and mouth without ulcers or lesions  NECK: no adenopathy, no asymmetry, masses, or scars and thyroid normal to palpation  RESP: lungs clear to auscultation - no rales, rhonchi or wheezes  CV: regular rate and rhythm  MS: no gross musculoskeletal defects noted  NEURO: Normal strength and tone, mentation intact and speech normal  PSYCH: mentation appears normal, affect normal/bright        Assessment & Plan     Hypokalemia  Recheck potassium today and continue with dietary supplementation.  - Basic metabolic panel    Hyperglycemia  Borderline diagnosis of diabetes if not formal.  Suggest recheck A1c and diabetic educators for glucose monitoring and observation.  - AMBULATORY ADULT DIABETES EDUCATOR REFERRAL; Future  - Basic metabolic panel  - Hemoglobin A1c    Elevated prostate specific antigen (PSA)  Discussed options available for elevated PSA patient would just like to recheck in 3 to 6 months for which orders have been placed.    Hyperlipidemia LDL goal <130  Start statin.  Discussed with patient dosing as well as risks and side effects.  Advised to avoid grapefruit juice.  Discussed issues of liver function abnormalities and myalgias.  - atorvastatin (LIPITOR) 10 MG tablet; Take 1 tablet (10 mg) by mouth daily  - Hepatic panel; Future  - Lipid Profile; Future    Screening PSA (prostate specific antigen)  Recheck 3 months as ordered.  Patient given lab slip appointment  - PSA, screen; Future        See Patient Instructions    Return in about 3 months (around 2/23/2021) for Lab Work appointment, diabetes educator referral.  Advised again of the  benefit of updating shingles vaccination and tetanus booster in his local pharmacy    Garrett Clifford MD  [facility]

## 2020-12-17 DIAGNOSIS — E78.5 HYPERLIPIDEMIA LDL GOAL <130: ICD-10-CM

## 2020-12-17 DIAGNOSIS — E87.6 HYPOKALEMIA: ICD-10-CM

## 2020-12-23 DIAGNOSIS — E87.6 HYPOKALEMIA: ICD-10-CM

## 2020-12-23 DIAGNOSIS — E78.5 HYPERLIPIDEMIA LDL GOAL <130: ICD-10-CM

## 2020-12-23 LAB
ALBUMIN SERPL-MCNC: 4.2 G/DL (ref 3.4–5)
ALP SERPL-CCNC: 81 U/L (ref 40–150)
ALT SERPL W P-5'-P-CCNC: 31 U/L (ref 0–70)
ANION GAP SERPL CALCULATED.3IONS-SCNC: 8 MMOL/L (ref 3–14)
AST SERPL W P-5'-P-CCNC: 21 U/L (ref 0–45)
BILIRUB DIRECT SERPL-MCNC: 0.2 MG/DL (ref 0–0.2)
BILIRUB SERPL-MCNC: 0.8 MG/DL (ref 0.2–1.3)
BUN SERPL-MCNC: 11 MG/DL (ref 7–30)
CALCIUM SERPL-MCNC: 9.2 MG/DL (ref 8.5–10.1)
CHLORIDE SERPL-SCNC: 106 MMOL/L (ref 94–109)
CHOLEST SERPL-MCNC: 163 MG/DL
CO2 SERPL-SCNC: 22 MMOL/L (ref 20–32)
CREAT SERPL-MCNC: 0.78 MG/DL (ref 0.66–1.25)
GFR SERPL CREATININE-BSD FRML MDRD: >90 ML/MIN/{1.73_M2}
GLUCOSE SERPL-MCNC: 136 MG/DL (ref 70–99)
HDLC SERPL-MCNC: 50 MG/DL
LDLC SERPL CALC-MCNC: 71 MG/DL
NONHDLC SERPL-MCNC: 113 MG/DL
POTASSIUM SERPL-SCNC: 3.4 MMOL/L (ref 3.4–5.3)
PROT SERPL-MCNC: 7.2 G/DL (ref 6.8–8.8)
SODIUM SERPL-SCNC: 136 MMOL/L (ref 133–144)
TRIGL SERPL-MCNC: 208 MG/DL

## 2020-12-23 PROCEDURE — 80048 BASIC METABOLIC PNL TOTAL CA: CPT | Performed by: INTERNAL MEDICINE

## 2020-12-23 PROCEDURE — 80061 LIPID PANEL: CPT | Performed by: INTERNAL MEDICINE

## 2020-12-23 PROCEDURE — 36415 COLL VENOUS BLD VENIPUNCTURE: CPT | Performed by: INTERNAL MEDICINE

## 2020-12-23 PROCEDURE — 80076 HEPATIC FUNCTION PANEL: CPT | Performed by: INTERNAL MEDICINE

## 2020-12-31 DIAGNOSIS — R35.0 URINARY FREQUENCY: ICD-10-CM

## 2020-12-31 RX ORDER — LOSARTAN POTASSIUM 100 MG/1
100 TABLET ORAL DAILY
Qty: 90 TABLET | Refills: 2 | Status: SHIPPED | OUTPATIENT
Start: 2020-12-31 | End: 2022-01-12

## 2021-01-07 DIAGNOSIS — R35.0 URINARY FREQUENCY: ICD-10-CM

## 2021-01-07 RX ORDER — LOSARTAN POTASSIUM 100 MG/1
100 TABLET ORAL DAILY
Qty: 90 TABLET | Refills: 2 | OUTPATIENT
Start: 2021-01-07

## 2021-02-22 ENCOUNTER — TELEPHONE (OUTPATIENT)
Dept: INTERNAL MEDICINE | Facility: CLINIC | Age: 66
End: 2021-02-22

## 2021-02-22 NOTE — TELEPHONE ENCOUNTER
Diabetes Education Scheduling Outreach #1:    Call to patient to schedule. Left message with phone number to call to schedule.    Plan for 2nd outreach attempt within 1 week.    Jenniffer Buenrostro  [facility]  Diabetes and Nutrition Scheduling

## 2021-03-04 NOTE — TELEPHONE ENCOUNTER
Diabetes Education Scheduling Outreach #2:    Call to patient to schedule. Left message with phone number to call to schedule.    Jenniffer Buenrostro  [facility]  Diabetes and Nutrition Scheduling

## 2021-04-07 NOTE — PROGRESS NOTES
Diabetes Self Management Training: Initial Assessment Visit for Newly Diagnosed Patients (Complete AADE Goals Flowsheet)    Deven Hernandes presents today for education related to Type 2 diabetes.    He is accompanied by self    Patient's diabetes management related comments/concerns: bummed and surprised that he has received diabetes diagnosis     Patient's emotional response to diabetes: expresses readiness to learn and concern for health and well-being    Patient would like this visit to be focused around the following diabetes-related behaviors and goals: Diabetes pathophysiology, Assistance with making lifestyle changes, Self-care behavioral goal setting, Healthy Eating, Being Active and Monitoring    ASSESSMENT:  Patient Problem List and Family Medical History reviewed for relevant medical history, current medical status, and diabetes risk factors.    Current Diabetes Management per Patient:  Taking diabetes medications? no    Do you have any difficulty affording your medications or glucose monitoring supplies?    NA     *Abbreviated insulin dose documentation key: Insulin trade name ([id number]-bedtime) - i.e. Humalog 5-5-5-0 (Humalog 5 units at breakfast, 5 units at lunch, and 5 units at dinner).     Patient glucose self monitoring as follows: BG meter taught today.   BG meter: One Touch Ultra 2 meter  BG results: not available     BG values are: unable to assess  Patient's most recent   Lab Results   Component Value Date    A1C 6.3 07/09/2018    is meeting goal of <7.0    Nutrition:  Patient eats 3 meals per day. He hates eating breakfast but does try to eat a banana each morning. He is on an iron supplement that decreases his appetite significantly. He takes this in the morning and in the evening. He states evening sweets are his biggest challenge but he is working to cut these out.     Breakfast - banana, coffee with almond milk  Lunch - sandwich, cherries or grapes, water   Dinner - chicken  "or fish, vegetables, bread, cocktail - whiskey & gingerale   Snacks - rice pudding & bomb pop in the evenings    Beverages: water, coffee, almond milk, 1-2 cocktails/day    Cultural/Samaritan diet restrictions: No     Biggest Challenge to Healthy Eating: evening snacking    Physical Activity:    Type: stretching/working out at [employer]  Duration: 30 minutes  Frequency: 3-4 days/week    Diabetes Risk Factors:  family history, age over 45 years, hyperlipidemia and overweight/obesity    Relevant co-morbidities and related health problems:  Significant for:  none    Diabetes Complications:  Not discussed today.    Recent health service and resource utilization related to diabetes (hyperglycemia, hypoglycemia, etc):   None    Vitals:  There were no vitals taken for this visit.  Estimated body mass index is 27.71 kg/(m^2) as calculated from the following:    Height as of 6/25/18: 1.6 m (5' 3\").    Weight as of 7/9/18: 70.9 kg (156 lb 6.4 oz).   Last 3 BP:   BP Readings from Last 3 Encounters:   07/09/18 118/68   06/25/18 112/68   06/22/17 106/70       History   Smoking Status     Former Smoker     Packs/day: 0.25     Years: 20.00     Types: Cigarettes     Quit date: 11/17/2003   Smokeless Tobacco     Never Used       Labs:  Lab Results   Component Value Date    A1C 6.3 07/09/2018     Lab Results   Component Value Date     07/09/2018     Lab Results   Component Value Date     06/25/2018     HDL Cholesterol   Date Value Ref Range Status   06/25/2018 42 >39 mg/dL Final   ]  GFR Estimate   Date Value Ref Range Status   07/09/2018 88 >60 mL/min/1.7m2 Final     Comment:     Non  GFR Calc     GFR Estimate If Black   Date Value Ref Range Status   07/09/2018 >90 >60 mL/min/1.7m2 Final     Comment:      GFR Calc     Lab Results   Component Value Date    CR 0.87 07/09/2018     No results found for: MICROALBUMIN    Socio/Economic/Cultural Considerations:    Support system: not " "assessed    Cultural Influences/Ethnic Background:  American      Health Literacy/Numeracy:  \"With diabetes, it's helpful to use forms and log books to write down blood sugars and what you're eating at times to help understand how foods affect your blood sugars. With this in mind how confident are you at filling out medical forms, such as these, by yourself?  Not Assessed    Health Beliefs and Attitudes:   Patient Activation Measure Survey Score:  CECILIO Score (Last Two) 6/22/2017   CECILIO Raw Score 40   Activation Score 100   CECILIO Level 4       Stage of Change: ACTION (Actively working towards change)      Diabetes knowledge and skills assessment:     Patient is knowledgeable in diabetes management concepts related to: Healthy Eating and Being Active    Patient needs further education on the following diabetes management concepts: Healthy Eating, Being Active and Monitoring    Barriers to Learning Assessment: No Barriers identified    Based on learning assessment above, most appropriate setting for further diabetes education would be: Group class or Individual setting.    INTERVENTION:   Discussion - reviewed diabetes pathophysiology, strategies to control BGs, and need for increased monitoring. Patient is motivated to make change in his lifestyle to help control his diabetes. He works at [employer] on his feet most of the day which inhibits his ability to exercise some. He has already cut out sweets from his diet and does get a good amount of vegetables in his diet already. Discussed balanced eating, especially at AM meal - banana with peanut butter OR toast & peanut butter OR hardboiled egg, banana or toast. He feels this is doable.     Education provided today on:  AADE Self-Care Behaviors:  Healthy Eating: carbohydrate counting, consistency in amount, composition, and timing of food intake, portion control, plate planning method and label reading  Being Active: relationship to blood glucose, describe appropriate " activity program and precautions to take  Monitoring: purpose, proper technique, log and interpret results, individual blood glucose targets, frequency of monitoring, use of glucose control solution and proper sharps disposal  Patient was instructed on One Touch Ultra 2 meter and was able to provide an accurate return demonstration. Patient's blood glucose reading today was 212 mg/dL.    Opportunities for ongoing education and support in diabetes-self management were discussed.    Pt verbalized understanding of concepts discussed and recommendations provided today.       Education Materials Provided:  [place] Understanding Diabetes Booklet, BG Log Sheet and My Plate Planner    PLAN:  See Patient Instructions for co-developed, patient-stated behavior change goals.  AVS printed and provided to patient today.    FOLLOW-UP:  Follow-up appointment recommended in 3-4 weeks. Patient's work schedule varies so he plans to call to follow up.   Chart routed to referring provider.    Ongoing plan for education and support: Follow-up visit with diabetes educator in 3-4 weeks    Breanna Mendiola RD, LD   Time Spent: 45 minutes  Encounter Type: Individual    Any diabetes medication dose changes were made via the CDE Protocol and Collaborative Practice Agreement with the patient's referring provider. A copy of this encounter was shared with the provider.     slurred speech

## 2021-05-20 DIAGNOSIS — E78.5 HYPERLIPIDEMIA LDL GOAL <130: ICD-10-CM

## 2021-05-20 RX ORDER — ATORVASTATIN CALCIUM 10 MG/1
10 TABLET, FILM COATED ORAL DAILY
Qty: 90 TABLET | Refills: 1 | Status: SHIPPED | OUTPATIENT
Start: 2021-05-20 | End: 2021-12-16

## 2021-05-24 ENCOUNTER — TELEPHONE (OUTPATIENT)
Dept: INTERNAL MEDICINE | Facility: CLINIC | Age: 66
End: 2021-05-24

## 2021-05-24 DIAGNOSIS — N52.9 IMPOTENCE OF ORGANIC ORIGIN: ICD-10-CM

## 2021-05-25 RX ORDER — SILDENAFIL 100 MG/1
100 TABLET, FILM COATED ORAL DAILY PRN
Qty: 8 TABLET | Refills: 1 | Status: SHIPPED | OUTPATIENT
Start: 2021-05-25 | End: 2021-05-25

## 2021-05-25 RX ORDER — SILDENAFIL 100 MG/1
100 TABLET, FILM COATED ORAL DAILY PRN
Qty: 8 TABLET | Refills: 1 | Status: SHIPPED | OUTPATIENT
Start: 2021-05-25 | End: 2022-08-04

## 2021-05-25 NOTE — TELEPHONE ENCOUNTER
Appears pharmacy is out of [place] ([place] Left message for patient, would he like this RX mailed to home address or to ?

## 2021-05-25 NOTE — TELEPHONE ENCOUNTER
Sildenafil    Script needs to be printed, signed, and faxed to pharmacy.     pcp please print and sign.

## 2021-05-25 NOTE — TELEPHONE ENCOUNTER
Patient called back and would like the prescription mailed to his home address. Address in chart confirmed.

## 2021-09-12 ENCOUNTER — HEALTH MAINTENANCE LETTER (OUTPATIENT)
Age: 66
End: 2021-09-12

## 2021-12-14 DIAGNOSIS — Z12.5 SCREENING PSA (PROSTATE SPECIFIC ANTIGEN): Primary | ICD-10-CM

## 2021-12-14 DIAGNOSIS — E78.5 HYPERLIPIDEMIA LDL GOAL <130: ICD-10-CM

## 2021-12-16 RX ORDER — ATORVASTATIN CALCIUM 10 MG/1
10 TABLET, FILM COATED ORAL DAILY
Qty: 30 TABLET | Refills: 0 | Status: SHIPPED | OUTPATIENT
Start: 2021-12-16 | End: 2022-01-19

## 2021-12-16 NOTE — TELEPHONE ENCOUNTER
Called pt and notified of refill. Assisted with scheduling appt. Appt 1/24/22.     Please order labs. Pt will come to visit fasting .    Whit Cox RN

## 2021-12-16 NOTE — TELEPHONE ENCOUNTER
I have filled for 30 days but patient needs to be informed that is due for clinic visit and fasting lab work prior to next refill

## 2021-12-16 NOTE — TELEPHONE ENCOUNTER
Routing refill request to provider for review/approval because:  Patient needs to be seen because it has been more than 1 year since last office visit.  Whit Cox RN

## 2022-01-02 ENCOUNTER — HEALTH MAINTENANCE LETTER (OUTPATIENT)
Age: 67
End: 2022-01-02

## 2022-01-09 DIAGNOSIS — I10 ESSENTIAL HYPERTENSION, BENIGN: ICD-10-CM

## 2022-01-10 DIAGNOSIS — R35.0 URINARY FREQUENCY: ICD-10-CM

## 2022-01-12 RX ORDER — LOSARTAN POTASSIUM 100 MG/1
100 TABLET ORAL DAILY
Qty: 90 TABLET | Refills: 0 | Status: SHIPPED | OUTPATIENT
Start: 2022-01-12 | End: 2022-01-24

## 2022-01-12 RX ORDER — AMLODIPINE BESYLATE 10 MG/1
10 TABLET ORAL DAILY
Qty: 90 TABLET | Refills: 3 | Status: SHIPPED | OUTPATIENT
Start: 2022-01-12 | End: 2022-01-24

## 2022-01-12 NOTE — CONFIDENTIAL NOTE
Routing refill request to provider for review/approval because:  Drug interaction warning pt has appt 1.24.22

## 2022-01-12 NOTE — TELEPHONE ENCOUNTER
Medication is being filled for 1 time refill only due to:  Patient needs labs and due for visit. . BPs and labs within parameters but out of date. Pt has future appt:    Appointments in Next Year    Jan 24, 2022  7:00 AM  (Arrive by 6:45 AM)  Provider Visit with Garrett Clifford MD  [facility] ([facility] ) [phone]          Marycruz Burton RN

## 2022-01-19 DIAGNOSIS — E78.5 HYPERLIPIDEMIA LDL GOAL <130: ICD-10-CM

## 2022-01-19 RX ORDER — ATORVASTATIN CALCIUM 10 MG/1
TABLET, FILM COATED ORAL
Qty: 30 TABLET | Refills: 0 | Status: SHIPPED | OUTPATIENT
Start: 2022-01-19 | End: 2022-01-24

## 2022-01-19 NOTE — TELEPHONE ENCOUNTER
Please note I have filled prescription for 30 days but patient is overdue to get blood work done.  No further refills will be granted unless lab work is performed.

## 2022-01-19 NOTE — LETTER
[facility]  [address] [address]  [phone]            Deven Hernandes  [address] [address]        January 20, 2022    Dear Morgan,    While refilling your atorvastatin (LIPITOR) 10 MG tablet prescription today, we noticed that you are overdue for fasting lab work.  We will refill your prescription for 30 days, but a follow-up lab only appointment must be completed before any additional refills can be approved.     Taking care of your health is important to us and we look forward to seeing you in the near future.  Please call us at [phone] or [phone] (or use [employer]) to schedule an appointment.     Please disregard this notice if you have already made an appointment.    Sincerely,        [facility]

## 2022-01-19 NOTE — TELEPHONE ENCOUNTER
Routing refill request to provider for review/approval because:  Labs not current:    LDL Cholesterol Calculated   Date Value Ref Range Status   12/23/2020 71 <100 mg/dL Final     Comment:     Desirable:       <100 mg/dl     Pt does have an upcoming appt to see you 1/24/22  Whit Cox RN

## 2022-01-20 RX ORDER — ATORVASTATIN CALCIUM 10 MG/1
10 TABLET, FILM COATED ORAL DAILY
Qty: 30 TABLET | Refills: 0 | OUTPATIENT
Start: 2022-01-20

## 2022-01-20 NOTE — TELEPHONE ENCOUNTER
This refill request is a duplicate previously sent to pharmacy or currently in review.  Refused medication to pharmacy as duplicate.   Caitlin Chu RN

## 2022-01-24 ENCOUNTER — OFFICE VISIT (OUTPATIENT)
Dept: INTERNAL MEDICINE | Facility: CLINIC | Age: 67
End: 2022-01-24
Payer: COMMERCIAL

## 2022-01-24 VITALS
DIASTOLIC BLOOD PRESSURE: 78 MMHG | WEIGHT: 156 LBS | BODY MASS INDEX: 27.64 KG/M2 | RESPIRATION RATE: 16 BRPM | TEMPERATURE: 97.2 F | HEART RATE: 91 BPM | OXYGEN SATURATION: 99 % | HEIGHT: 63 IN | SYSTOLIC BLOOD PRESSURE: 124 MMHG

## 2022-01-24 DIAGNOSIS — E78.5 HYPERLIPIDEMIA LDL GOAL <130: ICD-10-CM

## 2022-01-24 DIAGNOSIS — I10 ESSENTIAL HYPERTENSION, BENIGN: Primary | ICD-10-CM

## 2022-01-24 DIAGNOSIS — E66.01 MORBID OBESITY (H): ICD-10-CM

## 2022-01-24 DIAGNOSIS — R97.20 ELEVATED PROSTATE SPECIFIC ANTIGEN (PSA): ICD-10-CM

## 2022-01-24 DIAGNOSIS — Z12.5 SCREENING PSA (PROSTATE SPECIFIC ANTIGEN): ICD-10-CM

## 2022-01-24 DIAGNOSIS — R73.9 HYPERGLYCEMIA: ICD-10-CM

## 2022-01-24 LAB
ERYTHROCYTE [DISTWIDTH] IN BLOOD BY AUTOMATED COUNT: 12.4 % (ref 10–15)
HCT VFR BLD AUTO: 43.8 % (ref 40–53)
HGB BLD-MCNC: 15 G/DL (ref 13.3–17.7)
MCH RBC QN AUTO: 33.6 PG (ref 26.5–33)
MCHC RBC AUTO-ENTMCNC: 34.2 G/DL (ref 31.5–36.5)
MCV RBC AUTO: 98 FL (ref 78–100)
PLATELET # BLD AUTO: 325 10E3/UL (ref 150–450)
RBC # BLD AUTO: 4.47 10E6/UL (ref 4.4–5.9)
WBC # BLD AUTO: 8.3 10E3/UL (ref 4–11)

## 2022-01-24 PROCEDURE — 80061 LIPID PANEL: CPT | Performed by: INTERNAL MEDICINE

## 2022-01-24 PROCEDURE — 36415 COLL VENOUS BLD VENIPUNCTURE: CPT | Performed by: INTERNAL MEDICINE

## 2022-01-24 PROCEDURE — 85027 COMPLETE CBC AUTOMATED: CPT | Performed by: INTERNAL MEDICINE

## 2022-01-24 PROCEDURE — G0103 PSA SCREENING: HCPCS | Performed by: INTERNAL MEDICINE

## 2022-01-24 PROCEDURE — 99214 OFFICE O/P EST MOD 30 MIN: CPT | Performed by: INTERNAL MEDICINE

## 2022-01-24 PROCEDURE — 83036 HEMOGLOBIN GLYCOSYLATED A1C: CPT | Performed by: INTERNAL MEDICINE

## 2022-01-24 PROCEDURE — 80053 COMPREHEN METABOLIC PANEL: CPT | Performed by: INTERNAL MEDICINE

## 2022-01-24 RX ORDER — ATORVASTATIN CALCIUM 10 MG/1
10 TABLET, FILM COATED ORAL DAILY
Qty: 90 TABLET | Refills: 1 | Status: SHIPPED | OUTPATIENT
Start: 2022-01-24 | End: 2022-07-21

## 2022-01-24 RX ORDER — AMLODIPINE BESYLATE 10 MG/1
10 TABLET ORAL DAILY
Qty: 90 TABLET | Refills: 3 | Status: SHIPPED | OUTPATIENT
Start: 2022-01-24 | End: 2023-04-05

## 2022-01-24 RX ORDER — LOSARTAN POTASSIUM 100 MG/1
100 TABLET ORAL DAILY
Qty: 90 TABLET | Refills: 1 | Status: SHIPPED | OUTPATIENT
Start: 2022-01-24 | End: 2022-04-13

## 2022-01-24 ASSESSMENT — MIFFLIN-ST. JEOR: SCORE: 1377.74

## 2022-01-24 NOTE — PROGRESS NOTES
"  Assessment & Plan     Essential hypertension, benign  Stable on therapy, continue with current medical management.  Recheck blood pressure 6  - amLODIPine (NORVASC) 10 MG tablet; Take 1 tablet (10 mg) by mouth daily  - losartan (COZAAR) 100 MG tablet; Take 1 tablet (100 mg) by mouth daily    Hyperlipidemia LDL goal <130  Labs ordered as fasting per routine.  Continue with statin therapy  - atorvastatin (LIPITOR) 10 MG tablet; Take 1 tablet (10 mg) by mouth daily  - CBC with platelets  - Lipid Profile  - Comprehensive metabolic panel    Screening PSA (prostate specific antigen)  Ordered as routine screening based on age as long as patient getting lab work  - Prostate Specific Antigen Screen    Elevated prostate specific antigen (PSA)  Discussed slightly elevated PSA.  He would like to consider seeing urology as has had mild issues of erectile dysfunction  - Adult Urology Referral; Future    Obesity (BMI 35.0-39.9) with comorbidity (H)  Encouraged ongoing weight loss per height.      Advise updated shingles shot, tetanus booster and discussed low-dose screening CT scan of chest pending patient's interest and insurance coverage.  Of note patient quit smoking roughly 19 years ago     BMI:   Estimated body mass index is 27.63 kg/m  as calculated from the following:    Height as of this encounter: 1.6 m (5' 3\").    Weight as of this encounter: 70.8 kg (156 lb).   Weight management plan: Discussed healthy diet and exercise guidelines    Work on weight loss  Regular exercise    Return in about 6 months (around 7/24/2022) for BP Recheck.    Garrett Clifford MD  [facility]LLAN Mora is a 66 year old who presents for the following health issues  accompanied by his sself.    History of Present Illness       Hypertension: He presents for follow up of hypertension.  He does check blood pressure  regularly outside of the clinic. Outpatient blood pressures have not been over 140/90. He " "follows a low salt diet.         Review of Systems   CONSTITUTIONAL: NEGATIVE for fever, chills, change in weight  EYES: NEGATIVE for vision changes or irritation  ENT/MOUTH: NEGATIVE for ear, mouth and throat problems  RESP: NEGATIVE for significant cough or SOB  CV: NEGATIVE for chest pain, palpitations or peripheral edema  GI: NEGATIVE for nausea, abdominal pain, heartburn, or change in bowel habits  : NEGATIVE for frequency, dysuria, or hematuria, we did discuss his slightly elevated PSA and some mild issues with erectile dysfunction.  He would like to consider potentially seeing a urologist.  MUSCULOSKELETAL: NEGATIVE for significant arthralgias or myalgia  NEURO: NEGATIVE for weakness, dizziness or paresthesias  HEME: NEGATIVE for bleeding problems  PSYCHIATRIC: NEGATIVE for changes in mood or affect    Current Outpatient Medications   Medication     amLODIPine (NORVASC) 10 MG tablet     ASPIRIN 81 MG OR TABS     atorvastatin (LIPITOR) 10 MG tablet     blood glucose (NO BRAND SPECIFIED) lancets standard     blood glucose calibration (NO BRAND SPECIFIED) solution     blood glucose monitoring (NO BRAND SPECIFIED) meter device kit     blood glucose monitoring (NO BRAND SPECIFIED) test strip     losartan (COZAAR) 100 MG tablet     METAMUCIL 30.9 % OR POWD     MULTIVITAMIN CHEW   OR     omeprazole (PRILOSEC) 20 MG CR capsule     sildenafil (VIAGRA) 100 MG tablet     No current facility-administered medications for this visit.           Objective    /78   Pulse 91   Temp 97.2  F (36.2  C) (Temporal)   Resp 16   Ht 1.6 m (5' 3\")   Wt 70.8 kg (156 lb)   SpO2 99%   BMI 27.63 kg/m    Body mass index is 27.63 kg/m .  Physical Exam   GENERAL: healthy, alert and no distress  EYES: Eyes grossly normal to inspection, PERRL and conjunctivae and sclerae normal  HENT: ear canals and TM's normal, nose and mouth without ulcers or lesions  NECK: no adenopathy, no asymmetry, masses, or scars and thyroid normal to " palpation  RESP: lungs clear to auscultation - no rales, rhonchi or wheezes  CV: regular rate and rhythm, normal S1 S2, no S3 or S4, no murmur, click or rub, no peripheral edema and peripheral pulses strong  MS: no gross musculoskeletal defects noted, no edema  NEURO: Normal strength and tone, mentation intact and speech normal  PSYCH: mentation appears normal, affect normal/bright

## 2022-01-25 LAB
ALBUMIN SERPL-MCNC: 4.2 G/DL (ref 3.4–5)
ALP SERPL-CCNC: 77 U/L (ref 40–150)
ALT SERPL W P-5'-P-CCNC: 27 U/L (ref 0–70)
ANION GAP SERPL CALCULATED.3IONS-SCNC: 8 MMOL/L (ref 3–14)
AST SERPL W P-5'-P-CCNC: 13 U/L (ref 0–45)
BILIRUB SERPL-MCNC: 0.9 MG/DL (ref 0.2–1.3)
BUN SERPL-MCNC: 12 MG/DL (ref 7–30)
CALCIUM SERPL-MCNC: 9.6 MG/DL (ref 8.5–10.1)
CHLORIDE BLD-SCNC: 104 MMOL/L (ref 94–109)
CHOLEST SERPL-MCNC: 194 MG/DL
CO2 SERPL-SCNC: 26 MMOL/L (ref 20–32)
CREAT SERPL-MCNC: 0.81 MG/DL (ref 0.66–1.25)
FASTING STATUS PATIENT QL REPORTED: YES
GFR SERPL CREATININE-BSD FRML MDRD: >90 ML/MIN/1.73M2
GLUCOSE BLD-MCNC: 156 MG/DL (ref 70–99)
HBA1C MFR BLD: 6 % (ref 0–5.6)
HDLC SERPL-MCNC: 55 MG/DL
LDLC SERPL CALC-MCNC: 83 MG/DL
NONHDLC SERPL-MCNC: 139 MG/DL
POTASSIUM BLD-SCNC: 3.2 MMOL/L (ref 3.4–5.3)
PROT SERPL-MCNC: 7.3 G/DL (ref 6.8–8.8)
PSA SERPL-MCNC: 5.55 UG/L (ref 0–4)
SODIUM SERPL-SCNC: 138 MMOL/L (ref 133–144)
TRIGL SERPL-MCNC: 278 MG/DL

## 2022-02-11 ENCOUNTER — MYC MEDICAL ADVICE (OUTPATIENT)
Dept: INTERNAL MEDICINE | Facility: CLINIC | Age: 67
End: 2022-02-11

## 2022-02-11 ENCOUNTER — LAB (OUTPATIENT)
Dept: LAB | Facility: CLINIC | Age: 67
End: 2022-02-11
Payer: COMMERCIAL

## 2022-02-11 DIAGNOSIS — I10 ESSENTIAL HYPERTENSION, BENIGN: ICD-10-CM

## 2022-02-11 LAB — POTASSIUM BLD-SCNC: 3.1 MMOL/L (ref 3.4–5.3)

## 2022-02-11 PROCEDURE — 84132 ASSAY OF SERUM POTASSIUM: CPT

## 2022-02-11 PROCEDURE — 36415 COLL VENOUS BLD VENIPUNCTURE: CPT

## 2022-03-10 ENCOUNTER — OFFICE VISIT (OUTPATIENT)
Dept: UROLOGY | Facility: CLINIC | Age: 67
End: 2022-03-10
Attending: INTERNAL MEDICINE
Payer: COMMERCIAL

## 2022-03-10 VITALS
HEIGHT: 63 IN | SYSTOLIC BLOOD PRESSURE: 126 MMHG | WEIGHT: 155 LBS | BODY MASS INDEX: 27.46 KG/M2 | DIASTOLIC BLOOD PRESSURE: 70 MMHG

## 2022-03-10 DIAGNOSIS — R97.20 ELEVATED PROSTATE SPECIFIC ANTIGEN (PSA): Primary | ICD-10-CM

## 2022-03-10 DIAGNOSIS — N52.01 ERECTILE DYSFUNCTION DUE TO ARTERIAL INSUFFICIENCY: ICD-10-CM

## 2022-03-10 LAB — PSA SERPL-MCNC: 5.9 UG/L (ref 0–4)

## 2022-03-10 PROCEDURE — 84153 ASSAY OF PSA TOTAL: CPT | Performed by: UROLOGY

## 2022-03-10 PROCEDURE — 36415 COLL VENOUS BLD VENIPUNCTURE: CPT | Performed by: UROLOGY

## 2022-03-10 PROCEDURE — 99204 OFFICE O/P NEW MOD 45 MIN: CPT | Performed by: UROLOGY

## 2022-03-10 RX ORDER — SILDENAFIL 100 MG/1
100 TABLET, FILM COATED ORAL DAILY PRN
Qty: 30 TABLET | Refills: 3 | Status: SHIPPED | OUTPATIENT
Start: 2022-03-10 | End: 2023-05-08

## 2022-03-10 ASSESSMENT — PAIN SCALES - GENERAL: PAINLEVEL: NO PAIN (0)

## 2022-03-10 NOTE — LETTER
3/10/2022     RE: Deven Hernandes  [address] [address]     Dear Colleague,    Thank you for referring your patient, Deven Hernandes, to the [facility] [place] at [facility]. Please see a copy of my visit note below.          Chief Complaint:    Elevated PSA (Prostate Specific Antigen)         Consult or Referral:     Deven Hernandes is a 66 year old male seen at the request of Dr. Clifford.         History of Present Illness:    Deven Hernandes is a very pleasant 66 year old male who presents with a history of Elevated PSA (Prostate Specific Antigen). He was noted at annual physical to have PSA to 5.55. Had not previously been elevated. No prior workup. He reports no significant urinary symptoms. No hematuria or dysuria.  No family history of prostate cancer.  He reports increasing difficulty maintaining erections and he is interested trying sildenafil.    PSA  3/10/2022 - 5.90  1/24/2022 - 5.55  11/4/2020 - 4.05  10/22/2019 - 3.72         Past Medical History:     Past Medical History:   Diagnosis Date     Esophageal reflux      Essential hypertension, benign      Hyperglycemia      Hyperlipidemia      Tobacco use disorder     dced 2003          Past Surgical History:     Past Surgical History:   Procedure Laterality Date     COLONOSCOPY  4-19-17     ESOPHAGOSCOPY, GASTROSCOPY, DUODENOSCOPY (EGD), COMBINED N/A 7/31/2018    Procedure: COMBINED ESOPHAGOSCOPY, GASTROSCOPY, DUODENOSCOPY (EGD), BIOPSY SINGLE OR MULTIPLE;  gastroscopy;  Surgeon: Stephen Skelton MD;  Location:  GI     TONSILLECTOMY      as a child     ZZ NONSPECIFIC PROCEDURE      tonsillectomy     Z NONSPECIFIC PROCEDURE  2001    nl colonoscopy          Medications     Current Outpatient Medications   Medication     amLODIPine (NORVASC) 10 MG tablet     atorvastatin (LIPITOR) 10 MG tablet     blood glucose (NO BRAND SPECIFIED) lancets standard      losartan (COZAAR) 100 MG tablet     METAMUCIL 30.9 % OR POWD     MULTIVITAMIN CHEW   OR     omeprazole (PRILOSEC) 20 MG CR capsule     sildenafil (VIAGRA) 100 MG tablet     ASPIRIN 81 MG OR TABS     blood glucose calibration (NO BRAND SPECIFIED) solution     blood glucose monitoring (NO BRAND SPECIFIED) meter device kit     blood glucose monitoring (NO BRAND SPECIFIED) test strip     No current facility-administered medications for this visit.          Family History:     Family History   Problem Relation Age of Onset     Respiratory Mother         b:1926   emphysema, ?heart problems, HTN     Hypertension Mother      Cerebrovascular Disease Mother      Cancer Father         d:age 82   colon cancer     Colon Cancer Father      Arthritis Sister         b:   unsure of type     Family History Negative Brother         b:     Diabetes Brother      Diabetes Cousin      Hypertension Brother           Social History:     Social History     Socioeconomic History     Marital status:      Spouse name: Not on file     Number of children: Not on file     Years of education: Not on file     Highest education level: Not on file   Occupational History     Not on file   Tobacco Use     Smoking status: Former Smoker     Packs/day: 0.25     Years: 20.00     Pack years: 5.00     Types: Cigarettes     Quit date: 2003     Years since quittin.3     Smokeless tobacco: Never Used   Substance and Sexual Activity     Alcohol use: Yes     Comment: socially     Drug use: No     Sexual activity: Yes     Partners: Female   Other Topics Concern     Parent/sibling w/ CABG, MI or angioplasty before 65F 55M? No   Social History Narrative     Not on file     Social Determinants of Health     Financial Resource Strain: Not on file   Food Insecurity: Not on file   Transportation Needs: Not on file   Physical Activity: Not on file   Stress: Not on file   Social Connections: Not on file   Intimate Partner Violence: Not on file  "  Housing Stability: Not on file          Allergies:   Amoxicillin, Codeine, Lansoprazole, Lisinopril, Niacin, Penicillins, and Verapamil         Review of Systems:  From intake questionnaire     Skin: negative  Eyes: negative  Ears/Nose/Throat: negative  Respiratory: No shortness of breath, dyspnea on exertion, cough, or hemoptysis  Cardiovascular: No chest pain or palpitations  Gastrointestinal: negative; no nausea/vomiting, constipation or diarrhea  Genitourinary: as per HPI  Musculoskeletal: negative  Neurologic: negative  Psychiatric: negative  Hematologic/Lymphatic/Immunologic: negative  Endocrine: negative         Physical Exam:     Patient is a 66 year old  male   Vitals: Blood pressure 126/70, height 1.6 m (5' 3\"), weight 70.3 kg (155 lb).  Constitutional: Body mass index is 27.46 kg/m .  Alert, no acute distress, oriented, conversant  Eyes: no scleral icterus; extraocular muscles intact, moist conjunctivae  Respiratory: no respiratory distress, or pursed lip breathing  Cardiovascular: pulses strong and intact; no obvious jugular venous distension present  Gastrointestinal: soft, nontender, no organomegaly or masses,   Musculoskeletal: extremities normal, no peripheral edema  Skin: no suspicious lesions or rashes  Neuro: Alert, oriented, speech and mentation normal  Psych: affect and mood normal, alert and oriented to person, place and time      Labs and Pathology:    I reviewed all applicable laboratory and pathology data and went over findings with patient  Significant for   Lab Results   Component Value Date    CR 0.81 01/24/2022    CR 0.78 12/23/2020    CR 0.97 11/23/2020    CR 0.83 11/04/2020    CR 1.00 10/22/2019    CR 0.87 07/09/2018    CR 0.89 06/25/2018    CR 0.99 06/22/2017    CR 0.77 06/14/2016    CR 0.90 04/28/2015    CR 0.88 10/15/2014     PSA   Date Value Ref Range Status   11/04/2020 4.05 (H) 0 - 4 ug/L Final     Comment:     Assay Method:  Chemiluminescence using Siemens Vista analyzer "   10/22/2019 3.72 0 - 4 ug/L Final     Comment:     Assay Method:  Chemiluminescence using Siemens Vista analyzer   07/16/2018 3.45 0 - 4 ug/L Final     Comment:     Assay Method:  Chemiluminescence using Siemens Vista analyzer   06/25/2018 4.08 (H) 0 - 4 ug/L Final     Comment:     Assay Method:  Chemiluminescence using Siemens Vista analyzer   06/22/2017 4.61 (H) 0 - 4 ug/L Final     Comment:     Assay Method:  Chemiluminescence using Siemens Vista analyzer   06/14/2016 3.70 0 - 4 ug/L Final   04/28/2015 3.68 0 - 4 ug/L Final   10/15/2014 2.68 0 - 4 ug/L Final   09/23/2010 1.45 0 - 4 ug/L Final     Prostate Specific Antigen Screen   Date Value Ref Range Status   01/24/2022 5.55 (H) 0.00 - 4.00 ug/L Final       Outside and Past Medical records:    Review of the result(s) of each unique test - PSA, creat         Assessment and Plan:     Assessment: 66 year old male with elevated PSA to 5.55, recheck to 5.9 today. We had a long discussion about the utility of PSA screening.  We talked about the Pros and Cons.  We discussed the findings of the PLCO and EORTC studies.  We discussed the results of the [place] trial of treatment vs. observation in clinically detected prostate cancer as well as the results of the PIVOT trial in the context of screen-detected cancer.  We discussed the recommendations by the AUA, and USPSTF. We also discussed the significant (2-6%) and rising risk of biopsy associated sepsis.    We also discussed the emerging role of MRI in the diagnosis of prostate cancer and the potential for performing MRI-Ultrasound Fusion biopsy if suspicious lesions are noted. Will plan to proceed with MRI.    We also discussed his erectile dysfunction and options. He would like trial of sildenafil and risks/side effects were discussed today.     Plan:  Sildenafil prescribed today  PSA today  MRI prostate - will call with results and discuss biopsy pending findings    Orders  Orders Placed This Encounter    Procedures     MR Prostate wo & w Contrast     PSA tumor marker     Phu Conteh MD  Urology  [facility]

## 2022-03-10 NOTE — PROGRESS NOTES
Chief Complaint:    Elevated PSA (Prostate Specific Antigen)         Consult or Referral:     Deven Hernandes is a 66 year old male seen at the request of Dr. Clifford.         History of Present Illness:    Deven Hernandes is a very pleasant 66 year old male who presents with a history of Elevated PSA (Prostate Specific Antigen). He was noted at annual physical to have PSA to 5.55. Had not previously been elevated. No prior workup. He reports no significant urinary symptoms. No hematuria or dysuria.  No family history of prostate cancer.  He reports increasing difficulty maintaining erections and he is interested trying sildenafil.    PSA  3/10/2022 - 5.90  1/24/2022 - 5.55  11/4/2020 - 4.05  10/22/2019 - 3.72         Past Medical History:     Past Medical History:   Diagnosis Date     Esophageal reflux      Essential hypertension, benign      Hyperglycemia      Hyperlipidemia      Tobacco use disorder     dced 2003          Past Surgical History:     Past Surgical History:   Procedure Laterality Date     COLONOSCOPY  4-19-17     ESOPHAGOSCOPY, GASTROSCOPY, DUODENOSCOPY (EGD), COMBINED N/A 7/31/2018    Procedure: COMBINED ESOPHAGOSCOPY, GASTROSCOPY, DUODENOSCOPY (EGD), BIOPSY SINGLE OR MULTIPLE;  gastroscopy;  Surgeon: Stephen Skelton MD;  Location:  GI     TONSILLECTOMY      as a child     [facility] NONSPECIFIC PROCEDURE      tonsillectomy     [facility] NONSPECIFIC PROCEDURE  2001    nl colonoscopy          Medications     Current Outpatient Medications   Medication     amLODIPine (NORVASC) 10 MG tablet     atorvastatin (LIPITOR) 10 MG tablet     blood glucose (NO BRAND SPECIFIED) lancets standard     losartan (COZAAR) 100 MG tablet     METAMUCIL 30.9 % OR POWD     MULTIVITAMIN CHEW   OR     omeprazole (PRILOSEC) 20 MG CR capsule     sildenafil (VIAGRA) 100 MG tablet     ASPIRIN 81 MG OR TABS     blood glucose calibration (NO BRAND SPECIFIED) solution     blood glucose monitoring (NO BRAND SPECIFIED) meter device  kit     blood glucose monitoring (NO BRAND SPECIFIED) test strip     No current facility-administered medications for this visit.          Family History:     Family History   Problem Relation Age of Onset     Respiratory Mother         b:1926   emphysema, ?heart problems, HTN     Hypertension Mother      Cerebrovascular Disease Mother      Cancer Father         d:age 82   colon cancer     Colon Cancer Father      Arthritis Sister         b:   unsure of type     Family History Negative Brother         b:     Diabetes Brother      Diabetes Cousin      Hypertension Brother           Social History:     Social History     Socioeconomic History     Marital status:      Spouse name: Not on file     Number of children: Not on file     Years of education: Not on file     Highest education level: Not on file   Occupational History     Not on file   Tobacco Use     Smoking status: Former Smoker     Packs/day: 0.25     Years: 20.00     Pack years: 5.00     Types: Cigarettes     Quit date: 2003     Years since quittin.3     Smokeless tobacco: Never Used   Substance and Sexual Activity     Alcohol use: Yes     Comment: socially     Drug use: No     Sexual activity: Yes     Partners: Female   Other Topics Concern     Parent/sibling w/ CABG, MI or angioplasty before 65F 55M? No   Social History Narrative     Not on file     Social Determinants of Health     Financial Resource Strain: Not on file   Food Insecurity: Not on file   Transportation Needs: Not on file   Physical Activity: Not on file   Stress: Not on file   Social Connections: Not on file   Intimate Partner Violence: Not on file   Housing Stability: Not on file          Allergies:   Amoxicillin, Codeine, Lansoprazole, Lisinopril, Niacin, Penicillins, and Verapamil         Review of Systems:  From intake questionnaire     Skin: negative  Eyes: negative  Ears/Nose/Throat: negative  Respiratory: No shortness of breath, dyspnea on exertion, cough,  "or hemoptysis  Cardiovascular: No chest pain or palpitations  Gastrointestinal: negative; no nausea/vomiting, constipation or diarrhea  Genitourinary: as per HPI  Musculoskeletal: negative  Neurologic: negative  Psychiatric: negative  Hematologic/Lymphatic/Immunologic: negative  Endocrine: negative         Physical Exam:     Patient is a 66 year old  male   Vitals: Blood pressure 126/70, height 1.6 m (5' 3\"), weight 70.3 kg (155 lb).  Constitutional: Body mass index is 27.46 kg/m .  Alert, no acute distress, oriented, conversant  Eyes: no scleral icterus; extraocular muscles intact, moist conjunctivae  Respiratory: no respiratory distress, or pursed lip breathing  Cardiovascular: pulses strong and intact; no obvious jugular venous distension present  Gastrointestinal: soft, nontender, no organomegaly or masses,   Musculoskeletal: extremities normal, no peripheral edema  Skin: no suspicious lesions or rashes  Neuro: Alert, oriented, speech and mentation normal  Psych: affect and mood normal, alert and oriented to person, place and time      Labs and Pathology:    I reviewed all applicable laboratory and pathology data and went over findings with patient  Significant for   Lab Results   Component Value Date    CR 0.81 01/24/2022    CR 0.78 12/23/2020    CR 0.97 11/23/2020    CR 0.83 11/04/2020    CR 1.00 10/22/2019    CR 0.87 07/09/2018    CR 0.89 06/25/2018    CR 0.99 06/22/2017    CR 0.77 06/14/2016    CR 0.90 04/28/2015    CR 0.88 10/15/2014     PSA   Date Value Ref Range Status   11/04/2020 4.05 (H) 0 - 4 ug/L Final     Comment:     Assay Method:  Chemiluminescence using Siemens Vista analyzer   10/22/2019 3.72 0 - 4 ug/L Final     Comment:     Assay Method:  Chemiluminescence using Siemens Vista analyzer   07/16/2018 3.45 0 - 4 ug/L Final     Comment:     Assay Method:  Chemiluminescence using Siemens Vista analyzer   06/25/2018 4.08 (H) 0 - 4 ug/L Final     Comment:     Assay Method:  Chemiluminescence using " Siemens [place] analyzer   06/22/2017 4.61 (H) 0 - 4 ug/L Final     Comment:     Assay Method:  Chemiluminescence using Siemens Vista analyzer   06/14/2016 3.70 0 - 4 ug/L Final   04/28/2015 3.68 0 - 4 ug/L Final   10/15/2014 2.68 0 - 4 ug/L Final   09/23/2010 1.45 0 - 4 ug/L Final     Prostate Specific Antigen Screen   Date Value Ref Range Status   01/24/2022 5.55 (H) 0.00 - 4.00 ug/L Final       Outside and Past Medical records:    Review of the result(s) of each unique test - PSA, creat         Assessment and Plan:     Assessment: 66 year old male with elevated PSA to 5.55, recheck to 5.9 today. We had a long discussion about the utility of PSA screening.  We talked about the Pros and Cons.  We discussed the findings of the PLCO and EORTC studies.  We discussed the results of the [place] trial of treatment vs. observation in clinically detected prostate cancer as well as the results of the PIVOT trial in the context of screen-detected cancer.  We discussed the recommendations by the AUA, and USPSTF. We also discussed the significant (2-6%) and rising risk of biopsy associated sepsis.    We also discussed the emerging role of MRI in the diagnosis of prostate cancer and the potential for performing MRI-Ultrasound Fusion biopsy if suspicious lesions are noted. Will plan to proceed with MRI.    We also discussed his erectile dysfunction and options. He would like trial of sildenafil and risks/side effects were discussed today.     Plan:  Sildenafil prescribed today  PSA today  MRI prostate - will call with results and discuss biopsy pending findings    Orders  Orders Placed This Encounter   Procedures     MR Prostate wo & w Contrast     PSA tumor marker     Phu Conteh MD  Urology  [facility]

## 2022-03-12 PROBLEM — R97.20 ELEVATED PROSTATE SPECIFIC ANTIGEN (PSA): Status: ACTIVE | Noted: 2022-03-12

## 2022-03-12 PROBLEM — N52.01 ERECTILE DYSFUNCTION DUE TO ARTERIAL INSUFFICIENCY: Status: ACTIVE | Noted: 2022-03-12

## 2022-03-14 ENCOUNTER — TELEPHONE (OUTPATIENT)
Dept: UROLOGY | Facility: CLINIC | Age: 67
End: 2022-03-14
Payer: COMMERCIAL

## 2022-03-14 NOTE — TELEPHONE ENCOUNTER
[facility] Call Center    Phone Message    May a detailed message be left on voicemail: yes     Reason for Call: Patient wanting to get a copy of referral for MRI mailed to his address. Patient concerned about [employer] having issues with MRI. Thank you    Action Taken: Message routed to:  [facility] ([facility] Uro    Travel Screening: Not Applicable

## 2022-03-31 ENCOUNTER — HOSPITAL ENCOUNTER (OUTPATIENT)
Dept: MRI IMAGING | Facility: CLINIC | Age: 67
Discharge: HOME | End: 2022-03-31
Attending: UROLOGY | Admitting: UROLOGY
Payer: COMMERCIAL

## 2022-03-31 DIAGNOSIS — R97.20 ELEVATED PROSTATE SPECIFIC ANTIGEN (PSA): ICD-10-CM

## 2022-03-31 PROCEDURE — A9585 GADOBUTROL INJECTION: HCPCS | Performed by: UROLOGY

## 2022-03-31 PROCEDURE — 72197 MRI PELVIS W/O & W/DYE: CPT | Mod: 26 | Performed by: RADIOLOGY

## 2022-03-31 PROCEDURE — 255N000002 HC RX 255 OP 636: Performed by: UROLOGY

## 2022-03-31 PROCEDURE — 72197 MRI PELVIS W/O & W/DYE: CPT

## 2022-03-31 RX ORDER — GADOBUTROL 604.72 MG/ML
7 INJECTION INTRAVENOUS ONCE
Status: COMPLETED | OUTPATIENT
Start: 2022-03-31 | End: 2022-03-31

## 2022-03-31 RX ADMIN — GADOBUTROL 7 ML: 604.72 INJECTION INTRAVENOUS at 08:51

## 2022-04-05 ENCOUNTER — TELEPHONE (OUTPATIENT)
Dept: UROLOGY | Facility: CLINIC | Age: 67
End: 2022-04-05
Payer: COMMERCIAL

## 2022-04-05 DIAGNOSIS — R97.20 ELEVATED PROSTATE SPECIFIC ANTIGEN (PSA): Primary | ICD-10-CM

## 2022-04-05 NOTE — TELEPHONE ENCOUNTER
----- Message from Phu Conteh MD sent at 4/4/2022  2:44 PM CDT -----    Please Call Morgan and let him know his MRI did not demonstrate any suspicious lesions. As this does not completely rule out prostate cancer, some patients elect to proceed with prostate biopsy in an attempt to rule out cancer. That said, given these favorable MRI findings and the relatively low PSA, it would also be reasonable to observe for now. With that in mind, I would suggest we meet again in 3 months with another PSA and we can discuss further at that time.     Please let him know his results and assist with scheduling follow-up.    Thanks!  Giuseppe

## 2022-04-11 DIAGNOSIS — I10 ESSENTIAL HYPERTENSION, BENIGN: ICD-10-CM

## 2022-04-13 RX ORDER — LOSARTAN POTASSIUM 100 MG/1
TABLET ORAL
Qty: 90 TABLET | Refills: 1 | Status: SHIPPED | OUTPATIENT
Start: 2022-04-13 | End: 2022-10-12

## 2022-04-13 NOTE — TELEPHONE ENCOUNTER
Routing refill request to provider for review/approval because:  Labs out of range:    Potassium   Date Value Ref Range Status   02/11/2022 3.1 (L) 3.4 - 5.3 mmol/L Final   12/23/2020 3.4 3.4 - 5.3 mmol/L Final       Caitlin Chu RN

## 2022-07-19 DIAGNOSIS — E78.5 HYPERLIPIDEMIA LDL GOAL <130: ICD-10-CM

## 2022-07-21 RX ORDER — ATORVASTATIN CALCIUM 10 MG/1
10 TABLET, FILM COATED ORAL DAILY
Qty: 90 TABLET | Refills: 1 | Status: SHIPPED | OUTPATIENT
Start: 2022-07-21 | End: 2023-01-06

## 2022-07-21 NOTE — TELEPHONE ENCOUNTER
Prescription approved per [facility] Refill Protocol.  Denisse Mott, RN  [facility] Triage Nurse

## 2022-07-29 ENCOUNTER — LAB (OUTPATIENT)
Dept: LAB | Facility: CLINIC | Age: 67
End: 2022-07-29
Payer: COMMERCIAL

## 2022-07-29 DIAGNOSIS — R97.20 ELEVATED PROSTATE SPECIFIC ANTIGEN (PSA): ICD-10-CM

## 2022-07-29 LAB — PSA SERPL-MCNC: 4.98 UG/L (ref 0–4)

## 2022-07-29 PROCEDURE — 36415 COLL VENOUS BLD VENIPUNCTURE: CPT

## 2022-07-29 PROCEDURE — 84153 ASSAY OF PSA TOTAL: CPT

## 2022-08-04 ENCOUNTER — OFFICE VISIT (OUTPATIENT)
Dept: UROLOGY | Facility: CLINIC | Age: 67
End: 2022-08-04
Payer: COMMERCIAL

## 2022-08-04 VITALS
HEART RATE: 100 BPM | SYSTOLIC BLOOD PRESSURE: 110 MMHG | WEIGHT: 148 LBS | HEIGHT: 63 IN | OXYGEN SATURATION: 97 % | BODY MASS INDEX: 26.22 KG/M2 | DIASTOLIC BLOOD PRESSURE: 60 MMHG

## 2022-08-04 DIAGNOSIS — N40.1 BPH WITH OBSTRUCTION/LOWER URINARY TRACT SYMPTOMS: ICD-10-CM

## 2022-08-04 DIAGNOSIS — N13.8 BPH WITH OBSTRUCTION/LOWER URINARY TRACT SYMPTOMS: ICD-10-CM

## 2022-08-04 DIAGNOSIS — R97.20 ELEVATED PROSTATE SPECIFIC ANTIGEN (PSA): Primary | ICD-10-CM

## 2022-08-04 LAB — PSA SERPL-MCNC: 6.8 UG/L (ref 0–4)

## 2022-08-04 PROCEDURE — 99214 OFFICE O/P EST MOD 30 MIN: CPT | Performed by: UROLOGY

## 2022-08-04 PROCEDURE — 36415 COLL VENOUS BLD VENIPUNCTURE: CPT | Performed by: UROLOGY

## 2022-08-04 PROCEDURE — 84153 ASSAY OF PSA TOTAL: CPT | Performed by: UROLOGY

## 2022-08-04 ASSESSMENT — PAIN SCALES - GENERAL: PAINLEVEL: NO PAIN (0)

## 2022-08-04 NOTE — LETTER
"8/4/2022       RE: Deven Hernandes  [address] [address]     Dear Colleague,    Thank you for referring your patient, Deven Hernandes, to the [facility]. Please see a copy of my visit note below.      CHIEF COMPLAINT   It was my pleasure to see Deven Hernandes who is a 67 year old male for follow-up of Elevated PSA.      HPI  Deven Hernandes is a very pleasant 67 year old male who presents with a history of Elevated PSA (Prostate Specific Antigen). He was noted at annual physical to have PSA to 5.55. MRI with PIRADS 2 lesion and he returns for PSA recheck. Now at 4.98.    No family history of prostate cancer.  He reports increasing difficulty maintaining erections and he has tried sildenafil with modest effect.  We also discussed his worsening urinary symptoms of weak stream, urgency, and nocturia.      PSA  7/29/2022 - 4.98  3/10/2022 - 5.90  1/24/2022 - 5.55  11/4/2020 - 4.05  10/22/2019 - 3.72    MRI Prostate 3/10/2022  Size: 49 grams  IMPRESSION:  1. Based on the most suspicious abnormality, this exam is  characterized as PIRADS 2 - Clinically significant cancer is unlikely  to be present.    2. No suspicious adenopathy or evidence of pelvic metastases.    PHYSICAL EXAM  Patient is a 67 year old  male   Vitals: Blood pressure 110/60, pulse 100, height 1.6 m (5' 3\"), weight 67.1 kg (148 lb), SpO2 97 %.  General Appearance Adult: Body mass index is 26.22 kg/m .  Alert, no acute distress, oriented  Lungs: no respiratory distress, or pursed lip breathing  Abdomen: soft, nontender, no organomegaly or masses  Back: no CVAT  Neuro: Alert, oriented, speech and mentation normal  Psych: affect and mood normal    Outside and Past Medical records:  Review of the result(s) of each unique test - PSA, MRI    ASSESSMENT and PLAN  67 year old male with elevated PSA. Up as high as 5.90, and now at 4.98. MRI with " PIRADS 2 lesion. We again reviewed the nature of PSA elevation and that while the MRI is reassuring, it does not rule out prostate cancer. Furthermore, while his PSA is down a bit, it does remain elevated. We discussed the pros and cons of continue observation vs TRUS biopsy. Risks of TRUS biopsy were reviewed, including risk of infection. He ultimately elects to proceed with biopsy.    We also discussed BPH with urinary obstruction and option of tamsulosin. Risks and side effects were reviewed. We discussed that tamsulosin and sildenafil should not be taken together. He would like to give tamsulosin a trial.    - Start tamsulosin 0.4 mg daily  - Schedule TRUS biopsy    25 minutes spent on the date of the encounter doing chart review, history and exam, documentation and further activities as noted above.    Phu Conteh MD  Urology  [facility]

## 2022-08-04 NOTE — NURSING NOTE
Chief Complaint   Patient presents with     Elevated PSA     PSA review   Patient states he gets up x3 to urinate at night.  Shonda Bermeo LPN

## 2022-08-04 NOTE — PROGRESS NOTES
"  CHIEF COMPLAINT   It was my pleasure to see Deven Hernandes who is a 67 year old male for follow-up of Elevated PSA.      HPI  Deven Hernandes is a very pleasant 67 year old male who presents with a history of Elevated PSA (Prostate Specific Antigen). He was noted at annual physical to have PSA to 5.55. MRI with PIRADS 2 lesion and he returns for PSA recheck. Now at 4.98.    No family history of prostate cancer.  He reports increasing difficulty maintaining erections and he has tried sildenafil with modest effect.  We also discussed his worsening urinary symptoms of weak stream, urgency, and nocturia.      PSA  7/29/2022 - 4.98  3/10/2022 - 5.90  1/24/2022 - 5.55  11/4/2020 - 4.05  10/22/2019 - 3.72    MRI Prostate 3/10/2022  Size: 49 grams  IMPRESSION:  1. Based on the most suspicious abnormality, this exam is  characterized as PIRADS 2 - Clinically significant cancer is unlikely  to be present.    2. No suspicious adenopathy or evidence of pelvic metastases.    PHYSICAL EXAM  Patient is a 67 year old  male   Vitals: Blood pressure 110/60, pulse 100, height 1.6 m (5' 3\"), weight 67.1 kg (148 lb), SpO2 97 %.  General Appearance Adult: Body mass index is 26.22 kg/m .  Alert, no acute distress, oriented  Lungs: no respiratory distress, or pursed lip breathing  Abdomen: soft, nontender, no organomegaly or masses  Back: no CVAT  Neuro: Alert, oriented, speech and mentation normal  Psych: affect and mood normal    Outside and Past Medical records:  Review of the result(s) of each unique test - PSA, MRI    ASSESSMENT and PLAN  67 year old male with elevated PSA. Up as high as 5.90, and now at 4.98. MRI with PIRADS 2 lesion. We again reviewed the nature of PSA elevation and that while the MRI is reassuring, it does not rule out prostate cancer. Furthermore, while his PSA is down a bit, it does remain elevated. We discussed the pros and cons of continue observation vs TRUS biopsy. Risks of TRUS biopsy were reviewed, " including risk of infection. He ultimately elects to proceed with biopsy.    We also discussed BPH with urinary obstruction and option of tamsulosin. Risks and side effects were reviewed. We discussed that tamsulosin and sildenafil should not be taken together. He would like to give tamsulosin a trial.    - Start tamsulosin 0.4 mg daily  - Schedule TRUS biopsy    25 minutes spent on the date of the encounter doing chart review, history and exam, documentation and further activities as noted above.    Phu Conteh MD  Urology  [facility]

## 2022-08-06 RX ORDER — TAMSULOSIN HYDROCHLORIDE 0.4 MG/1
0.4 CAPSULE ORAL DAILY
Qty: 30 CAPSULE | Refills: 11 | Status: SHIPPED | OUTPATIENT
Start: 2022-08-06 | End: 2022-09-06

## 2022-08-14 ENCOUNTER — TELEPHONE (OUTPATIENT)
Dept: NURSING | Facility: CLINIC | Age: 67
End: 2022-08-14

## 2022-08-15 ENCOUNTER — LAB (OUTPATIENT)
Dept: URGENT CARE | Facility: URGENT CARE | Age: 67
End: 2022-08-15
Payer: COMMERCIAL

## 2022-08-15 DIAGNOSIS — Z20.822 SUSPECTED COVID-19 VIRUS INFECTION: ICD-10-CM

## 2022-08-15 LAB — SARS-COV-2 RNA RESP QL NAA+PROBE: POSITIVE

## 2022-08-15 PROCEDURE — U0003 INFECTIOUS AGENT DETECTION BY NUCLEIC ACID (DNA OR RNA); SEVERE ACUTE RESPIRATORY SYNDROME CORONAVIRUS 2 (SARS-COV-2) (CORONAVIRUS DISEASE [COVID-19]), AMPLIFIED PROBE TECHNIQUE, MAKING USE OF HIGH THROUGHPUT TECHNOLOGIES AS DESCRIBED BY CMS-2020-01-R: HCPCS

## 2022-08-15 PROCEDURE — U0005 INFEC AGEN DETEC AMPLI PROBE: HCPCS

## 2022-08-15 NOTE — TELEPHONE ENCOUNTER
Patient needs a test for his work to prove he is positive for Covid.    Patient declines triage.    Patient transferred to scheduling.  Guadalupe Chapin RN on 8/14/2022 at 7:25 PM

## 2022-08-16 ENCOUNTER — TELEPHONE (OUTPATIENT)
Dept: SCHEDULING | Facility: CLINIC | Age: 67
End: 2022-08-16

## 2022-08-16 NOTE — TELEPHONE ENCOUNTER
Patient classified as COVID treatment eligible by Epic high risk algorithm:  Yes    Coronavirus (COVID-19) Notification    Reason for call  Notify of POSITIVE COVID-19 lab result, assess symptoms,  review [facility] recommendations    Lab Result   Lab test for 2019-nCoV rRt-PCR or SARS-COV-2 PCR  Oropharyngeal AND/OR nasopharyngeal swabs were POSITIVE for 2019-nCoV RNA [OR] SARS-COV-2 RNA (COVID-19) RNA     We have been unable to reach patient by phone at this time to notify of their Positive COVID-19 result.    Left voicemail message requesting a call back to [phone] [facility] for results.        A Positive COVID-19 letter will be sent via [employer] or the mail.    Savanah Villa

## 2022-08-16 NOTE — TELEPHONE ENCOUNTER
Coronavirus (COVID-19) Notification    Caller Name (Patient, parent, daughter/son, grandparent, etc)  Patient    Reason for call  Notify of Positive Coronavirus (COVID-19) lab results, assess symptoms,  review [facility] recommendations    Lab Result    Lab test:  2019-nCoV rRt-PCR or SARS-CoV-2 PCR    Oropharyngeal AND/OR nasopharyngeal swabs is POSITIVE for 2019-nCoV RNA/SARS-COV-2 PCR (COVID-19 virus)      Gather patient reported symptoms   Assessment   Current Symptoms at time of phone call, reported by patient: (if no symptoms, document: No symptoms] Fatigue, cough   Date of symptom(s) onset (if applicable) 8/12     If at time of call, Patients symptoms have worsened, the Patient should contact 911 or have someone drive them to Emergency Dept promptly:      If Patient calling 911, inform 911 personal that you have tested positive for the Coronavirus (COVID-19).  Place mask on and await 911 to arrive.    If Emergency Dept, If possible, please have another adult drive you to the Emergency Dept but you need to wear mask when in contact with other people.      Treatment Options:   Patient classified as COVID treatment eligible by Epic high risk algorithm: Yes  Is the patient symptomatic at the time of result notification? Yes. Was the onset of symptoms within the last 5 days? Yes.   There are now oral medications available for the treatment of COVID-19.  Taking one of these medications within the first five days of symptoms (when people may not yet feel severely ill) has been shown to make people feel better, prevent them from getting sicker, and preventing hospitalization and death.   Does the patient agree to have a visit with a provider to discuss treatment options? No.  Reason patient declined:  Not that sick and don't think I will get worse (save for people who possibly need it more)      Review information with Patient    Your result was positive. This means you have COVID-19 (coronavirus).    How can  I protect others?    These guidelines are for isolating before returning to work, school or .    If you DO have symptoms    Stay home and away from others     For at least 5 days after your symptoms started, AND    You are fever free for 24 hours (with no medicine that reduces fever), AND    Your other symptoms are better    Wear a mask for 10 full days anytime you are around others    If you DON'T have symptoms    Stay home and away from others for at least 5 days after your positive test    Wear a mask for 10 full days anytime you are around others    There may be different guidelines for healthcare facilities.  Please check with the specific sites before arriving.    If you have been told by a doctor that you were severely ill with COVID-19 or are immunocompromised, you should isolate for at least 10 days.    You should not go back to work until you meet the guidelines above for ending your home isolation. You don't need to be retested for COVID-19 before going back to work--studies show that you won't spread the virus if it's been at least 10 days since your symptoms started (or 20 days, if you have a weak immune system).    Employers, schools, and daycares: This is an official notice for this person's medical guidelines for returning in-person.  They must meet the above guidelines before going back to work, school or  in person.    You will receive a positive COVID-19 letter via [employer] or the mail soon with additional self-care information.    Would you like me to review some of that information with you now?  No    If you were tested for an upcoming procedure, please contact your provider for next steps.    Mary Kay Erwin

## 2022-09-06 DIAGNOSIS — N13.8 BPH WITH OBSTRUCTION/LOWER URINARY TRACT SYMPTOMS: ICD-10-CM

## 2022-09-06 DIAGNOSIS — N40.1 BPH WITH OBSTRUCTION/LOWER URINARY TRACT SYMPTOMS: ICD-10-CM

## 2022-09-06 DIAGNOSIS — R97.20 ELEVATED PROSTATE SPECIFIC ANTIGEN (PSA): ICD-10-CM

## 2022-09-06 RX ORDER — TAMSULOSIN HYDROCHLORIDE 0.4 MG/1
0.4 CAPSULE ORAL DAILY
Qty: 90 CAPSULE | Refills: 3 | Status: SHIPPED | OUTPATIENT
Start: 2022-09-06 | End: 2023-06-22

## 2022-10-10 DIAGNOSIS — I10 ESSENTIAL HYPERTENSION, BENIGN: ICD-10-CM

## 2022-10-12 RX ORDER — LOSARTAN POTASSIUM 100 MG/1
TABLET ORAL
Qty: 90 TABLET | Refills: 0 | Status: SHIPPED | OUTPATIENT
Start: 2022-10-12 | End: 2023-01-06

## 2022-11-05 ENCOUNTER — OFFICE VISIT (OUTPATIENT)
Dept: URGENT CARE | Facility: URGENT CARE | Age: 67
End: 2022-11-05
Payer: COMMERCIAL

## 2022-11-05 ENCOUNTER — ANCILLARY PROCEDURE (OUTPATIENT)
Dept: GENERAL RADIOLOGY | Facility: CLINIC | Age: 67
End: 2022-11-05
Attending: NURSE PRACTITIONER
Payer: COMMERCIAL

## 2022-11-05 VITALS
HEART RATE: 90 BPM | TEMPERATURE: 97.6 F | BODY MASS INDEX: 26.57 KG/M2 | OXYGEN SATURATION: 97 % | SYSTOLIC BLOOD PRESSURE: 116 MMHG | WEIGHT: 150 LBS | DIASTOLIC BLOOD PRESSURE: 63 MMHG

## 2022-11-05 DIAGNOSIS — R05.1 ACUTE COUGH: Primary | ICD-10-CM

## 2022-11-05 DIAGNOSIS — R09.89 CHEST CONGESTION: ICD-10-CM

## 2022-11-05 LAB
BASOPHILS # BLD AUTO: 0 10E3/UL (ref 0–0.2)
BASOPHILS NFR BLD AUTO: 0 %
EOSINOPHIL # BLD AUTO: 0.1 10E3/UL (ref 0–0.7)
EOSINOPHIL NFR BLD AUTO: 1 %
ERYTHROCYTE [DISTWIDTH] IN BLOOD BY AUTOMATED COUNT: 12 % (ref 10–15)
HCT VFR BLD AUTO: 39.1 % (ref 40–53)
HGB BLD-MCNC: 13.4 G/DL (ref 13.3–17.7)
LYMPHOCYTES # BLD AUTO: 1.5 10E3/UL (ref 0.8–5.3)
LYMPHOCYTES NFR BLD AUTO: 16 %
MCH RBC QN AUTO: 33.2 PG (ref 26.5–33)
MCHC RBC AUTO-ENTMCNC: 34.3 G/DL (ref 31.5–36.5)
MCV RBC AUTO: 97 FL (ref 78–100)
MONOCYTES # BLD AUTO: 1 10E3/UL (ref 0–1.3)
MONOCYTES NFR BLD AUTO: 11 %
NEUTROPHILS # BLD AUTO: 6.5 10E3/UL (ref 1.6–8.3)
NEUTROPHILS NFR BLD AUTO: 72 %
PLATELET # BLD AUTO: 359 10E3/UL (ref 150–450)
RBC # BLD AUTO: 4.04 10E6/UL (ref 4.4–5.9)
WBC # BLD AUTO: 9.1 10E3/UL (ref 4–11)

## 2022-11-05 PROCEDURE — 99213 OFFICE O/P EST LOW 20 MIN: CPT | Performed by: NURSE PRACTITIONER

## 2022-11-05 PROCEDURE — 85025 COMPLETE CBC W/AUTO DIFF WBC: CPT | Performed by: NURSE PRACTITIONER

## 2022-11-05 PROCEDURE — 36415 COLL VENOUS BLD VENIPUNCTURE: CPT | Performed by: NURSE PRACTITIONER

## 2022-11-05 PROCEDURE — 71046 X-RAY EXAM CHEST 2 VIEWS: CPT | Mod: TC | Performed by: RADIOLOGY

## 2022-11-05 NOTE — PROGRESS NOTES
Assessment & Plan     Acute cough  - CBC with platelets and differential  - XR Chest 2 Views  - CBC with platelets and differential    Chest congestion  - CBC with platelets and differential  - XR Chest 2 Views  - CBC with platelets and differential     Patient Instructions     CBC per baseline, not worrysome for infection.    CXR negative, pending radiology read.    Push fluids  Lots of handwashing.    Rest as able.   Dayquil/nyquil as needed.    Will call if any other labs positive.    F/u in the clinic if symptoms persist or worsen.       Results for orders placed or performed in visit on 11/05/22   CBC with platelets and differential     Status: None (In process)    Narrative    The following orders were created for panel order CBC with platelets and differential.  Procedure                               Abnormality         Status                     ---------                               -----------         ------                     CBC with platelets and d...[[id number]]                      In process                   Please view results for these tests on the individual orders.               Return in about 1 week (around 11/12/2022) for with regular provider if symptoms persist.    RADHA Villatoro [facility] InstituteDYLLAN Mora is a 67 year old male who presents to clinic today for the following health issues:  Chief Complaint   Patient presents with     Cough     Ongoing for 1 week, tested negative for covid-19 on Tuesday      HPI    URI Adult    Onset of symptoms was 1 week(s) ago.  Course of illness is same.    Severity moderate  Current and Associated symptoms: runny nose, cough - non-productive and fatigue  Treatment measures tried include Tylenol/Ibuprofen and OTC Cough med.  Predisposing factors include ill contact: Work.      Review of Systems  Constitutional, HEENT, cardiovascular, pulmonary, GI, , musculoskeletal, neuro, skin, endocrine and psych  systems are negative, except as otherwise noted.      Objective    /63   Pulse 90   Temp 97.6  F (36.4  C) (Tympanic)   Wt 68 kg (150 lb)   SpO2 97%   BMI 26.57 kg/m    Physical Exam   GENERAL: healthy, alert and no distress  EYES: Eyes grossly normal to inspection, PERRL and conjunctivae and sclerae normal  HENT: ear canals and TM's normal, nose and mouth without ulcers or lesions  NECK: no adenopathy, no asymmetry, masses, or scars and thyroid normal to palpation  RESP: lungs clear to auscultation - no rales, rhonchi or wheezes  CV: regular rate and rhythm, normal S1 S2, no S3 or S4, no murmur, click or rub, no peripheral edema and peripheral pulses strong  MS: no gross musculoskeletal defects noted, no edema  SKIN: no suspicious lesions or rashes

## 2022-11-05 NOTE — PATIENT INSTRUCTIONS
CBC per baseline, not worrysome for infection.    CXR negative, pending radiology read.    Push fluids  Lots of handwashing.    Rest as able.   Dayquil/nyquil as needed.    Will call if any other labs positive.    F/u in the clinic if symptoms persist or worsen.       Results for orders placed or performed in visit on 11/05/22   CBC with platelets and differential     Status: None (In process)    Narrative    The following orders were created for panel order CBC with platelets and differential.  Procedure                               Abnormality         Status                     ---------                               -----------         ------                     CBC with platelets and d...[[id number]]                      In process                   Please view results for these tests on the individual orders.

## 2022-11-10 ENCOUNTER — TELEPHONE (OUTPATIENT)
Dept: INTERNAL MEDICINE | Facility: CLINIC | Age: 67
End: 2022-11-10

## 2022-11-10 DIAGNOSIS — R05.9 COUGH, UNSPECIFIED TYPE: Primary | ICD-10-CM

## 2022-11-10 RX ORDER — CODEINE PHOSPHATE AND GUAIFENESIN 10; 100 MG/5ML; MG/5ML
1-2 SOLUTION ORAL EVERY 4 HOURS PRN
Qty: 118 ML | Refills: 0 | Status: SHIPPED | OUTPATIENT
Start: 2022-11-10 | End: 2023-02-06

## 2022-11-10 NOTE — TELEPHONE ENCOUNTER
"Called and spoke to the patient. Relayed message from the provider. Patient states when he tried Codeine in the past it only caused him an \"upset stomach.\" No nausea or vomiting. Patient states it was \"mild\" and he would like to try it again to help his cough.    Will route back to PCP for review.     Pharmacy: [facility] in [place]    Denisse Mott RN    "

## 2022-11-10 NOTE — TELEPHONE ENCOUNTER
PCP, patient was seen in   urgent care 11/5/2022.     His cough is not improving with OTC cough  Medications.       Clear sputum. No fever or chest pain with cough. Cough is keeping him awake at night.     He has headache end of the day. Drinking water.     Chest x-ray was clear 11/5/2022.     Pharmacy is [facility] in [place].     Patient is looking for a stronger cough medicine so he can sleep.     Narcisa Kuhn RN  -[facility]

## 2022-11-10 NOTE — TELEPHONE ENCOUNTER
Called and spoke to the patient. Relayed message from the provider. Patient expressed gratitude and had no additional questions at this time.    Denisse Mott RN

## 2022-11-10 NOTE — TELEPHONE ENCOUNTER
Patient was just seen for evaluation.  Cough can persist for quite some time.  Suggest patient may benefit from trying a different over-the-counter cough suppressant, consider Delsym.  Unable to use codeine suppressant as patient is allergic to such.

## 2023-01-06 DIAGNOSIS — I10 ESSENTIAL HYPERTENSION, BENIGN: ICD-10-CM

## 2023-01-06 DIAGNOSIS — E78.5 HYPERLIPIDEMIA LDL GOAL <130: ICD-10-CM

## 2023-01-06 RX ORDER — ATORVASTATIN CALCIUM 10 MG/1
10 TABLET, FILM COATED ORAL DAILY
Qty: 90 TABLET | Refills: 0 | Status: SHIPPED | OUTPATIENT
Start: 2023-01-06 | End: 2023-04-05

## 2023-01-06 RX ORDER — LOSARTAN POTASSIUM 100 MG/1
TABLET ORAL
Qty: 90 TABLET | Refills: 0 | Status: SHIPPED | OUTPATIENT
Start: 2023-01-06 | End: 2023-04-05

## 2023-01-06 NOTE — TELEPHONE ENCOUNTER
Upon chart review, patient is already scheduled for an appointment.    Appointments in Next Year    Feb 06, 2023  7:30 AM  (Arrive by 7:10 AM)  Annual Wellness Visit with Garrett Clifford MD  [facility] ([facility] ) [phone]        Closing encounter.    Denisse Mott RN

## 2023-01-09 ENCOUNTER — OFFICE VISIT (OUTPATIENT)
Dept: FAMILY MEDICINE | Facility: CLINIC | Age: 68
End: 2023-01-09
Payer: COMMERCIAL

## 2023-01-09 DIAGNOSIS — M54.41 ACUTE RIGHT-SIDED LOW BACK PAIN WITH RIGHT-SIDED SCIATICA: Primary | ICD-10-CM

## 2023-01-09 PROCEDURE — 99213 OFFICE O/P EST LOW 20 MIN: CPT | Performed by: NURSE PRACTITIONER

## 2023-01-09 RX ORDER — HYDROCODONE BITARTRATE AND ACETAMINOPHEN 5; 325 MG/1; MG/1
1 TABLET ORAL 2 TIMES DAILY PRN
Qty: 5 TABLET | Refills: 0 | Status: SHIPPED | OUTPATIENT
Start: 2023-01-09 | End: 2023-01-13

## 2023-01-09 NOTE — PATIENT INSTRUCTIONS
[facility]   [facility]  Located in: [facility]  Address: [address], [place] [address]  Phone: [phone]    Tylenol 1000mg 3-4 times per day     Only once per day of the Naproxen     You can take 0.5-2 pills of the muscle relaxant. See what dose is best for you.     Try the salonpas or icy/hot pain patches                [Mucoid Discharge] : mucoid discharge [Wheezing] : wheezing [Rales] : rales [Tachypnea] : tachypnea [NL] : warm [FreeTextEntry7] : DECREASED BREATH SOUNDS RIGHT

## 2023-01-09 NOTE — LETTER
[address], [address]  [facility] [address]  Phone: [phone]    January 9, 2023        Deven Hernandes  [address] [facility] [address]          To whom it may concern:    RE: Deven Hernandes    Patient was seen and treated today at our clinic and missed work. Please excuse him 1/9- 1/16     Please contact me for questions or concerns.      Sincerely,        RADHA Fields CNP

## 2023-01-09 NOTE — PROGRESS NOTES
Assessment & Plan   Problem List Items Addressed This Visit    None  Visit Diagnoses     Acute right-sided low back pain with right-sided sciatica    -  Primary    Relevant Medications    tiZANidine (ZANAFLEX) 4 MG tablet    HYDROcodone-acetaminophen (NORCO) 5-325 MG tablet    Other Relevant Orders    Physical Therapy Referral           Severe back pain with no red flags. Sent with muscle relaxant. Also sent only #5 [place] for only severe pain. He is not to take norco with tizanidine. Referred to PT. Can use heat/ice, salon pas, icy/hot patches prn. Follow-up with concerns       Ana Garcia, RADHA CNP  [facility] RICKI Mora is a 67 year old accompanied by his spouse, presenting for the following health issues:  No chief complaint on file.      HPI     For the past 6-8 months has had a twinge in the right hip   Now the past week has been terrible   Couldn't get out of bed yesterday   Pain down right lateral leg to ankle   Yesterday foot was tingling, like it fell asleep   No b/b changes   First few steps walking are fine, then it gets to be severe       Review of Systems   Detailed as above         Objective    There were no vitals taken for this visit.    Physical Exam  Constitutional:       Comments: Appears uncomfortable   Pulmonary:      Effort: Pulmonary effort is normal.   Musculoskeletal:      Comments: Right paraspinal lumbar hypertonicity   Tenderness of rigth SI joint   Neurological:      Mental Status: He is alert.      Comments: Neg SLR   Psychiatric:         Mood and Affect: Mood normal.

## 2023-01-13 DIAGNOSIS — M54.41 ACUTE RIGHT-SIDED LOW BACK PAIN WITH RIGHT-SIDED SCIATICA: ICD-10-CM

## 2023-01-13 RX ORDER — HYDROCODONE BITARTRATE AND ACETAMINOPHEN 5; 325 MG/1; MG/1
1 TABLET ORAL 2 TIMES DAILY PRN
Qty: 10 TABLET | Refills: 0 | Status: SHIPPED | OUTPATIENT
Start: 2023-01-13 | End: 2023-02-06

## 2023-01-13 NOTE — TELEPHONE ENCOUNTER
Called and spoke to the patient. Relayed message from the provider. Patient expressed understanding and had no additional questions at this time.    Denisse Mott RN

## 2023-01-13 NOTE — TELEPHONE ENCOUNTER
I will extend for 10 tablets but no further pain meds will be prescribed w/o appt for discussion for ongoing need.

## 2023-01-13 NOTE — TELEPHONE ENCOUNTER
Patient called requesting Hydrocodone refill. States he needs it at night. He is only able to sleep 2-3 hours due to back pain. When taking hydrocodone he is santana to sleep 5-6 hours.  He had PT today.  Triage reviewed AVS with pt and verified he has tried taking tylenol, salon patch, ice hot, ibuprofen and Tizanidine. Triage advised he schedule OV with PCP for advice on [facility] [place] refill or see UC /[facility]. Urgent care hours and locations reviewed with pt. Pt declines to see UC or schedule appt for refill. States he will talk to NP Monday. Pt wants triage to send refill request to NP Cuca. Pt is aware provider is not in office today.     Please advice on Norco refill.     Routing refill request to provider for review/approval because:  Drug not on the [facility] refill protocol

## 2023-01-16 ENCOUNTER — TRANSFERRED RECORDS (OUTPATIENT)
Dept: HEALTH INFORMATION MANAGEMENT | Facility: CLINIC | Age: 68
End: 2023-01-16

## 2023-01-16 NOTE — TELEPHONE ENCOUNTER
Called patient and relayed message. He was in agreement.     Paulina Espinoza RN on 1/16/2023 at 5:43 PM

## 2023-01-16 NOTE — TELEPHONE ENCOUNTER
If he is really having that severe of pain he should see spine. I placed a referral and he will get a call. He should still continue with PT and all other home remedies.   RADHA Yen CNP

## 2023-02-01 ENCOUNTER — TELEPHONE (OUTPATIENT)
Dept: INTERNAL MEDICINE | Facility: CLINIC | Age: 68
End: 2023-02-01
Payer: COMMERCIAL

## 2023-02-01 NOTE — TELEPHONE ENCOUNTER
Pt reports he had an appt with TY Perez and was referred to have PT for back pain. He has herniated disks. Physical therapist advised he needs further imaging. Triage advised appt would be needed for follow up to discuss imaging orders.  Future appt was scheduled.

## 2023-02-02 ENCOUNTER — OFFICE VISIT (OUTPATIENT)
Dept: FAMILY MEDICINE | Facility: CLINIC | Age: 68
End: 2023-02-02
Payer: COMMERCIAL

## 2023-02-02 VITALS
RESPIRATION RATE: 20 BRPM | BODY MASS INDEX: 26.56 KG/M2 | HEIGHT: 63 IN | HEART RATE: 107 BPM | TEMPERATURE: 97.5 F | OXYGEN SATURATION: 98 % | WEIGHT: 149.9 LBS | SYSTOLIC BLOOD PRESSURE: 122 MMHG | DIASTOLIC BLOOD PRESSURE: 83 MMHG

## 2023-02-02 DIAGNOSIS — M54.16 LUMBAR RADICULOPATHY: Primary | ICD-10-CM

## 2023-02-02 DIAGNOSIS — R29.898 WEAKNESS OF RIGHT FOOT: ICD-10-CM

## 2023-02-02 PROCEDURE — 99213 OFFICE O/P EST LOW 20 MIN: CPT | Performed by: NURSE PRACTITIONER

## 2023-02-02 ASSESSMENT — PAIN SCALES - GENERAL: PAINLEVEL: NO PAIN (0)

## 2023-02-02 ASSESSMENT — ENCOUNTER SYMPTOMS: BACK PAIN: 1

## 2023-02-02 NOTE — PROGRESS NOTES
"  Assessment & Plan   Problem List Items Addressed This Visit    None  Visit Diagnoses     Lumbar radiculopathy    -  Primary    Relevant Orders    MR Lumbar Spine w/o Contrast    Weakness of right foot        Relevant Orders    MR Lumbar Spine w/o Contrast           I saw pt a few weeks ago for severe LBP with radiculopathy. He has seen PT and pain has improved but foot weakness/numbness has become more apparent. I spoke with his PT yesterday who had concerns. He does have some weakness on exam today and adds that he can't walk normally and his foot catches.  Will send for MRI.     Ana Garcia, RADHA CNP  [facility] RICKI Mora is a 67 year old, presenting for the following health issues:  Back Pain      Back Pain     History of Present Illness       Reason for visit:  Numbness in right foot    He eats 2-3 servings of fruits and vegetables daily.He consumes 1 sweetened beverage(s) daily.He exercises with enough effort to increase his heart rate 20 to 29 minutes per day.  He exercises with enough effort to increase his heart rate 7 days per week.   He is taking medications regularly.     I saw the pt on 1/9 for severe LBP and he was sent to PT   Back pain has improved. He is only needing OTCs now   Right foot is now numb \"like its asleep\" and very weak   He is tripping when he walks as his foot is dragging       Review of Systems   Musculoskeletal: Positive for back pain.            Objective    /83 (BP Location: Right arm, Patient Position: Sitting, Cuff Size: Adult Regular)   Pulse 107   Temp 97.5  F (36.4  C) (Oral)   Resp 20   Ht 1.6 m (5' 2.99\")   Wt 68 kg (149 lb 14.4 oz)   SpO2 98%   BMI 26.56 kg/m    There is no height or weight on file to calculate BMI.  Physical Exam  Constitutional:       Appearance: Normal appearance.   Cardiovascular:      Pulses:           Dorsalis pedis pulses are 2+ on the right side and 2+ on the left side.   Pulmonary:      Effort: " Pulmonary effort is normal.   Neurological:      Mental Status: He is alert.      Comments: Right foot dorsiflexion 4/5, L 5/5   Decreased sensation to dorsal right foot   Toe walking normal  Slight difficulty with heel walking-able to raise toes but difficulty with raising ball of foot off the ground   Slight antalgic gait with right foot weakness noted   Psychiatric:         Mood and Affect: Mood normal.

## 2023-02-06 ENCOUNTER — OFFICE VISIT (OUTPATIENT)
Dept: INTERNAL MEDICINE | Facility: CLINIC | Age: 68
End: 2023-02-06
Payer: COMMERCIAL

## 2023-02-06 VITALS
HEIGHT: 63 IN | WEIGHT: 149.2 LBS | DIASTOLIC BLOOD PRESSURE: 62 MMHG | HEART RATE: 98 BPM | BODY MASS INDEX: 26.44 KG/M2 | OXYGEN SATURATION: 100 % | SYSTOLIC BLOOD PRESSURE: 102 MMHG | TEMPERATURE: 97.7 F

## 2023-02-06 DIAGNOSIS — M54.50 ACUTE LOW BACK PAIN, UNSPECIFIED BACK PAIN LATERALITY, UNSPECIFIED WHETHER SCIATICA PRESENT: ICD-10-CM

## 2023-02-06 DIAGNOSIS — Z12.5 SCREENING PSA (PROSTATE SPECIFIC ANTIGEN): ICD-10-CM

## 2023-02-06 DIAGNOSIS — Z00.00 ENCOUNTER FOR SUBSEQUENT ANNUAL WELLNESS VISIT IN MEDICARE PATIENT: Primary | ICD-10-CM

## 2023-02-06 DIAGNOSIS — Z12.11 COLON CANCER SCREENING: ICD-10-CM

## 2023-02-06 DIAGNOSIS — E66.01 MORBID OBESITY (H): ICD-10-CM

## 2023-02-06 DIAGNOSIS — I10 ESSENTIAL HYPERTENSION, BENIGN: ICD-10-CM

## 2023-02-06 DIAGNOSIS — E78.5 HYPERLIPIDEMIA LDL GOAL <130: ICD-10-CM

## 2023-02-06 LAB
ALBUMIN SERPL BCG-MCNC: 4.6 G/DL (ref 3.5–5.2)
ALP SERPL-CCNC: 74 U/L (ref 40–129)
ALT SERPL W P-5'-P-CCNC: 19 U/L (ref 10–50)
ANION GAP SERPL CALCULATED.3IONS-SCNC: 14 MMOL/L (ref 7–15)
AST SERPL W P-5'-P-CCNC: 18 U/L (ref 10–50)
BILIRUB SERPL-MCNC: 0.8 MG/DL
BUN SERPL-MCNC: 16.1 MG/DL (ref 8–23)
CALCIUM SERPL-MCNC: 9.6 MG/DL (ref 8.8–10.2)
CHLORIDE SERPL-SCNC: 104 MMOL/L (ref 98–107)
CHOLEST SERPL-MCNC: 181 MG/DL
CREAT SERPL-MCNC: 0.84 MG/DL (ref 0.67–1.17)
DEPRECATED HCO3 PLAS-SCNC: 23 MMOL/L (ref 22–29)
ERYTHROCYTE [DISTWIDTH] IN BLOOD BY AUTOMATED COUNT: 13.1 % (ref 10–15)
GFR SERPL CREATININE-BSD FRML MDRD: >90 ML/MIN/1.73M2
GLUCOSE SERPL-MCNC: 138 MG/DL (ref 70–99)
HBA1C MFR BLD: 5.9 % (ref 0–5.6)
HCT VFR BLD AUTO: 40.5 % (ref 40–53)
HDLC SERPL-MCNC: 61 MG/DL
HGB BLD-MCNC: 13.8 G/DL (ref 13.3–17.7)
LDLC SERPL CALC-MCNC: 89 MG/DL
MCH RBC QN AUTO: 33.3 PG (ref 26.5–33)
MCHC RBC AUTO-ENTMCNC: 34.1 G/DL (ref 31.5–36.5)
MCV RBC AUTO: 98 FL (ref 78–100)
NONHDLC SERPL-MCNC: 120 MG/DL
PLATELET # BLD AUTO: 290 10E3/UL (ref 150–450)
POTASSIUM SERPL-SCNC: 3.3 MMOL/L (ref 3.4–5.3)
PROT SERPL-MCNC: 6.8 G/DL (ref 6.4–8.3)
RBC # BLD AUTO: 4.15 10E6/UL (ref 4.4–5.9)
SODIUM SERPL-SCNC: 141 MMOL/L (ref 136–145)
TRIGL SERPL-MCNC: 154 MG/DL
WBC # BLD AUTO: 7.7 10E3/UL (ref 4–11)

## 2023-02-06 PROCEDURE — 83036 HEMOGLOBIN GLYCOSYLATED A1C: CPT | Performed by: INTERNAL MEDICINE

## 2023-02-06 PROCEDURE — 80053 COMPREHEN METABOLIC PANEL: CPT | Performed by: INTERNAL MEDICINE

## 2023-02-06 PROCEDURE — G0439 PPPS, SUBSEQ VISIT: HCPCS | Performed by: INTERNAL MEDICINE

## 2023-02-06 PROCEDURE — 80061 LIPID PANEL: CPT | Performed by: INTERNAL MEDICINE

## 2023-02-06 PROCEDURE — 99213 OFFICE O/P EST LOW 20 MIN: CPT | Mod: 25 | Performed by: INTERNAL MEDICINE

## 2023-02-06 PROCEDURE — 85027 COMPLETE CBC AUTOMATED: CPT | Performed by: INTERNAL MEDICINE

## 2023-02-06 PROCEDURE — 36415 COLL VENOUS BLD VENIPUNCTURE: CPT | Performed by: INTERNAL MEDICINE

## 2023-02-06 ASSESSMENT — ENCOUNTER SYMPTOMS
DYSURIA: 0
HEARTBURN: 1
NAUSEA: 0
DIZZINESS: 0
WEAKNESS: 0
FEVER: 0
HEMATURIA: 0
ARTHRALGIAS: 0
CHILLS: 0
ABDOMINAL PAIN: 0
FREQUENCY: 0
PARESTHESIAS: 0
EYE PAIN: 0
PALPITATIONS: 0
SHORTNESS OF BREATH: 0
DIARRHEA: 0
MYALGIAS: 0
CONSTIPATION: 0
COUGH: 0
HEMATOCHEZIA: 0
HEADACHES: 0
JOINT SWELLING: 0
NERVOUS/ANXIOUS: 0
SORE THROAT: 0

## 2023-02-06 ASSESSMENT — ACTIVITIES OF DAILY LIVING (ADL): CURRENT_FUNCTION: NO ASSISTANCE NEEDED

## 2023-02-06 NOTE — PROGRESS NOTES
"SUBJECTIVE:   Morgan is a 67 year old who presents for Preventive Visit.  Patient has been advised of split billing requirements and indicates understanding: Yes  Are you in the first 12 months of your Medicare coverage?  No    Healthy Habits:     In general, how would you rate your overall health?  Good    Frequency of exercise:  4-5 days/week    Duration of exercise:  15-30 minutes    Do you usually eat at least 4 servings of fruit and vegetables a day, include whole grains    & fiber and avoid regularly eating high fat or \"junk\" foods?  Yes    Taking medications regularly:  No    Barriers to taking medications:  Side effects    Medication side effects:  Other    Ability to successfully perform activities of daily living:  No assistance needed    Home Safety:  No safety concerns identified    Hearing Impairment:  Difficulty following a conversation in a noisy restaurant or crowded room    In the past 6 months, have you been bothered by leaking of urine? Yes    In general, how would you rate your overall mental or emotional health?  Good      PHQ-2 Total Score: 0    Additional concerns today:  Yes      Have you ever done Advance Care Planning? (For example, a Health Directive, POLST, or a discussion with a medical provider or your loved ones about your wishes): No, advance care planning information given to patient to review.  Patient declined advance care planning discussion at this time.       Fall risk:  low    Cognitive Screening   1) Repeat 3 items (Leader, Season, Table)    2) Clock draw: NORMAL  3) 3 item recall: Recalls 3 objects  Results: 3 items recalled: COGNITIVE IMPAIRMENT LESS LIKELY    Mini-CogTM Copyright GAVIOTA Kumar. Licensed by the author for use in [facility]; reprinted with permission (berna@.[facility]). All rights reserved.      Do you have sleep apnea, excessive snoring or daytime drowsiness?: no    Reviewed and updated as needed this visit by clinical staff                  Reviewed and " updated as needed this visit by Provider                 Social History     Tobacco Use     Smoking status: Former     Packs/day: 0.25     Years: 20.00     Pack years: 5.00     Types: Cigarettes     Quit date: 2003     Years since quittin.2     Smokeless tobacco: Never   Substance Use Topics     Alcohol use: Yes     Comment: socially         Alcohol Use 2023   Prescreen: >3 drinks/day or >7 drinks/week? No   Prescreen: >3 drinks/day or >7 drinks/week? -       Current Outpatient Medications   Medication     amLODIPine (NORVASC) 10 MG tablet     atorvastatin (LIPITOR) 10 MG tablet     losartan (COZAAR) 100 MG tablet     METAMUCIL 30.9 % OR POWD     MULTIVITAMIN CHEW   OR     omeprazole (PRILOSEC) 20 MG CR capsule     sildenafil (VIAGRA) 100 MG tablet     tamsulosin (FLOMAX) 0.4 MG capsule     No current facility-administered medications for this visit.       Current providers sharing in care for this patient include:   Patient Care Team:  Garrett Clifford MD as PCP - General (Internal Medicine)  Stephen Szymanski MD as MD (Gastroenterology)  Garrett Clifford MD as Assigned PCP  Phu Conteh MD as MD (Urology)  Phu Conteh MD as Assigned Cancer Care Provider    The following health maintenance items are reviewed in Epic and correct as of today:  Health Maintenance   Topic Date Due     ZOSTER IMMUNIZATION (1 of 2) Never done     DTAP/TDAP/TD IMMUNIZATION (3 - Td or Tdap) 2020     MEDICARE ANNUAL WELLNESS VISIT  2021     Pneumococcal Vaccine: 65+ Years (2 - PCV) 2021     COVID-19 Vaccine (4 - Booster for Moderna series) 2022     LIPID  2023     ANNUAL REVIEW OF HM ORDERS  2023     FALL RISK ASSESSMENT  2024     COLORECTAL CANCER SCREENING  2025     ADVANCE CARE PLANNING  2025     HEPATITIS C SCREENING  Completed     PHQ-2 (once per calendar year)  Completed     INFLUENZA VACCINE  Completed     AORTIC ANEURYSM SCREENING  "(SYSTEM ASSIGNED)  Completed     IPV IMMUNIZATION  Aged Out     MENINGITIS IMMUNIZATION  Aged Out       Immunization History   Administered Date(s) Administered     COVID-19 Vaccine 18+ (Moderna) 02/26/2021, 03/26/2021, 12/23/2021     Influenza (High Dose) 3 valent vaccine 10/13/2019, 09/01/2020     Influenza (IIV3) PF 11/24/2010, 10/27/2011, 10/19/2012, 10/01/2014, 10/02/2016     Influenza Vaccine 65+ (Fluzone HD) 12/23/2021, 10/13/2022     Influenza Vaccine >6 months (Alfuria,Fluzone) 11/14/2018     Pneumococcal 23 valent 11/04/2020     TD (ADULT, 7+) 01/01/1998     TDAP Vaccine (Adacel) 03/03/2010         Review of Systems   Constitutional: Negative for chills and fever.   HENT: Negative for congestion, ear pain, hearing loss and sore throat.    Eyes: Negative for pain and visual disturbance.   Respiratory: Negative for cough and shortness of breath.    Cardiovascular: Negative for chest pain, palpitations and peripheral edema.   Gastrointestinal: Positive for heartburn. Negative for abdominal pain, constipation, diarrhea, hematochezia and nausea.   Genitourinary: Positive for impotence. Negative for dysuria, frequency, genital sores, hematuria, penile discharge and urgency.   Musculoskeletal: Negative for arthralgias, joint swelling and myalgias.   Skin: Negative for rash.   Neurological: Negative for dizziness, weakness, headaches and paresthesias.   Psychiatric/Behavioral: Negative for mood changes. The patient is not nervous/anxious.      Noted leg pain and back pain, scheduled for MRI scan.  Patient has been seeing another provider for this.  He notes that it was more bothersome after he lifted snow recently.  He has been undergoing some therapy.  He reports primarily a slight tingling sensation most noted in his right versus left leg.    OBJECTIVE:   /62   Pulse 98   Temp 97.7  F (36.5  C) (Temporal)   Ht 1.598 m (5' 2.9\")   Wt 67.7 kg (149 lb 3.2 oz)   SpO2 100%   BMI 26.51 kg/m   Estimated " "body mass index is 26.56 kg/m  as calculated from the following:    Height as of 2/2/23: 1.6 m (5' 2.99\").    Weight as of 2/2/23: 68 kg (149 lb 14.4 oz).     Physical Exam  GENERAL:  alert and no distress  EYES: Eyes grossly normal to inspection, PERRL and conjunctivae and sclerae normal  HENT: ear canals and TM's normal, nose and mouth without ulcers or lesions  NECK: no adenopathy, no asymmetry, masses, or scars and thyroid normal to palpation  RESP: lungs clear to auscultation - no rales, rhonchi or wheezes  CV: regular rate and rhythm, normal S1 S2, no S3 or S4, no click or rub  ABDOMEN: soft, nontender, no hepatosplenomegaly, no masses and bowel sounds normal  MS: no gross musculoskeletal defects noted, no edema, gait is slightly antalgic  NEURO: No new focal changes  PSYCH: mentation appears normal, affect normal/bright      ASSESSMENT / PLAN:   (Z00.00) Encounter for subsequent annual wellness visit in Medicare patient  (primary encounter diagnosis)  Comment: Advised patient update shingles, tetanus and pneumococcal vaccine.  He will do this through his pharmacy  Plan: CBC with platelets, Comprehensive metabolic         panel, Lipid Profile, Hemoglobin A1c            (I10) Essential hypertension, benign  Comment: Stable on therapy continuing with medical management  Plan: Comprehensive metabolic panel            (E66.01) Obesity (BMI 35.0-39.9) with comorbidity (H)  Comment: Encouraged ongoing weight loss and weight reduction  Plan:     (E78.5) Hyperlipidemia LDL goal <130  Comment: Labs ordered as fasting per routine.  Plan: Lipid Profile            (Z12.5) Screening PSA (prostate specific antigen)  Comment: Patient follow-up per urology with testing done accordingly.  Plan:     (Z12.11) Colon cancer screening  Comment: Prior colonoscopy reviewed and new orders placed for repeat scan as recommended every 3 years  Plan: Fecal colorectal cancer screen FIT, Colonoscopy        Screening  Referral       " "     (M54.50) Acute low back pain, unspecified back pain laterality, unspecified whether sciatica present  Comment: Following up as directed.  Already seeing a provider for such.  Scheduled for MRI imaging.  Plan: Discussed natural progression of disc disease    Patient has been advised of split billing requirements and indicates understanding: Yes      COUNSELING:  Reviewed preventive health counseling, as reflected in patient instructions       Regular exercise       Healthy diet/nutrition      BMI:   Estimated body mass index is 26.56 kg/m  as calculated from the following:    Height as of 2/2/23: 1.6 m (5' 2.99\").    Weight as of 2/2/23: 68 kg (149 lb 14.4 oz).   Weight management plan: Discussed healthy diet and exercise guidelines      He reports that he quit smoking about 19 years ago. His smoking use included cigarettes. He has a 5.00 pack-year smoking history. He has never used smokeless tobacco.      Appropriate preventive services were discussed with this patient, including applicable screening as appropriate for cardiovascular disease, diabetes, osteopenia/osteoporosis, and glaucoma.  As appropriate for age/gender, discussed screening for colorectal cancer, prostate cancer, breast cancer, and cervical cancer. Checklist reviewing preventive services available has been given to the patient.    Reviewed patients plan of care and provided an AVS. The Basic Care Plan (routine screening as documented in Health Maintenance) for Deven meets the Care Plan requirement. This Care Plan has been established and reviewed with the Patient.      Garrett Clifford MD  [facility]    Identified Health Risks:  "

## 2023-02-07 ENCOUNTER — HOSPITAL ENCOUNTER (OUTPATIENT)
Dept: MRI IMAGING | Facility: CLINIC | Age: 68
Discharge: HOME | End: 2023-02-07
Attending: NURSE PRACTITIONER | Admitting: NURSE PRACTITIONER
Payer: COMMERCIAL

## 2023-02-07 DIAGNOSIS — M54.16 LUMBAR RADICULOPATHY: ICD-10-CM

## 2023-02-07 DIAGNOSIS — R29.898 WEAKNESS OF RIGHT FOOT: ICD-10-CM

## 2023-02-07 PROCEDURE — 72148 MRI LUMBAR SPINE W/O DYE: CPT

## 2023-02-09 NOTE — RESULT ENCOUNTER NOTE
Moe Mora, looks like you do have a disc herniation that is hitting one of your nerves, hence your leg symptoms. We can either have you continue with Shun or we can get input from neurosurgery to see if an injection or any other intervention would be helpful. As long as the tingling and weakness is improving we could continue as we are doing. You can also discuss this with Shun as well. Let me know what you think. Either way you should still continue with Shun.   Ana

## 2023-02-15 ENCOUNTER — LAB (OUTPATIENT)
Dept: LAB | Facility: CLINIC | Age: 68
End: 2023-02-15
Payer: COMMERCIAL

## 2023-02-15 DIAGNOSIS — I10 ESSENTIAL HYPERTENSION, BENIGN: ICD-10-CM

## 2023-02-15 LAB — POTASSIUM SERPL-SCNC: 3.6 MMOL/L (ref 3.4–5.3)

## 2023-02-15 PROCEDURE — 36415 COLL VENOUS BLD VENIPUNCTURE: CPT

## 2023-02-15 PROCEDURE — 84132 ASSAY OF SERUM POTASSIUM: CPT

## 2023-03-06 NOTE — TELEPHONE ENCOUNTER
NEUROSURGERY- NEW PREVISIT PLANNING       Record Status/Location     Referring Provider In chart Referred by Ana Garcia APRN CNP in [facility]   Diagnosis  Lumbar ridiculopathy   MRI (HEAD, NECK, SPINE) In chart Yes 02/07/2023   CT  no   X-ray  no   INJECTION  no   PHYSICAL THERAPY Patient will hand deliver/ scan in chart Yes, 2023 w/ [facility] in [place]   SURGERY  no

## 2023-03-07 ENCOUNTER — OFFICE VISIT (OUTPATIENT)
Dept: NEUROSURGERY | Facility: CLINIC | Age: 68
End: 2023-03-07
Attending: NURSE PRACTITIONER
Payer: COMMERCIAL

## 2023-03-07 ENCOUNTER — PRE VISIT (OUTPATIENT)
Dept: NEUROSURGERY | Facility: CLINIC | Age: 68
End: 2023-03-07

## 2023-03-07 VITALS — HEART RATE: 109 BPM | DIASTOLIC BLOOD PRESSURE: 70 MMHG | SYSTOLIC BLOOD PRESSURE: 119 MMHG | OXYGEN SATURATION: 96 %

## 2023-03-07 DIAGNOSIS — M54.16 LUMBAR RADICULOPATHY: ICD-10-CM

## 2023-03-07 PROCEDURE — 99205 OFFICE O/P NEW HI 60 MIN: CPT | Performed by: NEUROLOGICAL SURGERY

## 2023-03-07 ASSESSMENT — PAIN SCALES - GENERAL: PAINLEVEL: MILD PAIN (3)

## 2023-03-07 NOTE — LETTER
3/7/2023         RE: Deven Hernandes  [address] [facility] [place] [address]        Dear Colleague,    Thank you for referring your patient, Deven Hernandes, to the [facility]. Please see a copy of my visit note below.    NEUROSURGERY CONSULTATION NOTE      Neurosurgery was asked to see this patient by Ana Garcia A* for evaluation of right leg pain.       CONSULTATION ASSESSMENT AND PLAN:    Mr. Hernandes is a 67-year-old right-handed male who presented with a long history of low back pain for more than a year with recent worsening for last couple of months.  He also reports pain in his right leg for last 2 months along the L4 dermatome.  He also reported weakness in the form of difficulty in clearing the ground while walking.    His MRI showed right L3-4 paracentral disc extrusion with compression of the traversing L4 nerve root.  Given that his clinical and imaging findings are concordant, I recommend right laminectomy and microdiscectomy for the relief of symptoms.  I discussed with the patient regarding the procedure and the risk associated with  the procedure including but not limited to bleeding, infection, injury to the thecal sac, injury to the nerve root, CSF leak, need for additional procedure and others.  I also discussed with the patient the option of conservative management, however given that patient has weakness and ongoing compression of the nerve root conservative management is unlikely to relieve his pain in short duration.    All the questions were answered, patient is willing to talk to his wife before committing for the procedure.  I will order x-ray LS spine standing flexion-extension views prior to proceeding with the procedure.  I will schedule the procedure once patient agreed for the same.  He can contact us if they have any further questions or concerns or any worsening neurological deficits.    I spent more than 60 minutes in this  apt, examining the pt, reviewing the scans, reviewing notes from chart, discussing treatment options with risks and benefits and coordinating care.     Tip Jeffries MD      HPI:   Mr. Hernandes is a 67-year-old right-handed male who presented with a long history of low back pain for more than a year following ground-level fall.  His back pain subsided with recent worsening for last couple of months.  He reports he also started noticing pain radiating to his right leg since then.  He describes his pain getting radiated from his low back to right buttock and wraps around the leg to involve the big toe and the medial aspect of the second toe.  He reports pain being sharp and shooting in nature in the beginning and has been deep aching more recently.  He describes pain as 3 on 10 on VAS.  His pain aggravates with twisting bending forward and towards the end of the day when he is more fatigued.  He has tried Tylenol over-the-counter with no significant relief of his symptoms.    He also describes pain being associated with weakness in the right leg in the form of not able to clear the ground and make voluntary effort to lift his right foot off the ground.  No difficulty in getting up from squatting position.  He does not report back pain at this point.    He tried physical therapy with injection which resulted in relief of his low back pain with no significant impact on his weakness and right leg radicular pain.    He reports no significant heart and lung issues and does not take any antiplatelets or anticoagulants.    He works in a [employer] which sometimes involves heavy lifting.    He does not smoke, consume alcohol or recreational drugs.    Past Medical History:   Diagnosis Date     Esophageal reflux      Essential hypertension, benign      Hyperglycemia      Hyperlipidemia      Mumps      Tobacco use disorder     dced 2003       Past Surgical History:   Procedure Laterality Date     COLONOSCOPY  04/19/2017      ESOPHAGOSCOPY, GASTROSCOPY, DUODENOSCOPY (EGD), COMBINED N/A 07/31/2018    Procedure: COMBINED ESOPHAGOSCOPY, GASTROSCOPY, DUODENOSCOPY (EGD), BIOPSY SINGLE OR MULTIPLE;  gastroscopy;  Surgeon: Stephen Skelton MD;  Location:  GI     TONSILLECTOMY      as a child     [facility] NONSPECIFIC PROCEDURE      tonsillectomy     [facility] NONSPECIFIC PROCEDURE  2001    nl colonoscopy       REVIEW OF SYSTEMS:  Review Of Systems  Skin: negative  Eyes: negative  Ears/Nose/Throat: negative  Respiratory: No shortness of breath, dyspnea on exertion, cough, or hemoptysis  Cardiovascular: negative  Gastrointestinal: negative  Genitourinary: negative  Musculoskeletal: negative  Neurologic: right leg pain  Psychiatric: negative  Hematologic/Lymphatic/Immunologic: negative  Endocrine: negative    MEDICATIONS:  Current Outpatient Medications   Medication Sig Dispense Refill     amLODIPine (NORVASC) 10 MG tablet Take 1 tablet (10 mg) by mouth daily 90 tablet 3     atorvastatin (LIPITOR) 10 MG tablet Take 1 tablet (10 mg) by mouth daily 90 tablet 0     losartan (COZAAR) 100 MG tablet Take 1 tablet (100 mg) by mouth daily 90 tablet 0     METAMUCIL 30.9 % OR POWD as directed 0 0     MULTIVITAMIN CHEW   OR Senior       omeprazole (PRILOSEC) 20 MG CR capsule ONE DAILY 90 capsule 3     sildenafil (VIAGRA) 100 MG tablet Take 1 tablet (100 mg) by mouth daily as needed (sexual activity) 30 tablet 3     tamsulosin (FLOMAX) 0.4 MG capsule Take 1 capsule (0.4 mg) by mouth daily 90 capsule 3         ALLERGIES/SENSITIVITIES:     Allergies   Allergen Reactions     Amoxicillin      Codeine      gi     Lansoprazole Diarrhea     Lisinopril Cough     Niacin Other (See Comments)     heartburn     Penicillins      Verapamil Other (See Comments)     Constipation         PERTINENT SOCIAL HISTORY: Non smoker  Social History     Socioeconomic History     Marital status:      Spouse name: None     Number of children: None     Years of education: None     Highest  education level: None   Tobacco Use     Smoking status: Former     Packs/day: 0.25     Years: 20.00     Pack years: 5.00     Types: Cigarettes     Quit date: 2003     Years since quittin.3     Smokeless tobacco: Never   Vaping Use     Vaping Use: Never used   Substance and Sexual Activity     Alcohol use: Yes     Comment: socially     Drug use: No     Sexual activity: Yes     Partners: Female   Other Topics Concern     Parent/sibling w/ CABG, MI or angioplasty before 65F 55M? No         FAMILY HISTORY:  Family History   Problem Relation Age of Onset     Respiratory Mother         b:1926   emphysema, ?heart problems, HTN     Hypertension Mother      Cerebrovascular Disease Mother      Cancer Father         d:age 82   colon cancer     Colon Cancer Father      Family History Negative Brother         b:8     Diabetes Brother      Hypertension Brother      Arthritis Sister         b:   unsure of type     Diabetes Cousin         PHYSICAL EXAM:   Constitutional: /70   Pulse 109   SpO2 96%      Mental Status: A & O in no acute distress.  Affect is appropriate.  Speech is fluent.  Recent and remote memory are intact.  Attention span and concentration are normal.   Naming, Calculation, Reading, Writing, Drawing, recent and remote memory intact     Cranial Nerves:   CN1: grossly intact per patient recall.   CN2: No funduscopic exam performed.   CN3,4 & 6: Pupillary light response, lateral and vertical gaze normal.  No nystagmus.  Visual fields are full to confrontation.   CN5: Intact to touch   CN7: No facial weakness, smile, facial symmetry intact.   CN8: Intact to spoken voice.   CN9&10: Gag reflex, uvula midline, palate rises with phonation.   CN11: Shoulder shrug 5/5 intact bilaterally.   CN12: Tongue midline and moves freely from side to side.     Motor: No pronator drift of upper extremity.   Normal bulk and tone all muscle groups of upper and lower extremities.       Delt Bi Tri Hand Flex/  Ext  Iliopsoas Quadriceps Tibialis Anterior EHL Gastroc     C5 C6 C7 C8/T1 L2 L3 L4 L5 S1   R 5 5 5 5 5 5 4 4+ 5   L 5 5 5 5 5 5 5 5 5          Sensory: Sensation intact bilaterally to light touch.     Coordination; finger to nose, heel to shin, rapid alternating movements smooth and rhythmic.   Romberg intact.   /tandem gait intact.    Normal gait and station.  Toe walking impaired on the right  High-stepping gait on the right     Reflexes;                       Right              Left  Brachioradialis (C5,6)      2+                 2+  Biceps   (C5,6)                 2+                 2+  Triceps  (C7,8)                 2+                2+  Knee (L3,4)                      2+                2+  Ankle jerk (S1,2)              2+                2+      No hoffmans/babinski/ clonus.    No paraspinal tenderness.    IMAGING:  I personally reviewed all radiographic images and agreed with the findings of paracentral disc extrusion on the right at L3-4 level causing compression of the traversing right L4 nerve root.     02/07/2023: MRI LS spine:   1.  Moderate narrowing of the right lateral recess at L3-L4 due to large right paracentral disc extrusion. Probable impingement of the traversing right L4 nerve root.  2.  Mild narrowing of the lateral recesses at L4-L5.  3.  Moderate right and mild to moderate left neuroforaminal narrowing at L3-L4.  4.  Moderate left and mild to moderate right neuroforaminal narrowing at L4-L5.    Cc:   Garrett Clifford, thank you for allowing me to participate in the care of your patient.        Sincerely,        Tip Jeffries MD

## 2023-03-07 NOTE — NURSING NOTE
"Deven Hernandes is a 67 year old male who presents for:  Chief Complaint   Patient presents with     Consult     Chronic sharp right sided low back pain, extending down leg to feet with numbness and tingling        Initial Vitals:  /70   Pulse 109   SpO2 96%  Estimated body mass index is 26.51 kg/m  as calculated from the following:    Height as of 2/6/23: 5' 2.9\" (1.598 m).    Weight as of 2/6/23: 149 lb 3.2 oz (67.7 kg).. There is no height or weight on file to calculate BSA. BP completed using cuff size: regular  Mild Pain (3)    Nursing Comments:     Sherrell Flores    "

## 2023-03-07 NOTE — PROGRESS NOTES
NEUROSURGERY CONSULTATION NOTE      Neurosurgery was asked to see this patient by Ana Garcia A* for evaluation of right leg pain.       CONSULTATION ASSESSMENT AND PLAN:    Mr. Hernandes is a 67-year-old right-handed male who presented with a long history of low back pain for more than a year with recent worsening for last couple of months.  He also reports pain in his right leg for last 2 months along the L4 dermatome.  He also reported weakness in the form of difficulty in clearing the ground while walking.    His MRI showed right L3-4 paracentral disc extrusion with compression of the traversing L4 nerve root.  Given that his clinical and imaging findings are concordant, I recommend right laminectomy and microdiscectomy for the relief of symptoms.  I discussed with the patient regarding the procedure and the risk associated with  the procedure including but not limited to bleeding, infection, injury to the thecal sac, injury to the nerve root, CSF leak, need for additional procedure and others.  I also discussed with the patient the option of conservative management, however given that patient has weakness and ongoing compression of the nerve root conservative management is unlikely to relieve his pain in short duration.    All the questions were answered, patient is willing to talk to his wife before committing for the procedure.  I will order x-ray LS spine standing flexion-extension views prior to proceeding with the procedure.  I will schedule the procedure once patient agreed for the same.  He can contact us if they have any further questions or concerns or any worsening neurological deficits.    I spent more than 60 minutes in this apt, examining the pt, reviewing the scans, reviewing notes from chart, discussing treatment options with risks and benefits and coordinating care.     Tip Jeffries MD      HPI:   Mr. Hernandes is a 67-year-old right-handed male who presented with a long history of low  back pain for more than a year following ground-level fall.  His back pain subsided with recent worsening for last couple of months.  He reports he also started noticing pain radiating to his right leg since then.  He describes his pain getting radiated from his low back to right buttock and wraps around the leg to involve the big toe and the medial aspect of the second toe.  He reports pain being sharp and shooting in nature in the beginning and has been deep aching more recently.  He describes pain as 3 on 10 on VAS.  His pain aggravates with twisting bending forward and towards the end of the day when he is more fatigued.  He has tried Tylenol over-the-counter with no significant relief of his symptoms.    He also describes pain being associated with weakness in the right leg in the form of not able to clear the ground and make voluntary effort to lift his right foot off the ground.  No difficulty in getting up from squatting position.  He does not report back pain at this point.    He tried physical therapy with injection which resulted in relief of his low back pain with no significant impact on his weakness and right leg radicular pain.    He reports no significant heart and lung issues and does not take any antiplatelets or anticoagulants.    He works in a [employer] which sometimes involves heavy lifting.    He does not smoke, consume alcohol or recreational drugs.    Past Medical History:   Diagnosis Date     Esophageal reflux      Essential hypertension, benign      Hyperglycemia      Hyperlipidemia      Mumps      Tobacco use disorder     dced 2003       Past Surgical History:   Procedure Laterality Date     COLONOSCOPY  04/19/2017     ESOPHAGOSCOPY, GASTROSCOPY, DUODENOSCOPY (EGD), COMBINED N/A 07/31/2018    Procedure: COMBINED ESOPHAGOSCOPY, GASTROSCOPY, DUODENOSCOPY (EGD), BIOPSY SINGLE OR MULTIPLE;  gastroscopy;  Surgeon: Stephen Skelton MD;  Location:  GI     TONSILLECTOMY      as a child     [facility]  NONSPECIFIC PROCEDURE      tonsillectomy     [facility] NONSPECIFIC PROCEDURE  2001    nl colonoscopy       REVIEW OF SYSTEMS:  Review Of Systems  Skin: negative  Eyes: negative  Ears/Nose/Throat: negative  Respiratory: No shortness of breath, dyspnea on exertion, cough, or hemoptysis  Cardiovascular: negative  Gastrointestinal: negative  Genitourinary: negative  Musculoskeletal: negative  Neurologic: right leg pain  Psychiatric: negative  Hematologic/Lymphatic/Immunologic: negative  Endocrine: negative    MEDICATIONS:  Current Outpatient Medications   Medication Sig Dispense Refill     amLODIPine (NORVASC) 10 MG tablet Take 1 tablet (10 mg) by mouth daily 90 tablet 3     atorvastatin (LIPITOR) 10 MG tablet Take 1 tablet (10 mg) by mouth daily 90 tablet 0     losartan (COZAAR) 100 MG tablet Take 1 tablet (100 mg) by mouth daily 90 tablet 0     METAMUCIL 30.9 % OR POWD as directed 0 0     MULTIVITAMIN CHEW   OR Senior       omeprazole (PRILOSEC) 20 MG CR capsule ONE DAILY 90 capsule 3     sildenafil (VIAGRA) 100 MG tablet Take 1 tablet (100 mg) by mouth daily as needed (sexual activity) 30 tablet 3     tamsulosin (FLOMAX) 0.4 MG capsule Take 1 capsule (0.4 mg) by mouth daily 90 capsule 3         ALLERGIES/SENSITIVITIES:     Allergies   Allergen Reactions     Amoxicillin      Codeine      gi     Lansoprazole Diarrhea     Lisinopril Cough     Niacin Other (See Comments)     heartburn     Penicillins      Verapamil Other (See Comments)     Constipation         PERTINENT SOCIAL HISTORY: Non smoker  Social History     Socioeconomic History     Marital status:      Spouse name: None     Number of children: None     Years of education: None     Highest education level: None   Tobacco Use     Smoking status: Former     Packs/day: 0.25     Years: 20.00     Pack years: 5.00     Types: Cigarettes     Quit date: 2003     Years since quittin.3     Smokeless tobacco: Never   Vaping Use     Vaping Use: Never used    Substance and Sexual Activity     Alcohol use: Yes     Comment: socially     Drug use: No     Sexual activity: Yes     Partners: Female   Other Topics Concern     Parent/sibling w/ CABG, MI or angioplasty before 65F 55M? No         FAMILY HISTORY:  Family History   Problem Relation Age of Onset     Respiratory Mother         b:1926   emphysema, ?heart problems, HTN     Hypertension Mother      Cerebrovascular Disease Mother      Cancer Father         d:age 82   colon cancer     Colon Cancer Father      Family History Negative Brother         b:1948     Diabetes Brother      Hypertension Brother      Arthritis Sister         b:1950   unsure of type     Diabetes Cousin         PHYSICAL EXAM:   Constitutional: /70   Pulse 109   SpO2 96%      Mental Status: A & O in no acute distress.  Affect is appropriate.  Speech is fluent.  Recent and remote memory are intact.  Attention span and concentration are normal.   Naming, Calculation, Reading, Writing, Drawing, recent and remote memory intact     Cranial Nerves:   CN1: grossly intact per patient recall.   CN2: No funduscopic exam performed.   CN3,4 & 6: Pupillary light response, lateral and vertical gaze normal.  No nystagmus.  Visual fields are full to confrontation.   CN5: Intact to touch   CN7: No facial weakness, smile, facial symmetry intact.   CN8: Intact to spoken voice.   CN9&10: Gag reflex, uvula midline, palate rises with phonation.   CN11: Shoulder shrug 5/5 intact bilaterally.   CN12: Tongue midline and moves freely from side to side.     Motor: No pronator drift of upper extremity.   Normal bulk and tone all muscle groups of upper and lower extremities.       Delt Bi Tri Hand Flex/  Ext Iliopsoas Quadriceps Tibialis Anterior EHL Gastroc     C5 C6 C7 C8/T1 L2 L3 L4 L5 S1   R 5 5 5 5 5 5 4 4+ 5   L 5 5 5 5 5 5 5 5 5          Sensory: Sensation intact bilaterally to light touch.     Coordination; finger to nose, heel to shin, rapid alternating movements  smooth and rhythmic.   Romberg intact.   /tandem gait intact.    Normal gait and station.  Toe walking impaired on the right  High-stepping gait on the right     Reflexes;                       Right              Left  Brachioradialis (C5,6)      2+                 2+  Biceps   (C5,6)                 2+                 2+  Triceps  (C7,8)                 2+                2+  Knee (L3,4)                      2+                2+  Ankle jerk (S1,2)              2+                2+      No hoffmans/babinski/ clonus.    No paraspinal tenderness.    IMAGING:  I personally reviewed all radiographic images and agreed with the findings of paracentral disc extrusion on the right at L3-4 level causing compression of the traversing right L4 nerve root.     02/07/2023: MRI LS spine:   1.  Moderate narrowing of the right lateral recess at L3-L4 due to large right paracentral disc extrusion. Probable impingement of the traversing right L4 nerve root.  2.  Mild narrowing of the lateral recesses at L4-L5.  3.  Moderate right and mild to moderate left neuroforaminal narrowing at L3-L4.  4.  Moderate left and mild to moderate right neuroforaminal narrowing at L4-L5.    Cc:   Garrett Clifford

## 2023-03-07 NOTE — NURSING NOTE
Called patient to discuss getting scheduled for surgery. Patient states he is ready to schedule surgery and has spoken with Kourtney in surgery scheduling.     Staff message sent to Dr. Jeffries and CRISSY to place case request. Nursing will contact patient with surgery education once scheduled.

## 2023-03-07 NOTE — PATIENT INSTRUCTIONS
Patient Next Steps:    Order placed for x-ray. If you do not hear from anyone within 1-2 business days you can call the numbers below to schedule.  [phone]    Call us when you decide about surgery. We will provide additional education at that time.      Please call us if you have any further questions or concerns.    [facility]   Phone: [phone]  Fax: [phone]

## 2023-03-08 ENCOUNTER — ANCILLARY PROCEDURE (OUTPATIENT)
Dept: GENERAL RADIOLOGY | Facility: CLINIC | Age: 68
End: 2023-03-08
Attending: NEUROLOGICAL SURGERY
Payer: COMMERCIAL

## 2023-03-08 DIAGNOSIS — M54.16 LUMBAR RADICULOPATHY: ICD-10-CM

## 2023-03-08 PROCEDURE — 72100 X-RAY EXAM L-S SPINE 2/3 VWS: CPT

## 2023-03-17 ENCOUNTER — TELEPHONE (OUTPATIENT)
Dept: NEUROSURGERY | Facility: CLINIC | Age: 68
End: 2023-03-17
Payer: COMMERCIAL

## 2023-03-17 NOTE — TELEPHONE ENCOUNTER
Called and spoke with patient gave him surgery details for 4/12/23 with . Patient verbalized understanding.

## 2023-03-20 ENCOUNTER — TELEPHONE (OUTPATIENT)
Dept: NEUROSURGERY | Facility: CLINIC | Age: 68
End: 2023-03-20
Payer: COMMERCIAL

## 2023-03-20 NOTE — TELEPHONE ENCOUNTER
Called patient, not available for surgery education right now.     Sent education via [employer] for patient to review.   He will call back at a more convenient time

## 2023-03-20 NOTE — PATIENT INSTRUCTIONS
Patient Instructions    Surgery scheduled at [facility] for MINIMALLY INVASIVE RIGHT LUMBAR 3 TO LUMBAR 4 DISCECTOMY with Dr. Jeffries on 4/12/23    Pre-Operative      Surgical risks: blood clots in the leg or lung, problems urinating, nerve damage, drainage from the incision, infection, stiffness    Pre-operative physical with primary care physician within 30 days of surgical date. Looks like this is scheduled on 4/4/23.    Likely same day procedure with discharge home day of surgery, may stay for 23 hour observation hospitalization for monitoring.    Shower procedure  o Please shower with antimicrobial soap the night before surgery and morning of surgery. Please refer to showering instruction sheet in folder.    Eating/Drinking  o Stop all solid foods 8 hours before surgery.  o Keep drinking clear liquids until 4 hours before surgery  Clear liquids include water, clear juice, black coffee, or clear tea without milk, Gatorade, clear soda.    Medications  o Hold Aspirin, NSAIDs (Advil/Ibuprofen, Indocin, Naproxen, Nuprin, Relafen/Nabumetone, Diclofenac, Meloxicam, Aleve, Celebrex) x 7 days prior to surgical date  o You can take Tylenol (Acetaminophen) for pain, 1000 mg  Do not exceed 2400 mg per day  o If you are on chronic pain medication (oxycodone, Percocet, hydrocodone, Vicodin, Norco, Dilaudid, morphine, MS Contin, naltrexone, Suboxone, etc) or have a pain contract we will reach out to your pain clinic to gather your most recent records and recommendations for pain management post-op. Please ask your provider who manages your chronic pain if they require you to schedule an office visit prior to surgery. Continue obtaining your pain medications from your current provider until surgery. Our team will manage your acute post-op pain in the hospital and during the recovery period. Your pain team will continue to manage your chronic pain.  o Any other medications prescribed, please discuss with your  primary care provider at your pre-operative physical      You may NOT receive the COVID-19 vaccine 72 hours before or after surgery.    Pain Management      You will have post-operative incisional pain which may require pain medications and muscle relaxants. You will receive medication upon discharge.    You may resume taking NSAIDs (ex. Ibuprofen, aleve, naproxen) 2 weeks after surgery.    Do NOT drive while taking narcotic pain medication    Do NOT drink alcohol while using any pain medication    You can utilize ice as needed (no longer than 20 minutes at one time)    Incision Care      No submerging incision in water such as pools, hot tubs, baths for at least 8 weeks or until incision is healed    It is okay to shower after 48 hours, just pat the incision dry    Remove dressing as instructed upon discharge    Watch for signs of infection  o Redness, swelling, warmth, drainage, and fever of 101 degrees or higher  o Notif clinic [phone].    Activity Restrictions      For the first 6-8 weeks, no lifting > 10 pounds, limited bending, twisting, or overhead reaching.    Take stairs in moderation    Ok to walk as tolerated, take short frequent walks. You may gradually increase the distance as tolerated.    Avoid bed rest and prolonged sitting for longer than 30 minutes (change positions frequently while awake)    No contact sports until after follow up visit    No high impact activities such as; running/jogging, snowmobile or 4 garcia riding or any other recreational vehicles    Please call the clinic if you develop any of the following symptoms:  o Swelling and/or warmth in one or both legs  o Pain or tenderness in your leg, ankle, foot, or arm  o Red or discolored skin    Post-Op Follow Up Appointments      2 week incision check with RN or Surgeon    6 week post op follow up visit with Dr Jeffries on 5/23/23    3 months post op with Dr. Jeffries on 7/11/23    Please call to schedule follow up appointment at  [phone]    Resources      If you are currently employed, you will need to be off work for 2-4 weeks for post op recovery and healing.    Please fax any FMLA/short term disability paperwork to [phone]    You may call our clinic when you'd like to return to work and we can provide a work letter    To allow staff adequate time to complete paperwork, please send as soon as possible    [facility]  Phone: [phone]  Fax: [phone]

## 2023-03-21 NOTE — TELEPHONE ENCOUNTER
Reviewed pre- and post-operative instructions as outlined in the After Visit Summary/Patient Instructions with patient over the phone.  Surgery folder was picked up by patient      Patient Education Topic: Procedure with Dr. Jeffries     Learner(s): Patient    Knowledge Level: Basic    Readiness to Learn: Ready    Method:  Verbal explanation and Written material     Outcome: Able to verbalize instructions    Barriers to Learning: No barrier    Covid Testing: n/a    Scheduling Number: n/a    NDI/STEVE: Confirmation of completion within last 6 months    Pain Clinic: N/A    Patient had the opportunity for questions to be answered. Advised Patient to call clinic with any questions/concerns. Patient will  soap at clinic.

## 2023-03-22 ENCOUNTER — DOCUMENTATION ONLY (OUTPATIENT)
Dept: NEUROSURGERY | Facility: CLINIC | Age: 68
End: 2023-03-22
Payer: COMMERCIAL

## 2023-03-22 NOTE — PROGRESS NOTES
Received blank Certicfication of HCP form from [employer]. Scanned in to Media.   To be completed.

## 2023-03-24 DIAGNOSIS — N52.01 ERECTILE DYSFUNCTION DUE TO ARTERIAL INSUFFICIENCY: ICD-10-CM

## 2023-03-28 NOTE — PROGRESS NOTES
RTW release form and certification of health care provider form with [employer] completed and signed by Dr. Jeffries. Placed in bin to be faxed and scanned. Routed message to VF/MA pool.

## 2023-03-28 NOTE — PROGRESS NOTES
Faxed Form March 28, 2023 to fax number [phone].    Right Fax confirmed at 2:02 PM    Yesenia Waller

## 2023-04-04 DIAGNOSIS — I10 ESSENTIAL HYPERTENSION, BENIGN: ICD-10-CM

## 2023-04-04 DIAGNOSIS — E78.5 HYPERLIPIDEMIA LDL GOAL <130: ICD-10-CM

## 2023-04-05 ENCOUNTER — DOCUMENTATION ONLY (OUTPATIENT)
Dept: NEUROSURGERY | Facility: CLINIC | Age: 68
End: 2023-04-05
Payer: COMMERCIAL

## 2023-04-05 RX ORDER — LOSARTAN POTASSIUM 100 MG/1
TABLET ORAL
Qty: 90 TABLET | Refills: 1 | Status: SHIPPED | OUTPATIENT
Start: 2023-04-05 | End: 2023-10-02

## 2023-04-05 RX ORDER — AMLODIPINE BESYLATE 10 MG/1
10 TABLET ORAL DAILY
Qty: 90 TABLET | Refills: 2 | Status: SHIPPED | OUTPATIENT
Start: 2023-04-05 | End: 2023-12-27

## 2023-04-05 RX ORDER — ATORVASTATIN CALCIUM 10 MG/1
10 TABLET, FILM COATED ORAL DAILY
Qty: 90 TABLET | Refills: 2 | Status: SHIPPED | OUTPATIENT
Start: 2023-04-05 | End: 2023-12-27

## 2023-04-06 ENCOUNTER — OFFICE VISIT (OUTPATIENT)
Dept: INTERNAL MEDICINE | Facility: CLINIC | Age: 68
End: 2023-04-06
Payer: COMMERCIAL

## 2023-04-06 VITALS
OXYGEN SATURATION: 100 % | HEIGHT: 62 IN | BODY MASS INDEX: 28.71 KG/M2 | WEIGHT: 156 LBS | RESPIRATION RATE: 20 BRPM | SYSTOLIC BLOOD PRESSURE: 114 MMHG | DIASTOLIC BLOOD PRESSURE: 73 MMHG | TEMPERATURE: 97.1 F | HEART RATE: 77 BPM

## 2023-04-06 DIAGNOSIS — K21.9 GASTROESOPHAGEAL REFLUX DISEASE WITHOUT ESOPHAGITIS: ICD-10-CM

## 2023-04-06 DIAGNOSIS — R73.9 HYPERGLYCEMIA: ICD-10-CM

## 2023-04-06 DIAGNOSIS — Z01.818 PREOP GENERAL PHYSICAL EXAM: Primary | ICD-10-CM

## 2023-04-06 DIAGNOSIS — I10 ESSENTIAL HYPERTENSION, BENIGN: ICD-10-CM

## 2023-04-06 DIAGNOSIS — M54.16 LUMBAR RADICULOPATHY: ICD-10-CM

## 2023-04-06 DIAGNOSIS — N40.1 BENIGN PROSTATIC HYPERPLASIA WITH LOWER URINARY TRACT SYMPTOMS, SYMPTOM DETAILS UNSPECIFIED: ICD-10-CM

## 2023-04-06 DIAGNOSIS — E78.5 HYPERLIPIDEMIA LDL GOAL <130: ICD-10-CM

## 2023-04-06 PROBLEM — E66.01 MORBID OBESITY (H): Status: RESOLVED | Noted: 2019-10-22 | Resolved: 2023-04-06

## 2023-04-06 PROCEDURE — 99214 OFFICE O/P EST MOD 30 MIN: CPT | Performed by: INTERNAL MEDICINE

## 2023-04-06 NOTE — PATIENT INSTRUCTIONS
Approval given to proceed with proposed procedure, without further diagnostic evaluation..  No solid food after midnight prior to your  procedure. May have clear liquids up to 2 hours prior to the  procedure  May use Tylenol for pain control  between then and your procedure  Take amlodipine, atorvastatin, losartan, omeprazole, tamsulosin  when you first get up the  AM of the  procedure    Hold other prescription and over the counter medications/supplements the day of the  procedure. May resume usual medications/supplements after the procedure unless instructed otherwise by your surgeon

## 2023-04-06 NOTE — PROGRESS NOTES
[address] [address]  Phone: [phone]  Primary Provider: Garrett Clifford  Pre-op Performing Provider: YEISON JAIN      PREOPERATIVE EVALUATION:  Today's date: 4/6/2023    Deven Hernandes is a 67 year old male who presents for a preoperative evaluation.       View : No data to display.              Surgical Information:  Surgery/Procedure:    Minimally invasive right  L3-4 discectomy   Surgery Location: [facility]  Surgeon: Tip Jeffries MD  Surgery Date: 4/12/2023  Time of Surgery: 3:00 PM  Where patient plans to recover: At home with family  Fax number for surgical facility: Note does not need to be faxed, will be available electronically in Epic.    Assessment & Plan     The proposed surgical procedure is considered INTERMEDIATE risk.    Preop general physical exam  Appears medically stable to proceed with surgery as scheduled    Lumbar radiculopathy  Ongoing right lower extremity radiculopathy symptoms to the foot.  Minimal back pain now.  Has failed conservative treatment    Hyperlipidemia LDL goal <130  Controlled with statin therapy    Essential hypertension, benign  Controlled with amlodipine, losartan    Benign prostatic hyperplasia with lower urinary tract symptoms, symptom details unspecified  Controlled with tamsulosin    Gastroesophageal reflux disease without esophagitis  Controlled with omeprazole.  Patient states he has recurrence of symptoms off of PPI therapy    Hyperglycemia  Recent A1c 5.9.  Being managed with diet             Risks and Recommendations:  The patient has the following additional risks and recommendations for perioperative complications:   - No identified additional risk factors other than previously addressed    Patient instructions:   Approval given to proceed with proposed procedure, without further diagnostic evaluation..  No solid food after midnight prior to your  procedure. May have clear liquids up to  2 hours prior to the  procedure  May use Tylenol for pain control  between then and your procedure  Take amlodipine, atorvastatin, losartan, omeprazole, tamsulosin  when you first get up the  AM of the  procedure    Hold other prescription and over the counter medications/supplements the day of the  procedure. May resume usual medications/supplements after the procedure unless instructed otherwise by your surgeon     RECOMMENDATION:  APPROVAL GIVEN to proceed with proposed procedure, without further diagnostic evaluation.       HPI related to upcoming procedure: Discectomy  Meeting patient for the first time today.  Normally followed by Dr. Clifford  History of chronic low back pain with radicular symptoms into the right lower extremity along the L4 dermatome.  Lumbar MRI showing  right L3-4 paracentral disc extrusion with compression of the traversing L4 nerve root.  Has failed conservative treatment and following consultation with neurosurgery, patient has elected to undergo surgical management  Regarding exercise tolerance, patient does Yoga exercises daily and does some walking exercise without chest pain or shortness of breath          4/6/2023    10:06 AM   Preop Questions   1. Have you ever had a heart attack or stroke? No   2. Have you ever had surgery on your heart or blood vessels, such as a stent placement, a coronary artery bypass, or surgery on an artery in your head, neck, heart, or legs? No   3. Do you have chest pain with activity? No   4. Do you have a history of  heart failure? No   5. Do you currently have a cold, bronchitis or symptoms of other infection? No   6. Do you have a cough, shortness of breath, or wheezing? No   7. Do you or anyone in your family have previous history of blood clots? No   8. Do you or does anyone in your family have a serious bleeding problem such as prolonged bleeding following surgeries or cuts? No   9. Have you ever had problems with anemia or been told to take iron  pills? YES -in distant past.  Hemoglobin normal with lab monitoring the past 4 years per review of chart   10. Have you had any abnormal blood loss such as black, tarry or bloody stools? No   11. Have you ever had a blood transfusion? No   12. Are you willing to have a blood transfusion if it is medically needed before, during, or after your surgery? Yes   13. Have you or any of your relatives ever had problems with anesthesia? No   14. Do you have sleep apnea, excessive snoring or daytime drowsiness? No   15. Do you have any artifical heart valves or other implanted medical devices like a pacemaker, defibrillator, or continuous glucose monitor? No   16. Do you have artificial joints? No   17. Are you allergic to latex? No       Health Care Directive:  Patient does not have a Health Care Directive or Living Will: Discussed advance care planning with patient; information given to patient to review.    Preoperative Review of :   reviewed - Patient received a total of 15 tabs hydrocodone through 2 Rxs in january 2023      Status of Chronic Conditions:  HYPERLIPIDEMIA - Patient has a long history of significant Hyperlipidemia requiring medication for treatment with recent good control. Patient reports no problems or side effects with the medication.     HYPERTENSION - Patient has longstanding history of HTN , currently denies any symptoms referable to elevated blood pressure. Specifically denies chest pain, palpitations, dyspnea, orthopnea, PND or peripheral edema. Blood pressure readings have been in normal range. Current medication regimen is as listed below. Patient denies any side effects of medication.     GERD - controlled with PPI.  Has recurrence off of medication therapy    BPH -symptoms controlled with tamsulosin    Review of Systems  CONSTITUTIONAL: NEGATIVE for fever, chills. Mild weight gain. Less active with back   issues  INTEGUMENTARY/SKIN: NEGATIVE for worrisome rashes, moles or lesions  EYES:  NEGATIVE for vision changes or irritation. Has glasses  ENT/MOUTH: NEGATIVE for ear, mouth and throat problems  RESP: NEGATIVE for significant cough or SOB  CV: NEGATIVE for chest pain, palpitations or peripheral edema  GI: NEGATIVE for nausea, abdominal pain, heartburn, or change in bowel habits  : NEGATIVE for frequency, dysuria, or hematuria  MUSCULOSKELETAL: NEGATIVE for significant arthralgias or myalgia except for  LBP  NEURO: NEGATIVE for weakness, dizziness. POSITIVE for right LE radicular sx to the foot  ENDOCRINE: NEGATIVE for temperature intolerance, skin/hair changes  HEME: NEGATIVE for bleeding problems  PSYCHIATRIC: NEGATIVE for changes in mood or affect    Patient Active Problem List    Diagnosis Date Noted     Lumbar radiculopathy 04/06/2023     Priority: Medium     Gastroesophageal reflux disease without esophagitis 04/06/2023     Priority: Medium     Benign prostatic hyperplasia with lower urinary tract symptoms, symptom details unspecified 04/06/2023     Priority: Medium     Elevated prostate specific antigen (PSA) 03/12/2022     Priority: Medium     Erectile dysfunction due to arterial insufficiency 03/12/2022     Priority: Medium     Hyperglycemia      Priority: Medium     Hyperlipidemia LDL goal <130 10/16/2008     Priority: Medium     Plantar fascial fibromatosis 01/24/2008     Priority: Medium     Essential hypertension, benign 06/10/2005     Priority: Medium     Esophageal reflux 06/10/2005     Priority: Medium      Past Medical History:   Diagnosis Date     Esophageal reflux      Essential hypertension, benign      Hyperglycemia      Hyperlipidemia      Mumps      Tobacco use disorder     dced 2003     Past Surgical History:   Procedure Laterality Date     COLONOSCOPY  04/19/2017     ESOPHAGOSCOPY, GASTROSCOPY, DUODENOSCOPY (EGD), COMBINED N/A 07/31/2018    Procedure: COMBINED ESOPHAGOSCOPY, GASTROSCOPY, DUODENOSCOPY (EGD), BIOPSY SINGLE OR MULTIPLE;  gastroscopy;  Surgeon: Stephen Skelton  MD VIRGIL;  Location:  GI     TONSILLECTOMY      as a child     ZZC NONSPECIFIC PROCEDURE      tonsillectomy     ZZC NONSPECIFIC PROCEDURE      nl colonoscopy     Current Outpatient Medications   Medication Sig Dispense Refill     amLODIPine (NORVASC) 10 MG tablet Take 1 tablet (10 mg) by mouth daily 90 tablet 2     atorvastatin (LIPITOR) 10 MG tablet Take 1 tablet (10 mg) by mouth daily 90 tablet 2     losartan (COZAAR) 100 MG tablet Take 1 tablet (100 mg) by mouth daily 90 tablet 1     METAMUCIL 30.9 % OR POWD as directed 0 0     MULTIVITAMIN CHEW   OR Senior       omeprazole (PRILOSEC) 20 MG CR capsule ONE DAILY 90 capsule 3     sildenafil (VIAGRA) 100 MG tablet Take 1 tablet (100 mg) by mouth daily as needed (sexual activity) 30 tablet 3     tamsulosin (FLOMAX) 0.4 MG capsule Take 1 capsule (0.4 mg) by mouth daily 90 capsule 3       Allergies   Allergen Reactions     Amoxicillin      Codeine      gi     Lansoprazole Diarrhea     Lisinopril Cough     Niacin Other (See Comments)     heartburn     Penicillins      Verapamil Other (See Comments)     Constipation          Social History     Tobacco Use     Smoking status: Former     Packs/day: 0.25     Years: 20.00     Pack years: 5.00     Types: Cigarettes     Quit date: 2003     Years since quittin.3     Smokeless tobacco: Never   Vaping Use     Vaping status: Never Used   Substance Use Topics     Alcohol use: Yes     Comment: socially     Family History   Problem Relation Age of Onset     Respiratory Mother         b:192   emphysema, ?heart problems, HTN     Hypertension Mother      Cerebrovascular Disease Mother      Cancer Father         d:age 82   colon cancer     Colon Cancer Father      Family History Negative Brother         b:1948     Diabetes Brother      Hypertension Brother      Arthritis Sister         b:   unsure of type     Diabetes Cousin      History   Drug Use No         Objective     /73   Pulse 77   Temp 97.1  F (36.2  " C) (Tympanic)   Resp 20   Ht 1.575 m (5' 2\")   Wt 70.8 kg (156 lb)   SpO2 100%   BMI 28.53 kg/m      Physical Exam  Eye: PERRL, EOMI  HENT: ear canals and TM's normal and nose and mouth without ulcers or lesions   Neck: no adenopathy. Thyroid normal to palpation. No bruits  Pulm: Lungs clear to auscultation   CV: Regular rates and rhythm  GI: Soft, nontender, Normal active bowel sounds, No hepatosplenomegaly or masses palpable  Ext: Peripheral pulses intact. No edema.  Neuro: Normal strength and tone, sensory exam grossly normal  Back:  Nontender currently to general palpation bilateral  paralumbar musculature. Equivocal right SLRT    Recent Labs   Lab Test 02/15/23  0817 02/06/23  0739 11/05/22  1305 02/11/22  0901 01/24/22  0731   HGB  --  13.8 13.4  --  15.0   PLT  --  290 359  --  325   NA  --  141  --   --  138   POTASSIUM 3.6 3.3*  --    < > 3.2*   CR  --  0.84  --   --  0.81   A1C  --  5.9*  --   --  6.0*    < > = values in this interval not displayed.        Diagnostics:  No labs were ordered during this visit.   No EKG required, no history of coronary heart disease, significant arrhythmia, peripheral arterial disease or other structural heart disease.    Revised Cardiac Risk Index (RCRI):  The patient has the following serious cardiovascular risks for perioperative complications:   - No serious cardiac risks = 0 points     RCRI Interpretation: 0 points: Class I (very low risk - 0.4% complication rate)           Signed Electronically by: Brian Rosenthal MD  Copy of this evaluation report is provided to requesting physician.      "

## 2023-04-06 NOTE — H&P (VIEW-ONLY)
[address] [address]  Phone: [phone]  Primary Provider: Garrett Clifford  Pre-op Performing Provider: YEISON JAIN      PREOPERATIVE EVALUATION:  Today's date: 4/6/2023    Deven Hernandes is a 67 year old male who presents for a preoperative evaluation.       View : No data to display.              Surgical Information:  Surgery/Procedure:    Minimally invasive right  L3-4 discectomy   Surgery Location: [facility]  Surgeon: Tip Jeffries MD  Surgery Date: 4/12/2023  Time of Surgery: 3:00 PM  Where patient plans to recover: At home with family  Fax number for surgical facility: Note does not need to be faxed, will be available electronically in Epic.    Assessment & Plan     The proposed surgical procedure is considered INTERMEDIATE risk.    Preop general physical exam  Appears medically stable to proceed with surgery as scheduled    Lumbar radiculopathy  Ongoing right lower extremity radiculopathy symptoms to the foot.  Minimal back pain now.  Has failed conservative treatment    Hyperlipidemia LDL goal <130  Controlled with statin therapy    Essential hypertension, benign  Controlled with amlodipine, losartan    Benign prostatic hyperplasia with lower urinary tract symptoms, symptom details unspecified  Controlled with tamsulosin    Gastroesophageal reflux disease without esophagitis  Controlled with omeprazole.  Patient states he has recurrence of symptoms off of PPI therapy    Hyperglycemia  Recent A1c 5.9.  Being managed with diet             Risks and Recommendations:  The patient has the following additional risks and recommendations for perioperative complications:   - No identified additional risk factors other than previously addressed    Patient instructions:   Approval given to proceed with proposed procedure, without further diagnostic evaluation..  No solid food after midnight prior to your  procedure. May have clear liquids up to  2 hours prior to the  procedure  May use Tylenol for pain control  between then and your procedure  Take amlodipine, atorvastatin, losartan, omeprazole, tamsulosin  when you first get up the  AM of the  procedure    Hold other prescription and over the counter medications/supplements the day of the  procedure. May resume usual medications/supplements after the procedure unless instructed otherwise by your surgeon     RECOMMENDATION:  APPROVAL GIVEN to proceed with proposed procedure, without further diagnostic evaluation.       HPI related to upcoming procedure: Discectomy  Meeting patient for the first time today.  Normally followed by Dr. Clifford  History of chronic low back pain with radicular symptoms into the right lower extremity along the L4 dermatome.  Lumbar MRI showing  right L3-4 paracentral disc extrusion with compression of the traversing L4 nerve root.  Has failed conservative treatment and following consultation with neurosurgery, patient has elected to undergo surgical management  Regarding exercise tolerance, patient does Yoga exercises daily and does some walking exercise without chest pain or shortness of breath          4/6/2023    10:06 AM   Preop Questions   1. Have you ever had a heart attack or stroke? No   2. Have you ever had surgery on your heart or blood vessels, such as a stent placement, a coronary artery bypass, or surgery on an artery in your head, neck, heart, or legs? No   3. Do you have chest pain with activity? No   4. Do you have a history of  heart failure? No   5. Do you currently have a cold, bronchitis or symptoms of other infection? No   6. Do you have a cough, shortness of breath, or wheezing? No   7. Do you or anyone in your family have previous history of blood clots? No   8. Do you or does anyone in your family have a serious bleeding problem such as prolonged bleeding following surgeries or cuts? No   9. Have you ever had problems with anemia or been told to take iron  pills? YES -in distant past.  Hemoglobin normal with lab monitoring the past 4 years per review of chart   10. Have you had any abnormal blood loss such as black, tarry or bloody stools? No   11. Have you ever had a blood transfusion? No   12. Are you willing to have a blood transfusion if it is medically needed before, during, or after your surgery? Yes   13. Have you or any of your relatives ever had problems with anesthesia? No   14. Do you have sleep apnea, excessive snoring or daytime drowsiness? No   15. Do you have any artifical heart valves or other implanted medical devices like a pacemaker, defibrillator, or continuous glucose monitor? No   16. Do you have artificial joints? No   17. Are you allergic to latex? No       Health Care Directive:  Patient does not have a Health Care Directive or Living Will: Discussed advance care planning with patient; information given to patient to review.    Preoperative Review of :   reviewed - Patient received a total of 15 tabs hydrocodone through 2 Rxs in january 2023      Status of Chronic Conditions:  HYPERLIPIDEMIA - Patient has a long history of significant Hyperlipidemia requiring medication for treatment with recent good control. Patient reports no problems or side effects with the medication.     HYPERTENSION - Patient has longstanding history of HTN , currently denies any symptoms referable to elevated blood pressure. Specifically denies chest pain, palpitations, dyspnea, orthopnea, PND or peripheral edema. Blood pressure readings have been in normal range. Current medication regimen is as listed below. Patient denies any side effects of medication.     GERD - controlled with PPI.  Has recurrence off of medication therapy    BPH -symptoms controlled with tamsulosin    Review of Systems  CONSTITUTIONAL: NEGATIVE for fever, chills. Mild weight gain. Less active with back   issues  INTEGUMENTARY/SKIN: NEGATIVE for worrisome rashes, moles or lesions  EYES:  NEGATIVE for vision changes or irritation. Has glasses  ENT/MOUTH: NEGATIVE for ear, mouth and throat problems  RESP: NEGATIVE for significant cough or SOB  CV: NEGATIVE for chest pain, palpitations or peripheral edema  GI: NEGATIVE for nausea, abdominal pain, heartburn, or change in bowel habits  : NEGATIVE for frequency, dysuria, or hematuria  MUSCULOSKELETAL: NEGATIVE for significant arthralgias or myalgia except for  LBP  NEURO: NEGATIVE for weakness, dizziness. POSITIVE for right LE radicular sx to the foot  ENDOCRINE: NEGATIVE for temperature intolerance, skin/hair changes  HEME: NEGATIVE for bleeding problems  PSYCHIATRIC: NEGATIVE for changes in mood or affect    Patient Active Problem List    Diagnosis Date Noted     Lumbar radiculopathy 04/06/2023     Priority: Medium     Gastroesophageal reflux disease without esophagitis 04/06/2023     Priority: Medium     Benign prostatic hyperplasia with lower urinary tract symptoms, symptom details unspecified 04/06/2023     Priority: Medium     Elevated prostate specific antigen (PSA) 03/12/2022     Priority: Medium     Erectile dysfunction due to arterial insufficiency 03/12/2022     Priority: Medium     Hyperglycemia      Priority: Medium     Hyperlipidemia LDL goal <130 10/16/2008     Priority: Medium     Plantar fascial fibromatosis 01/24/2008     Priority: Medium     Essential hypertension, benign 06/10/2005     Priority: Medium     Esophageal reflux 06/10/2005     Priority: Medium      Past Medical History:   Diagnosis Date     Esophageal reflux      Essential hypertension, benign      Hyperglycemia      Hyperlipidemia      Mumps      Tobacco use disorder     dced 2003     Past Surgical History:   Procedure Laterality Date     COLONOSCOPY  04/19/2017     ESOPHAGOSCOPY, GASTROSCOPY, DUODENOSCOPY (EGD), COMBINED N/A 07/31/2018    Procedure: COMBINED ESOPHAGOSCOPY, GASTROSCOPY, DUODENOSCOPY (EGD), BIOPSY SINGLE OR MULTIPLE;  gastroscopy;  Surgeon: Stephen Skelton  MD VIRGIL;  Location:  GI     TONSILLECTOMY      as a child     ZZC NONSPECIFIC PROCEDURE      tonsillectomy     ZZC NONSPECIFIC PROCEDURE      nl colonoscopy     Current Outpatient Medications   Medication Sig Dispense Refill     amLODIPine (NORVASC) 10 MG tablet Take 1 tablet (10 mg) by mouth daily 90 tablet 2     atorvastatin (LIPITOR) 10 MG tablet Take 1 tablet (10 mg) by mouth daily 90 tablet 2     losartan (COZAAR) 100 MG tablet Take 1 tablet (100 mg) by mouth daily 90 tablet 1     METAMUCIL 30.9 % OR POWD as directed 0 0     MULTIVITAMIN CHEW   OR Senior       omeprazole (PRILOSEC) 20 MG CR capsule ONE DAILY 90 capsule 3     sildenafil (VIAGRA) 100 MG tablet Take 1 tablet (100 mg) by mouth daily as needed (sexual activity) 30 tablet 3     tamsulosin (FLOMAX) 0.4 MG capsule Take 1 capsule (0.4 mg) by mouth daily 90 capsule 3       Allergies   Allergen Reactions     Amoxicillin      Codeine      gi     Lansoprazole Diarrhea     Lisinopril Cough     Niacin Other (See Comments)     heartburn     Penicillins      Verapamil Other (See Comments)     Constipation          Social History     Tobacco Use     Smoking status: Former     Packs/day: 0.25     Years: 20.00     Pack years: 5.00     Types: Cigarettes     Quit date: 2003     Years since quittin.3     Smokeless tobacco: Never   Vaping Use     Vaping status: Never Used   Substance Use Topics     Alcohol use: Yes     Comment: socially     Family History   Problem Relation Age of Onset     Respiratory Mother         b:192   emphysema, ?heart problems, HTN     Hypertension Mother      Cerebrovascular Disease Mother      Cancer Father         d:age 82   colon cancer     Colon Cancer Father      Family History Negative Brother         b:1948     Diabetes Brother      Hypertension Brother      Arthritis Sister         b:   unsure of type     Diabetes Cousin      History   Drug Use No         Objective     /73   Pulse 77   Temp 97.1  F (36.2  " C) (Tympanic)   Resp 20   Ht 1.575 m (5' 2\")   Wt 70.8 kg (156 lb)   SpO2 100%   BMI 28.53 kg/m      Physical Exam  Eye: PERRL, EOMI  HENT: ear canals and TM's normal and nose and mouth without ulcers or lesions   Neck: no adenopathy. Thyroid normal to palpation. No bruits  Pulm: Lungs clear to auscultation   CV: Regular rates and rhythm  GI: Soft, nontender, Normal active bowel sounds, No hepatosplenomegaly or masses palpable  Ext: Peripheral pulses intact. No edema.  Neuro: Normal strength and tone, sensory exam grossly normal  Back:  Nontender currently to general palpation bilateral  paralumbar musculature. Equivocal right SLRT    Recent Labs   Lab Test 02/15/23  0817 02/06/23  0739 11/05/22  1305 02/11/22  0901 01/24/22  0731   HGB  --  13.8 13.4  --  15.0   PLT  --  290 359  --  325   NA  --  141  --   --  138   POTASSIUM 3.6 3.3*  --    < > 3.2*   CR  --  0.84  --   --  0.81   A1C  --  5.9*  --   --  6.0*    < > = values in this interval not displayed.        Diagnostics:  No labs were ordered during this visit.   No EKG required, no history of coronary heart disease, significant arrhythmia, peripheral arterial disease or other structural heart disease.    Revised Cardiac Risk Index (RCRI):  The patient has the following serious cardiovascular risks for perioperative complications:   - No serious cardiac risks = 0 points     RCRI Interpretation: 0 points: Class I (very low risk - 0.4% complication rate)           Signed Electronically by: Brian Rosenthal MD  Copy of this evaluation report is provided to requesting physician.      "

## 2023-04-11 ENCOUNTER — ANESTHESIA EVENT (OUTPATIENT)
Dept: SURGERY | Facility: CLINIC | Age: 68
End: 2023-04-11
Payer: COMMERCIAL

## 2023-04-11 RX ORDER — PHENOL 1.4 %
10 AEROSOL, SPRAY (ML) MUCOUS MEMBRANE
COMMUNITY
End: 2024-04-15

## 2023-04-11 RX ORDER — ACETAMINOPHEN 500 MG
500-1000 TABLET ORAL EVERY 6 HOURS PRN
COMMUNITY

## 2023-04-11 ASSESSMENT — LIFESTYLE VARIABLES: TOBACCO_USE: 1

## 2023-04-11 ASSESSMENT — COPD QUESTIONNAIRES: COPD: 0

## 2023-04-11 NOTE — ANESTHESIA PREPROCEDURE EVALUATION
Anesthesia Pre-Procedure Evaluation    Patient: Deven Hernandes   MRN: [id number] : 1955        Procedure : Procedure(s):  MINIMALLY INVASIVE RIGHT LUMBAR 3 TO LUMBAR 4 DISCECTOMY USING MICROSCOPE          Past Medical History:   Diagnosis Date     Esophageal reflux      Essential hypertension, benign      Hyperglycemia      Hyperlipidemia      Mumps      Tobacco use disorder     dced       Past Surgical History:   Procedure Laterality Date     COLONOSCOPY  2017     ESOPHAGOSCOPY, GASTROSCOPY, DUODENOSCOPY (EGD), COMBINED N/A 2018    Procedure: COMBINED ESOPHAGOSCOPY, GASTROSCOPY, DUODENOSCOPY (EGD), BIOPSY SINGLE OR MULTIPLE;  gastroscopy;  Surgeon: Stephen Skelton MD;  Location:  GI     TONSILLECTOMY      as a child     ZZC NONSPECIFIC PROCEDURE      tonsillectomy     ZZC NONSPECIFIC PROCEDURE      nl colonoscopy      Allergies   Allergen Reactions     Amoxicillin      Codeine      gi     Lansoprazole Diarrhea     Lisinopril Cough     Niacin Other (See Comments)     heartburn     Penicillins      Verapamil Other (See Comments)     Constipation        Social History     Tobacco Use     Smoking status: Former     Packs/day: 0.25     Years: 20.00     Pack years: 5.00     Types: Cigarettes     Quit date: 2003     Years since quittin.4     Smokeless tobacco: Never   Vaping Use     Vaping status: Never Used   Substance Use Topics     Alcohol use: Yes     Comment: socially      Wt Readings from Last 1 Encounters:   23 70.8 kg (156 lb)        Anesthesia Evaluation   Pt has had prior anesthetic. Type: General.    No history of anesthetic complications       ROS/MED HX  ENT/Pulmonary:     (+) tobacco use, Past use,  (-) asthma, COPD and sleep apnea   Neurologic:  - neg neurologic ROS     Cardiovascular:     (+) Dyslipidemia hypertension----- (-) CAD, CHF and arrhythmias   METS/Exercise Tolerance:     Hematologic:  - neg hematologic  ROS     Musculoskeletal:       GI/Hepatic:      (+) GERD,  (-) liver disease   Renal/Genitourinary:     (+) BPH,  (-) renal disease   Endo:    (-) Type I DM and Type II DM   Psychiatric/Substance Use:       Infectious Disease:       Malignancy:       Other:            Physical Exam    Airway        Mallampati: III   TM distance: > 3 FB   Neck ROM: full   Mouth opening: > 3 cm    Respiratory Devices and Support         Dental       (+) Completely normal teeth      Cardiovascular          Rhythm and rate: regular and normal     Pulmonary   pulmonary exam normal                OUTSIDE LABS:  CBC:   Lab Results   Component Value Date    WBC 7.7 02/06/2023    WBC 9.1 11/05/2022    HGB 13.8 02/06/2023    HGB 13.4 11/05/2022    HCT 40.5 02/06/2023    HCT 39.1 (L) 11/05/2022     02/06/2023     11/05/2022     BMP:   Lab Results   Component Value Date     02/06/2023     01/24/2022    POTASSIUM 3.6 02/15/2023    POTASSIUM 3.3 (L) 02/06/2023    CHLORIDE 104 02/06/2023    CHLORIDE 104 01/24/2022    CO2 23 02/06/2023    CO2 26 01/24/2022    BUN 16.1 02/06/2023    BUN 12 01/24/2022    CR 0.84 02/06/2023    CR 0.81 01/24/2022     (H) 02/06/2023     (H) 01/24/2022     COAGS: No results found for: PTT, INR, FIBR  POC: No results found for: BGM, HCG, HCGS  HEPATIC:   Lab Results   Component Value Date    ALBUMIN 4.6 02/06/2023    PROTTOTAL 6.8 02/06/2023    ALT 19 02/06/2023    AST 18 02/06/2023    ALKPHOS 74 02/06/2023    BILITOTAL 0.8 02/06/2023     OTHER:   Lab Results   Component Value Date    A1C 5.9 (H) 02/06/2023    KERRY 9.6 02/06/2023    MAG 2.4 (H) 08/20/2007    TSH 1.66 03/25/2003       Anesthesia Plan    ASA Status:  2   NPO Status:  NPO Appropriate    Anesthesia Type: General.     - Airway: ETT   Induction: Intravenous.   Maintenance: Balanced.        Consents    Anesthesia Plan(s) and associated risks, benefits, and realistic alternatives discussed. Questions answered and patient/representative(s) expressed understanding.    -  Discussed:     - Discussed with:  Patient      - Extended Intubation/Ventilatory Support Discussed: No.      - Patient is DNR/DNI Status: No    Use of blood products discussed: No .     Postoperative Care    Pain management: IV analgesics, Multi-modal analgesia.   PONV prophylaxis: Ondansetron (or other 5HT-3), Dexamethasone or Solumedrol, Background Propofol Infusion     Comments:                Drew Roman MD

## 2023-04-11 NOTE — PROGRESS NOTES
PTA medications updated by Medication Scribe prior to surgery via phone call with patient (last doses completed by Nurse)     Medication history sources: Patient, Surescripts and H&P  In the past week, patient estimated taking medication this percent of the time: Greater than 90%  Adherence assessment: N/A Not Observed    Significant changes made to the medication list:  None      Additional medication history information:   None    Medication reconciliation completed by provider prior to medication history? No    Time spent in this activity: 35 minutes    The information provided in this note is only as accurate as the sources available at the time of update(s)      Prior to Admission medications    Medication Sig Last Dose Taking? Auth Provider Long Term End Date   acetaminophen (TYLENOL) 500 MG tablet Take 500-1,000 mg by mouth every 6 hours as needed for mild pain Unknown at PRN Yes Reported, Patient     amLODIPine (NORVASC) 10 MG tablet Take 1 tablet (10 mg) by mouth daily  at AM Yes Garrett Clifford MD Yes    atorvastatin (LIPITOR) 10 MG tablet Take 1 tablet (10 mg) by mouth daily  at AM Yes Garrett Clifford MD Yes    losartan (COZAAR) 100 MG tablet Take 1 tablet (100 mg) by mouth daily  at AM Yes Garrett Clifford MD Yes    Melatonin 10 MG TABS tablet Take 10 mg by mouth nightly as needed for sleep  at HS Yes Reported, Patient     METAMUCIL 30.9 % OR POWD Take 1 Tablespoonful by mouth daily as needed 4/11/2023 at AM Yes      MULTIVITAMIN CHEW   OR Take 1 tablet by mouth daily 4/11/2023 at AM Yes      omeprazole (PRILOSEC) 20 MG CR capsule ONE DAILY 4/11/2023 at AM Yes Garrett Clifford MD     sildenafil (VIAGRA) 100 MG tablet Take 1 tablet (100 mg) by mouth daily as needed (sexual activity) Unknown at PRN Yes Phu Conteh MD Yes    tamsulosin (FLOMAX) 0.4 MG capsule Take 1 capsule (0.4 mg) by mouth daily 4/11/2023 at PM Yes Phu Conteh MD

## 2023-04-12 ENCOUNTER — HOSPITAL ENCOUNTER (OUTPATIENT)
Facility: CLINIC | Age: 68
Discharge: HOME | End: 2023-04-13
Attending: NEUROLOGICAL SURGERY | Admitting: NEUROLOGICAL SURGERY
Payer: COMMERCIAL

## 2023-04-12 ENCOUNTER — ANESTHESIA (OUTPATIENT)
Dept: SURGERY | Facility: CLINIC | Age: 68
End: 2023-04-12
Payer: COMMERCIAL

## 2023-04-12 ENCOUNTER — APPOINTMENT (OUTPATIENT)
Dept: GENERAL RADIOLOGY | Facility: CLINIC | Age: 68
End: 2023-04-12
Attending: NEUROLOGICAL SURGERY
Payer: COMMERCIAL

## 2023-04-12 DIAGNOSIS — M54.16 LUMBAR RADICULOPATHY: Primary | ICD-10-CM

## 2023-04-12 LAB — LACTATE SERPL-SCNC: 1 MMOL/L (ref 0.7–2)

## 2023-04-12 PROCEDURE — 250N000013 HC RX MED GY IP 250 OP 250 PS 637: Performed by: PHYSICIAN ASSISTANT

## 2023-04-12 PROCEDURE — 250N000009 HC RX 250: Performed by: NEUROLOGICAL SURGERY

## 2023-04-12 PROCEDURE — 250N000011 HC RX IP 250 OP 636: Performed by: NEUROLOGICAL SURGERY

## 2023-04-12 PROCEDURE — 250N000011 HC RX IP 250 OP 636: Performed by: PHYSICIAN ASSISTANT

## 2023-04-12 PROCEDURE — 370N000017 HC ANESTHESIA TECHNICAL FEE, PER MIN: Performed by: NEUROLOGICAL SURGERY

## 2023-04-12 PROCEDURE — 360N000084 HC SURGERY LEVEL 4 W/ FLUORO, PER MIN: Performed by: NEUROLOGICAL SURGERY

## 2023-04-12 PROCEDURE — 999N000141 HC STATISTIC PRE-PROCEDURE NURSING ASSESSMENT: Performed by: NEUROLOGICAL SURGERY

## 2023-04-12 PROCEDURE — 272N000001 HC OR GENERAL SUPPLY STERILE: Performed by: NEUROLOGICAL SURGERY

## 2023-04-12 PROCEDURE — 250N000005 HC OR RX SURGIFLO HEMOSTATIC MATRIX 10ML 199102S OPNP: Performed by: NEUROLOGICAL SURGERY

## 2023-04-12 PROCEDURE — 258N000003 HC RX IP 258 OP 636: Performed by: NURSE ANESTHETIST, CERTIFIED REGISTERED

## 2023-04-12 PROCEDURE — 63030 LAMOT DCMPRN NRV RT 1 LMBR: CPT | Mod: RT | Performed by: NEUROLOGICAL SURGERY

## 2023-04-12 PROCEDURE — 250N000009 HC RX 250: Performed by: NURSE ANESTHETIST, CERTIFIED REGISTERED

## 2023-04-12 PROCEDURE — 36415 COLL VENOUS BLD VENIPUNCTURE: CPT | Performed by: NEUROLOGICAL SURGERY

## 2023-04-12 PROCEDURE — 250N000011 HC RX IP 250 OP 636: Performed by: NURSE ANESTHETIST, CERTIFIED REGISTERED

## 2023-04-12 PROCEDURE — 999N000179 XR SURGERY CARM FLUORO LESS THAN 5 MIN W STILLS

## 2023-04-12 PROCEDURE — 250N000025 HC SEVOFLURANE, PER MIN: Performed by: NEUROLOGICAL SURGERY

## 2023-04-12 PROCEDURE — 83605 ASSAY OF LACTIC ACID: CPT | Performed by: NEUROLOGICAL SURGERY

## 2023-04-12 PROCEDURE — 710N000009 HC RECOVERY PHASE 1, LEVEL 1, PER MIN: Performed by: NEUROLOGICAL SURGERY

## 2023-04-12 PROCEDURE — 99222 1ST HOSP IP/OBS MODERATE 55: CPT | Performed by: PHYSICIAN ASSISTANT

## 2023-04-12 RX ORDER — DEXAMETHASONE SODIUM PHOSPHATE 4 MG/ML
INJECTION, SOLUTION INTRA-ARTICULAR; INTRALESIONAL; INTRAMUSCULAR; INTRAVENOUS; SOFT TISSUE PRN
Status: DISCONTINUED | OUTPATIENT
Start: 2023-04-12 | End: 2023-04-12

## 2023-04-12 RX ORDER — HYDROMORPHONE HCL IN WATER/PF 6 MG/30 ML
0.2 PATIENT CONTROLLED ANALGESIA SYRINGE INTRAVENOUS
Status: DISCONTINUED | OUTPATIENT
Start: 2023-04-12 | End: 2023-04-13 | Stop reason: HOSPADM

## 2023-04-12 RX ORDER — LOSARTAN POTASSIUM 100 MG/1
100 TABLET ORAL DAILY
Status: DISCONTINUED | OUTPATIENT
Start: 2023-04-13 | End: 2023-04-13 | Stop reason: HOSPADM

## 2023-04-12 RX ORDER — ACETAMINOPHEN 325 MG/1
650 TABLET ORAL EVERY 4 HOURS PRN
Status: DISCONTINUED | OUTPATIENT
Start: 2023-04-15 | End: 2023-04-13 | Stop reason: HOSPADM

## 2023-04-12 RX ORDER — FENTANYL CITRATE 50 UG/ML
INJECTION, SOLUTION INTRAMUSCULAR; INTRAVENOUS PRN
Status: DISCONTINUED | OUTPATIENT
Start: 2023-04-12 | End: 2023-04-12

## 2023-04-12 RX ORDER — CEFAZOLIN SODIUM 1 G/3ML
1 INJECTION, POWDER, FOR SOLUTION INTRAMUSCULAR; INTRAVENOUS EVERY 8 HOURS
Status: DISCONTINUED | OUTPATIENT
Start: 2023-04-12 | End: 2023-04-13 | Stop reason: HOSPADM

## 2023-04-12 RX ORDER — LIDOCAINE HYDROCHLORIDE 20 MG/ML
INJECTION, SOLUTION INFILTRATION; PERINEURAL PRN
Status: DISCONTINUED | OUTPATIENT
Start: 2023-04-12 | End: 2023-04-12

## 2023-04-12 RX ORDER — AMLODIPINE BESYLATE 10 MG/1
10 TABLET ORAL DAILY
Status: DISCONTINUED | OUTPATIENT
Start: 2023-04-13 | End: 2023-04-13 | Stop reason: HOSPADM

## 2023-04-12 RX ORDER — METHOCARBAMOL 500 MG/1
500 TABLET, FILM COATED ORAL EVERY 6 HOURS PRN
Status: DISCONTINUED | OUTPATIENT
Start: 2023-04-12 | End: 2023-04-13 | Stop reason: HOSPADM

## 2023-04-12 RX ORDER — AMOXICILLIN 250 MG
1 CAPSULE ORAL 2 TIMES DAILY
Status: DISCONTINUED | OUTPATIENT
Start: 2023-04-12 | End: 2023-04-13 | Stop reason: HOSPADM

## 2023-04-12 RX ORDER — ATORVASTATIN CALCIUM 10 MG/1
10 TABLET, FILM COATED ORAL DAILY
Status: DISCONTINUED | OUTPATIENT
Start: 2023-04-13 | End: 2023-04-13 | Stop reason: HOSPADM

## 2023-04-12 RX ORDER — GABAPENTIN 100 MG/1
100 CAPSULE ORAL
Status: COMPLETED | OUTPATIENT
Start: 2023-04-12 | End: 2023-04-12

## 2023-04-12 RX ORDER — POLYETHYLENE GLYCOL 3350 17 G/17G
17 POWDER, FOR SOLUTION ORAL DAILY
Status: DISCONTINUED | OUTPATIENT
Start: 2023-04-13 | End: 2023-04-13 | Stop reason: HOSPADM

## 2023-04-12 RX ORDER — NALOXONE HYDROCHLORIDE 0.4 MG/ML
0.4 INJECTION, SOLUTION INTRAMUSCULAR; INTRAVENOUS; SUBCUTANEOUS
Status: DISCONTINUED | OUTPATIENT
Start: 2023-04-12 | End: 2023-04-13 | Stop reason: HOSPADM

## 2023-04-12 RX ORDER — CEFAZOLIN SODIUM/WATER 2 G/20 ML
2 SYRINGE (ML) INTRAVENOUS
Status: COMPLETED | OUTPATIENT
Start: 2023-04-12 | End: 2023-04-12

## 2023-04-12 RX ORDER — LIDOCAINE 40 MG/G
CREAM TOPICAL
Status: DISCONTINUED | OUTPATIENT
Start: 2023-04-12 | End: 2023-04-13 | Stop reason: HOSPADM

## 2023-04-12 RX ORDER — KETAMINE HYDROCHLORIDE 10 MG/ML
INJECTION INTRAMUSCULAR; INTRAVENOUS PRN
Status: DISCONTINUED | OUTPATIENT
Start: 2023-04-12 | End: 2023-04-12

## 2023-04-12 RX ORDER — NALOXONE HYDROCHLORIDE 0.4 MG/ML
0.2 INJECTION, SOLUTION INTRAMUSCULAR; INTRAVENOUS; SUBCUTANEOUS
Status: DISCONTINUED | OUTPATIENT
Start: 2023-04-12 | End: 2023-04-13 | Stop reason: HOSPADM

## 2023-04-12 RX ORDER — PROPOFOL 10 MG/ML
INJECTION, EMULSION INTRAVENOUS CONTINUOUS PRN
Status: DISCONTINUED | OUTPATIENT
Start: 2023-04-12 | End: 2023-04-12

## 2023-04-12 RX ORDER — ONDANSETRON 2 MG/ML
INJECTION INTRAMUSCULAR; INTRAVENOUS PRN
Status: DISCONTINUED | OUTPATIENT
Start: 2023-04-12 | End: 2023-04-12

## 2023-04-12 RX ORDER — BISACODYL 10 MG
10 SUPPOSITORY, RECTAL RECTAL DAILY PRN
Status: DISCONTINUED | OUTPATIENT
Start: 2023-04-12 | End: 2023-04-13 | Stop reason: HOSPADM

## 2023-04-12 RX ORDER — PROPOFOL 10 MG/ML
INJECTION, EMULSION INTRAVENOUS PRN
Status: DISCONTINUED | OUTPATIENT
Start: 2023-04-12 | End: 2023-04-12

## 2023-04-12 RX ORDER — EPHEDRINE SULFATE 50 MG/ML
INJECTION, SOLUTION INTRAMUSCULAR; INTRAVENOUS; SUBCUTANEOUS PRN
Status: DISCONTINUED | OUTPATIENT
Start: 2023-04-12 | End: 2023-04-12

## 2023-04-12 RX ORDER — SODIUM CHLORIDE, SODIUM LACTATE, POTASSIUM CHLORIDE, CALCIUM CHLORIDE 600; 310; 30; 20 MG/100ML; MG/100ML; MG/100ML; MG/100ML
INJECTION, SOLUTION INTRAVENOUS CONTINUOUS PRN
Status: DISCONTINUED | OUTPATIENT
Start: 2023-04-12 | End: 2023-04-12

## 2023-04-12 RX ORDER — TAMSULOSIN HYDROCHLORIDE 0.4 MG/1
0.4 CAPSULE ORAL DAILY
Status: DISCONTINUED | OUTPATIENT
Start: 2023-04-12 | End: 2023-04-13 | Stop reason: HOSPADM

## 2023-04-12 RX ORDER — CEFAZOLIN SODIUM/WATER 2 G/20 ML
2 SYRINGE (ML) INTRAVENOUS SEE ADMIN INSTRUCTIONS
Status: DISCONTINUED | OUTPATIENT
Start: 2023-04-12 | End: 2023-04-12 | Stop reason: HOSPADM

## 2023-04-12 RX ORDER — ACETAMINOPHEN 325 MG/1
975 TABLET ORAL EVERY 8 HOURS
Status: DISCONTINUED | OUTPATIENT
Start: 2023-04-12 | End: 2023-04-13 | Stop reason: HOSPADM

## 2023-04-12 RX ORDER — OXYCODONE HYDROCHLORIDE 5 MG/1
10 TABLET ORAL EVERY 4 HOURS PRN
Status: DISCONTINUED | OUTPATIENT
Start: 2023-04-12 | End: 2023-04-13 | Stop reason: HOSPADM

## 2023-04-12 RX ORDER — HYDROMORPHONE HCL IN WATER/PF 6 MG/30 ML
0.4 PATIENT CONTROLLED ANALGESIA SYRINGE INTRAVENOUS
Status: DISCONTINUED | OUTPATIENT
Start: 2023-04-12 | End: 2023-04-13 | Stop reason: HOSPADM

## 2023-04-12 RX ORDER — OXYCODONE HYDROCHLORIDE 5 MG/1
5 TABLET ORAL EVERY 4 HOURS PRN
Status: DISCONTINUED | OUTPATIENT
Start: 2023-04-12 | End: 2023-04-13 | Stop reason: HOSPADM

## 2023-04-12 RX ADMIN — ACETAMINOPHEN 975 MG: 325 TABLET ORAL at 21:21

## 2023-04-12 RX ADMIN — GABAPENTIN 100 MG: 100 CAPSULE ORAL at 11:24

## 2023-04-12 RX ADMIN — ROCURONIUM BROMIDE 50 MG: 50 INJECTION, SOLUTION INTRAVENOUS at 13:23

## 2023-04-12 RX ADMIN — SENNOSIDES AND DOCUSATE SODIUM 1 TABLET: 50; 8.6 TABLET ORAL at 21:22

## 2023-04-12 RX ADMIN — Medication 5 MG: at 14:25

## 2023-04-12 RX ADMIN — MIDAZOLAM 2 MG: 1 INJECTION INTRAMUSCULAR; INTRAVENOUS at 13:15

## 2023-04-12 RX ADMIN — PHENYLEPHRINE HYDROCHLORIDE 100 MCG: 10 INJECTION INTRAVENOUS at 13:46

## 2023-04-12 RX ADMIN — OXYCODONE HYDROCHLORIDE 5 MG: 5 TABLET ORAL at 21:22

## 2023-04-12 RX ADMIN — PROPOFOL 20 MCG/KG/MIN: 10 INJECTION, EMULSION INTRAVENOUS at 13:35

## 2023-04-12 RX ADMIN — PHENYLEPHRINE HYDROCHLORIDE 0.3 MCG/KG/MIN: 10 INJECTION INTRAVENOUS at 13:35

## 2023-04-12 RX ADMIN — Medication 20 MG: at 13:42

## 2023-04-12 RX ADMIN — CEFAZOLIN 1 G: 1 INJECTION, POWDER, FOR SOLUTION INTRAMUSCULAR; INTRAVENOUS at 21:22

## 2023-04-12 RX ADMIN — HYDROMORPHONE HYDROCHLORIDE 0.5 MG: 1 INJECTION, SOLUTION INTRAMUSCULAR; INTRAVENOUS; SUBCUTANEOUS at 15:32

## 2023-04-12 RX ADMIN — ONDANSETRON 4 MG: 2 INJECTION INTRAMUSCULAR; INTRAVENOUS at 15:39

## 2023-04-12 RX ADMIN — SUGAMMADEX 200 MG: 100 INJECTION, SOLUTION INTRAVENOUS at 15:41

## 2023-04-12 RX ADMIN — DEXAMETHASONE SODIUM PHOSPHATE 4 MG: 4 INJECTION, SOLUTION INTRA-ARTICULAR; INTRALESIONAL; INTRAMUSCULAR; INTRAVENOUS; SOFT TISSUE at 13:41

## 2023-04-12 RX ADMIN — SODIUM CHLORIDE, POTASSIUM CHLORIDE, SODIUM LACTATE AND CALCIUM CHLORIDE: 600; 310; 30; 20 INJECTION, SOLUTION INTRAVENOUS at 13:15

## 2023-04-12 RX ADMIN — SODIUM CHLORIDE, POTASSIUM CHLORIDE, SODIUM LACTATE AND CALCIUM CHLORIDE: 600; 310; 30; 20 INJECTION, SOLUTION INTRAVENOUS at 15:06

## 2023-04-12 RX ADMIN — Medication 10 MG: at 13:53

## 2023-04-12 RX ADMIN — ROCURONIUM BROMIDE 10 MG: 50 INJECTION, SOLUTION INTRAVENOUS at 14:35

## 2023-04-12 RX ADMIN — FENTANYL CITRATE 100 MCG: 50 INJECTION, SOLUTION INTRAMUSCULAR; INTRAVENOUS at 13:22

## 2023-04-12 RX ADMIN — LIDOCAINE HYDROCHLORIDE 100 MG: 20 INJECTION, SOLUTION INFILTRATION; PERINEURAL at 13:22

## 2023-04-12 RX ADMIN — Medication 5 MG: at 14:11

## 2023-04-12 RX ADMIN — TAMSULOSIN HYDROCHLORIDE 0.4 MG: 0.4 CAPSULE ORAL at 21:22

## 2023-04-12 RX ADMIN — Medication 2 G: at 13:22

## 2023-04-12 RX ADMIN — Medication 10 MG: at 14:47

## 2023-04-12 RX ADMIN — PROPOFOL 180 MG: 10 INJECTION, EMULSION INTRAVENOUS at 13:22

## 2023-04-12 ASSESSMENT — ACTIVITIES OF DAILY LIVING (ADL)
ADLS_ACUITY_SCORE: 35
ADLS_ACUITY_SCORE: 37
ADLS_ACUITY_SCORE: 35
ADLS_ACUITY_SCORE: 33
ADLS_ACUITY_SCORE: 35

## 2023-04-12 ASSESSMENT — ENCOUNTER SYMPTOMS: DYSRHYTHMIAS: 0

## 2023-04-12 NOTE — ANESTHESIA POSTPROCEDURE EVALUATION
Patient: Deven Hernandes    Procedure: Procedure(s):  MINIMALLY INVASIVE RIGHT LUMBAR 3 TO LUMBAR 4 DISCECTOMY USING MICROSCOPE       Anesthesia Type:  General    Note:  Disposition: Outpatient   Postop Pain Control: Uneventful            Sign Out: Well controlled pain   PONV: No   Neuro/Psych: Uneventful            Sign Out: Acceptable/Baseline neuro status   Airway/Respiratory: Uneventful            Sign Out: Acceptable/Baseline resp. status   CV/Hemodynamics: Uneventful            Sign Out: Acceptable CV status; No obvious hypovolemia; No obvious fluid overload   Other NRE: NONE   DID A NON-ROUTINE EVENT OCCUR? No           Last vitals:  Vitals Value Taken Time   /70 04/12/23 1715   Temp 36.7  C (98  F) 04/12/23 1645   Pulse 86 04/12/23 1718   Resp 17 04/12/23 1718   SpO2 93 % 04/12/23 1718   Vitals shown include unvalidated device data.    Electronically Signed By: Drew Roman MD  April 12, 2023  6:16 PM

## 2023-04-12 NOTE — BRIEF OP NOTE
[facility]    Brief Operative Note    Pre-operative diagnosis: Lumbar radiculopathy [M54.16]  Post-operative diagnosis Same as pre-operative diagnosis    Procedure: Procedure(s):  MINIMALLY INVASIVE RIGHT LUMBAR 3 TO LUMBAR 4 DISCECTOMY USING MICROSCOPE  Surgeon: Surgeon(s) and Role:     * Tip Jeffries MD - Primary  Anesthesia: General   Estimated Blood Loss: 10 mL from 4/12/2023  1:21 PM to 4/12/2023  3:58 PM      Drains:  10mL  Specimens: * No specimens in log *  Findings:   None.  Complications: None.  Implants: * No implants in log *

## 2023-04-12 NOTE — OR NURSING
Dr Jeffries was paged regarding ordered [facility] antibiotic and patient's allergy to Amoxicillin/Penicillin. Asked surgeon if he wanted either a test dose or changed in antibiotic.  Surgeon texted back that there is low cross reaction between Cephalosporin and Penicillin. No test dose recommended.

## 2023-04-12 NOTE — INTERVAL H&P NOTE
"I have reviewed the surgical (or preoperative) H&P that is linked to this encounter, and examined the patient. There are no significant changes    Clinical Conditions Present on Arrival:  Clinically Significant Risk Factors Present on Admission                    # Overweight: Estimated body mass index is 27.28 kg/m  as calculated from the following:    Height as of this encounter: 1.6 m (5' 3\").    Weight as of this encounter: 69.9 kg (154 lb).       "

## 2023-04-12 NOTE — ANESTHESIA PROCEDURE NOTES
Airway       Patient location during procedure: OR       Procedure Start/Stop Times: 4/12/2023 1:26 PM  Staff -        Anesthesiologist:  Drew Roman MD       CRNA: Haydee Lopez APRN CRNA       Performed By: CRNA  Consent for Airway        Urgency: elective  Indications and Patient Condition       Indications for airway management: anam-procedural       Induction type:intravenous       Mask difficulty assessment: 2 - vent by mask + OA or adjuvant +/- NMBA    Final Airway Details       Final airway type: endotracheal airway       Successful airway: ETT - single  Endotracheal Airway Details        ETT size (mm): 7.0       Cuffed: yes       Successful intubation technique: video laryngoscopy       VL Blade Size: Glidescope 4       Grade View of Cords: 2       Adjucts: stylet       Position: Right       Measured from: lips       Secured at (cm): 22       Bite block used: None    Post intubation assessment        Placement verified by: capnometry, equal breath sounds and chest rise        Number of attempts at approach: 2 (1st attempt with Lopez 2, grade 3 view)       Secured with: pink tape       Ease of procedure: easy       Dentition: Intact and Unchanged    Medication(s) Administered   Medication Administration Time: 4/12/2023 1:26 PM

## 2023-04-12 NOTE — PROVIDER NOTIFICATION
"MD Notification    Notified Person: MD    Notified Person Name: Patti Mcghee PA    Notification Date/Time: 4/12/23 6282    Notification Interaction: Pager    Purpose of Notification: FYI sepsis protocol fired for new patient post L3-L4 microdisectomy. Scored 6. Lactic acid ordered and monitoring vitals.    Orders Received: Repage with Lactic acid level.    Comments: Repaged @8153 \"Lactic acid is 1.0  VSS\"    "

## 2023-04-12 NOTE — BRIEF OP NOTE
[facility]    Brief Operative Note    Pre-operative diagnosis: Lumbar radiculopathy [M54.16]  Post-operative diagnosis Same as pre-operative diagnosis    Procedure: Procedure(s):  MINIMALLY INVASIVE RIGHT LUMBAR 3 TO LUMBAR 4 DISCECTOMY USING MICROSCOPE  Surgeon: Surgeon(s) and Role:     * Tip Jeffries MD - Primary  Anesthesia: General   Estimated Blood Loss: Less than 10 ml    Drains: None  Specimens: * No specimens in log *  Findings:   None. Satisfactory central and right L3-4 foraminal decompression achieved.  Complications: None.  Implants: * No implants in log *

## 2023-04-12 NOTE — OP NOTE
Date of surgery: April 12, 2023    Surgeon: Tip Jeffries MD  Assistant: LESLEY Coats (Note: The assistant was present for and assisted with the entire surgery, and his/her role as an assistant was crucial for aid in positioning, exposure, suctioning, retraction, and closure)    Preoperative diagnosis: L3-4 Lumbar disc herniation  Postoperative diagnosis: L3-4 Lumbar disc herniation    Procedure:  1.  Right L3-4 MIS microdiscectomy  2.  Use of Medtronic Metrx minimally invasive system (18 mm X 50 mm)  3.  Use of intraoperative microscope and fluoroscopy    EBL: 10 mL    Indications: Mr. Hernandes is a 67-year-old right-handed male who presented with a long history of low back pain for more than a year with recent worsening for last couple of months.  He also reports pain in his right leg for last 2 months along the L4 dermatome.  He also reported weakness in the form of difficulty in clearing the ground while walking.His MRI showed right L3-4 paracentral disc extrusion with compression of the traversing L4 nerve root.  Given that his clinical and imaging findings are concordant, I recommend right laminectomy and microdiscectomy for the relief of symptoms.  I discussed with the patient regarding the procedure and the risk associated with  the procedure including but not limited to bleeding, infection, injury to the thecal sac, injury to the nerve root, CSF leak, need for additional procedure and others.  I also discussed with the patient the option of conservative management, however given that patient has weakness and ongoing compression of the nerve root conservative management is unlikely to relieve his pain in short duration. After discussing the risks and benefits of the surgery, patient wished to proceed.    Description of surgery:   The patient was positioned prone.  Sterile prepping and draping procedures were performed.  Antibiotics were administered and timeout was performed.  A paramedian lumbar incision  was performed ~ 15 mm off the midline..  The minimally invasive dilating system was used to dock on the hemilamina.  The microscope was brought into the field, and under microscopy, L3-4 laminotomies and medial facetectomy were performed with the high speed drill.  The kerrison rongeur was used to remove the ligamentum flavum, and the thecal sac and nerve root were exposed.  The nerve root was retracted medially, and the exiting/traversing nerve roots were adequately decompressed. No free disc fragment was noted at L3-4 disc space and behind the L4 vertebral body following multiple  attempts of exploration using nerve hook.  I also confirmed the level multiple times using intraoperative fluoroscopy.  I then explored the L3-4 disc space after opening the annulus and any loose fragment of disc within the space was removed using pituitary rongeurs.  There was no dural tear or CSF leak.    Following satisfactory decompression,  hemostasis was achieved and antibiotic irrigation was performed.  The fascia and the dermal layer was closed with 2-0 vicryl sutures.  The skin was closed with a running subcuticular stitch and Dermabond.  There were no intraprocedural complications. Patient was transferred to the PACU in stable condition. I updated the family after the surgery.

## 2023-04-12 NOTE — OR NURSING
Hand-off report given to RN.  To 2416-1 per cart with all belongings.  Will transport with Capnography monitoring.  Will notify wife, Nicolle of transfer.

## 2023-04-12 NOTE — ANESTHESIA CARE TRANSFER NOTE
Patient: Deven Hernandes    Procedure: Procedure(s):  MINIMALLY INVASIVE RIGHT LUMBAR 3 TO LUMBAR 4 DISCECTOMY USING MICROSCOPE       Diagnosis: Lumbar radiculopathy [M54.16]  Diagnosis Additional Information: No value filed.    Anesthesia Type:   General     Note:    Oropharynx: oropharynx clear of all foreign objects  Level of Consciousness: awake  Oxygen Supplementation: face mask  Level of Supplemental Oxygen (L/min / FiO2): 6  Independent Airway: airway patency satisfactory and stable  Dentition: dentition unchanged  Vital Signs Stable: post-procedure vital signs reviewed and stable  Report to RN Given: handoff report given  Patient transferred to: PACU    Handoff Report: Identifed the Patient, Identified the Reponsible Provider, Reviewed the pertinent medical history, Discussed the surgical course, Reviewed Intra-OP anesthesia mangement and issues during anesthesia, Set expectations for post-procedure period and Allowed opportunity for questions and acknowledgement of understanding      Vitals:  Vitals Value Taken Time   BP     Temp     Pulse     Resp     SpO2         Electronically Signed By: RADHA Caldera CRNA  April 12, 2023  3:52 PM

## 2023-04-13 ENCOUNTER — APPOINTMENT (OUTPATIENT)
Dept: PHYSICAL THERAPY | Facility: CLINIC | Age: 68
End: 2023-04-13
Attending: PHYSICIAN ASSISTANT
Payer: COMMERCIAL

## 2023-04-13 VITALS
OXYGEN SATURATION: 93 % | HEIGHT: 63 IN | BODY MASS INDEX: 27.29 KG/M2 | SYSTOLIC BLOOD PRESSURE: 120 MMHG | TEMPERATURE: 98.2 F | WEIGHT: 154 LBS | DIASTOLIC BLOOD PRESSURE: 77 MMHG | HEART RATE: 92 BPM | RESPIRATION RATE: 16 BRPM

## 2023-04-13 LAB
ANION GAP SERPL CALCULATED.3IONS-SCNC: 12 MMOL/L (ref 7–15)
BUN SERPL-MCNC: 9.7 MG/DL (ref 8–23)
CALCIUM SERPL-MCNC: 8.9 MG/DL (ref 8.8–10.2)
CHLORIDE SERPL-SCNC: 104 MMOL/L (ref 98–107)
CREAT SERPL-MCNC: 0.87 MG/DL (ref 0.67–1.17)
DEPRECATED HCO3 PLAS-SCNC: 24 MMOL/L (ref 22–29)
GFR SERPL CREATININE-BSD FRML MDRD: >90 ML/MIN/1.73M2
GLUCOSE SERPL-MCNC: 124 MG/DL (ref 70–99)
POTASSIUM SERPL-SCNC: 3.4 MMOL/L (ref 3.4–5.3)
SODIUM SERPL-SCNC: 140 MMOL/L (ref 136–145)

## 2023-04-13 PROCEDURE — 99231 SBSQ HOSP IP/OBS SF/LOW 25: CPT | Performed by: HOSPITALIST

## 2023-04-13 PROCEDURE — 250N000011 HC RX IP 250 OP 636: Performed by: PHYSICIAN ASSISTANT

## 2023-04-13 PROCEDURE — 97161 PT EVAL LOW COMPLEX 20 MIN: CPT | Mod: GP | Performed by: PHYSICAL THERAPIST

## 2023-04-13 PROCEDURE — 82310 ASSAY OF CALCIUM: CPT | Performed by: PHYSICIAN ASSISTANT

## 2023-04-13 PROCEDURE — 250N000013 HC RX MED GY IP 250 OP 250 PS 637: Performed by: PHYSICIAN ASSISTANT

## 2023-04-13 PROCEDURE — 36415 COLL VENOUS BLD VENIPUNCTURE: CPT | Performed by: PHYSICIAN ASSISTANT

## 2023-04-13 RX ORDER — METHOCARBAMOL 500 MG/1
500 TABLET, FILM COATED ORAL EVERY 6 HOURS PRN
Qty: 40 TABLET | Refills: 0 | Status: SHIPPED | OUTPATIENT
Start: 2023-04-13 | End: 2023-06-22

## 2023-04-13 RX ORDER — AMOXICILLIN 250 MG
1 CAPSULE ORAL 2 TIMES DAILY
Qty: 30 TABLET | Refills: 0 | Status: SHIPPED | OUTPATIENT
Start: 2023-04-13 | End: 2023-06-22

## 2023-04-13 RX ORDER — OXYCODONE HYDROCHLORIDE 5 MG/1
5 TABLET ORAL EVERY 4 HOURS PRN
Qty: 40 TABLET | Refills: 0 | Status: SHIPPED | OUTPATIENT
Start: 2023-04-13 | End: 2023-06-22

## 2023-04-13 RX ADMIN — CEFAZOLIN 1 G: 1 INJECTION, POWDER, FOR SOLUTION INTRAMUSCULAR; INTRAVENOUS at 04:57

## 2023-04-13 RX ADMIN — SENNOSIDES AND DOCUSATE SODIUM 1 TABLET: 50; 8.6 TABLET ORAL at 09:46

## 2023-04-13 RX ADMIN — ACETAMINOPHEN 975 MG: 325 TABLET ORAL at 13:25

## 2023-04-13 RX ADMIN — ACETAMINOPHEN 975 MG: 325 TABLET ORAL at 04:57

## 2023-04-13 RX ADMIN — POLYETHYLENE GLYCOL 3350 17 G: 17 POWDER, FOR SOLUTION ORAL at 09:46

## 2023-04-13 ASSESSMENT — ACTIVITIES OF DAILY LIVING (ADL)
ADLS_ACUITY_SCORE: 39
ADLS_ACUITY_SCORE: 37
ADLS_ACUITY_SCORE: 39
ADLS_ACUITY_SCORE: 37
ADLS_ACUITY_SCORE: 37
ADLS_ACUITY_SCORE: 39
ADLS_ACUITY_SCORE: 39

## 2023-04-13 NOTE — PROGRESS NOTES
Pt a/o,upwalking,vss.dressing changed no drainage.voiding minimal pain.dced with paperwork belongings and meds with wife.

## 2023-04-13 NOTE — CONSULTS
"[facility]  Consult Note - Hospitalist Service  Date of Admission:  4/12/2023  Consult Requested by: Patti Taylor PA-C  Reason for Consult: Sepsis BPA fired     Assessment & Plan   Deven Hernandes is a 67 year old male with PMHx of HTN, hyperlipidemia, BPH, GERD, and diet controlled T2DM (A1C 5.9) admitted on 4/12/2023. He underwent L3-4 MIS microdiscectomy with Dr. Jeffries. Hospitalist service was consulted 4/13 evening due to the sepsis BPA firing. Lactic WNL.     + Sepsis BPA: See separate sepsis note. Lactic WNL. No evidence of infection. Vitals stable, slightly softer SBP 98 in the post-op period in the setting of taking both antihypertensives the AM of admission in combination with volume loss related to surgery, anesthesia, pain medications. Asymptomatic.   - CBC and BMP in the AM, no acute work-up indicated:     Hypertension: Continue Norvasc. Hold Losartan, monitor BP trends and AM BMP and resume as appropriate in the AM.     Hyperlipidemia: Continue PTA statin.     GERD: Continue PPI     BPH: Continue PTA Flomax, though cautious use with softer BPs, hold parameters placed.      The patient's care was discussed with the Attending Physician, Dr. Moore and Patient.    Clinically Significant Risk Factors Present on Admission                  # Hypertension: home medication list includes antihypertensive(s)      # Overweight: Estimated body mass index is 27.28 kg/m  as calculated from the following:    Height as of this encounter: 1.6 m (5' 3\").    Weight as of this encounter: 69.9 kg (154 lb).           Kelle Kramer PA-C  Hospitalist Service  Securely message with [employer] (more info)  Text page via [facility] Paging/Directory   ______________________________________________________________________    Chief Complaint   Back pain    History is obtained from the patient    History of Present Illness   Deven Hernandes is a 67 year old male with PMHx of HTN, " hyperlipidemia, BPH, GERD, and diet controlled T2DM (A1C 5.9) admitted on 4/12/2023. He underwent L3-4 MIS microdiscectomy with Dr. Jeffries. Hospitalist service was consulted 4/13 evening due to the sepsis BPA firing. Lactic WNL.     See separate sepsis note. Lactic WNL. No evidence of infection. Vitals stable, slightly softer SBP 98 in the post-op period in the setting of taking both antihypertensives the AM of admission in combination with volume loss related to surgery, anesthesia, pain medications. Asymptomatic.     Pt resting comfortably. Pain well managed. Baseline right foot numbness. Urinating well. Passing flatus. Denies chest pain, SOB, dizziness, lightheadedness. No N/V/D. No URI sx.     Past Medical History    Past Medical History:   Diagnosis Date     Esophageal reflux      Essential hypertension, benign      Hyperglycemia      Hyperlipidemia      Mumps      Tobacco use disorder     dced 2003       Past Surgical History   Past Surgical History:   Procedure Laterality Date     COLONOSCOPY  04/19/2017     ESOPHAGOSCOPY, GASTROSCOPY, DUODENOSCOPY (EGD), COMBINED N/A 07/31/2018    Procedure: COMBINED ESOPHAGOSCOPY, GASTROSCOPY, DUODENOSCOPY (EGD), BIOPSY SINGLE OR MULTIPLE;  gastroscopy;  Surgeon: Stephen Skelton MD;  Location:  GI     TONSILLECTOMY      as a child     [facility] NONSPECIFIC PROCEDURE      tonsillectomy     [facility] NONSPECIFIC PROCEDURE  2001    nl colonoscopy       Medications   Medications Prior to Admission   Medication Sig Dispense Refill Last Dose     acetaminophen (TYLENOL) 500 MG tablet Take 500-1,000 mg by mouth every 6 hours as needed for mild pain   Unknown at PRN     amLODIPine (NORVASC) 10 MG tablet Take 1 tablet (10 mg) by mouth daily 90 tablet 2 4/12/2023 at AM     atorvastatin (LIPITOR) 10 MG tablet Take 1 tablet (10 mg) by mouth daily 90 tablet 2 4/12/2023 at AM     losartan (COZAAR) 100 MG tablet Take 1 tablet (100 mg) by mouth daily 90 tablet 1 4/12/2023 at AM     Melatonin 10 MG  TABS tablet Take 10 mg by mouth nightly as needed for sleep   2023 at HS     METAMUCIL 30.9 % OR POWD Take 1 Tablespoonful by mouth daily as needed 0 0 2023 at AM     MULTIVITAMIN CHEW   OR Take 1 tablet by mouth daily   2023 at AM     omeprazole (PRILOSEC) 20 MG CR capsule ONE DAILY 90 capsule 3 2023 at AM     sildenafil (VIAGRA) 100 MG tablet Take 1 tablet (100 mg) by mouth daily as needed (sexual activity) 30 tablet 3 Unknown at PRN     tamsulosin (FLOMAX) 0.4 MG capsule Take 1 capsule (0.4 mg) by mouth daily 90 capsule 3 2023 at PM      Review of Systems    The 10 point Review of Systems is negative other than noted in the HPI.    Social History   I have reviewed this patient's social history and updated it with pertinent information if needed.  Social History     Tobacco Use     Smoking status: Former     Packs/day: 0.25     Years: 20.00     Pack years: 5.00     Types: Cigarettes     Quit date: 2003     Years since quittin.4     Smokeless tobacco: Never   Vaping Use     Vaping status: Never Used   Substance Use Topics     Alcohol use: Yes     Comment: socially     Drug use: No       Family History   I have reviewed this patient's family history and updated it with pertinent information if needed.  Family History   Problem Relation Age of Onset     Respiratory Mother         b:1926   emphysema, ?heart problems, HTN     Hypertension Mother      Cerebrovascular Disease Mother      Cancer Father         d:age 82   colon cancer     Colon Cancer Father      Family History Negative Brother         b:1948     Diabetes Brother      Hypertension Brother      Arthritis Sister         b:1950   unsure of type     Diabetes Cousin        Allergies   Allergies   Allergen Reactions     Amoxicillin Itching and Swelling     Codeine      gi     Lansoprazole Diarrhea     Lisinopril Cough     Niacin Other (See Comments)     heartburn     Penicillins      Verapamil Other (See Comments)      Constipation          Physical Exam   Vital Signs: Temp: 97.3  F (36.3  C) Temp src: Oral BP: 98/64 Pulse: 93   Resp: 18 SpO2: 90 % O2 Device: None (Room air) Oxygen Delivery: 8 LPM  Weight: 154 lbs 0 oz    CONSTITUTIONAL: Pt laying in bed, dressed in hospital garb. Appears comfortable. Cooperative with interview.  HEENT: Normocephalic, atraumatic.   CARDIOVASCULAR: RRR, no murmurs, rubs, or extra heart sounds appreciated. Pulses +2/4 and regular in upper and lower extremities, bilaterally.   RESPIRATORY: No increased work of breathing.  CTA, bilat; no wheezes, rales, or rhonchi appreciated.  GASTROINTESTINAL:  Abdomen soft, non-distended. BS auscultated in all four quadrants. Negative for tenderness to palpation.  No masses or organomegaly noted.  MUSCULOSKELETAL: No gross deformities noted. Normal muscle tone.   HEMATOLOGIC/LYMPHATIC/IMMUNOLOGIC: Negative for lower extremity edema, bilaterally.  NEUROLOGIC: Alert and oriented to person, place, and time. Baseline right foot numbness.   SKIN: Warm, dry, intact.      Medical Decision Making       35 MINUTES SPENT BY ME on the date of service doing chart review, history, exam, documentation & further activities per the note.      Data     I have personally reviewed the following data over the past 24 hrs:    Procal: N/A CRP: N/A Lactic Acid: 1.0         Imaging results reviewed over the past 24 hrs:   No results found for this or any previous visit (from the past 24 hour(s)).

## 2023-04-13 NOTE — PROGRESS NOTES
Sepsis Evaluation     I was called to see Deven Hernandes due to abnormal vital signs triggering the Sepsis SIRS screening alert. He is not known to have an infection.     PHYSICAL EXAM  Vital Signs:  Temp: 97.3  F (36.3  C) Temp src: Oral BP: 98/64 Pulse: 93   Resp: 18 SpO2: 90 % O2 Device: None (Room air) Oxygen Delivery: 8 LPM    General: in no acute distress  Mental Status: AAOx4.     Remainder of physical exam is significant for see progress note.     DATA  Lactic Acid   Date Value Ref Range Status   04/12/2023 1.0 0.7 - 2.0 mmol/L Final       ASSESSMENT AND PLAN  NO EVIDENCE OF SEPSIS at this time.  Vital sign, physical exam, and lab findings are due to softer BP from taking antihypertensives pre-op in combination with post-op state, volume loss and general anesthesia.    Disposition: The patient will remain on the current unit. We will continue to monitor this patient closely..  Kelle Kramer PA-C  04/13/23, 12:14 AM    Sepsis Criteria   Sepsis: The body's generalized inflammatory state as a response to an infection. Sepsis Predictive Model includes >80 variable to alert to potential sepsis.  Severe Sepsis: Sepsis plus one or more variables of acute organ dysfunction (Note: lactic acid >2 or acute encephalopathy each qualify as organ dysfunction)  Septic Shock: Sepsis AND hypotension despite adequate volume resuscitation with crystalloid or lactic acid >=4  Note: HYPOTENSION is defined as 2 BP readings measured 3 hrs apart that have a SBP <90, MAP <65, or decrease >40 mmHg, occurring 6 hrs before or after t-zero

## 2023-04-13 NOTE — PROVIDER NOTIFICATION
"Kinjal reynaga messaged \"[id number]; the patient is anticipating discharge as soon as able; he has been cleared from medical and therapy standpoints. RONEY Latham [phone]\"  "

## 2023-04-13 NOTE — PROGRESS NOTES
A&Ox4. VSS on RA. PIV SL. Up with A1 GB/W. Voiding in BR. Patient denies pain. CMS intact w. Exception of baseline numbness in R foot and calf which patient reports is improving. Tolerating regular diet. Dressing CDI.

## 2023-04-13 NOTE — PLAN OF CARE
Physical Therapy Discharge Summary    Reason for therapy discharge:    All goals and outcomes met, no further needs identified.    Progress towards therapy goal(s). See goals on Care Plan in Epic electronic health record for goal details.  Goals met    Therapy recommendation(s):    Continue home exercise program. Pt is below baseline, but amb household distances well. Currently amb ind in room and transferring Mod I. Pt lives in multi level home with 1 SHASHI and B railing. Pt has assistance from wife 24/7. Pt has met all IP PT goals and is cleared for d/c from PT.

## 2023-04-13 NOTE — PROGRESS NOTES
"   04/13/23 1000   Appointment Info   Signing Clinician's Name / Credentials (PT) HECTOR Lopez   Student Supervision On-site supervision provided   Rehab Comments (PT) spinal precautions,1 eval   Quick Adds   Quick Adds Certification   Living Environment   People in Home spouse   Current Living Arrangements house   Home Accessibility stairs to enter home;stairs within home   Number of Stairs, Main Entrance 1   Stair Railings, Main Entrance railings safe and in good condition   Number of Stairs, Within Home, Primary ten   Stair Railings, Within Home, Primary railings safe and in good condition   Transportation Anticipated family or friend will provide   Living Environment Comments Pt lives in multi level home with 1 SHASHI and stairs in the house. Pt lives with wife and does not need to use the stairs in the house for the next few weeks.   Self-Care   Usual Activity Tolerance good   Current Activity Tolerance good   Fall history within last six months no   Activity/Exercise/Self-Care Comment Pt is IND with all self cares at baseline and does not use an AD. Pts wife will be taking care of him 24/7.   General Information   Onset of Illness/Injury or Date of Surgery 04/12/23   Referring Physician Alejandra Baron PA-C   Pertinent History of Current Problem (include personal factors and/or comorbidities that impact the POC) per chart, \"Deven Hernandes is a 67 year old male with PMHx of HTN, hyperlipidemia, BPH, GERD, and diet controlled T2DM (A1C 5.9) admitted on 4/12/2023. He underwent L3-4 MIS microdiscectomy with Dr. Jeffries. Hospitalist service was consulted 4/13 evening due to the sepsis BPA firing. Lactic WNL. See separate sepsis note. Lactic WNL. No evidence of infection. \"   Existing Precautions/Restrictions spinal   Cognition   Affect/Mental Status (Cognition) WNL   Orientation Status (Cognition) oriented x 4   Follows Commands (Cognition) WNL   Pain Assessment   Patient Currently in Pain Yes, see Vital Sign " flowsheet   Integumentary/Edema   Integumentary/Edema no deficits were identifed   Posture    Posture Forward head position   Range of Motion (ROM)   Range of Motion ROM is WFL;ROM deficits secondary to surgical procedure;ROM deficits secondary to weakness   Strength (Manual Muscle Testing)   Strength (Manual Muscle Testing) Deficits observed during functional mobility   Strength Comments decreased strength secondary to post op status, strength is WFL   Bed Mobility   Bed Mobility rolling left;rolling right;scooting/bridging;sit-supine;supine-sit   Rolling Left [place] (Bed Mobility) modified independence   Rolling Right [place] (Bed Mobility) modified independence   Scooting/Bridging [place] (Bed Mobility) modified independence   Supine-Sit [place] (Bed Mobility) modified independence   Sit-Supine [place] (Bed Mobility) modified independence   Impairments Contributing to Impaired Bed Mobility pain;decreased ROM;decreased strength   Comment, (Bed Mobility) Mod I   Transfers   Transfers sit-stand transfer   Maintains Weight-bearing Status (Transfers) able to maintain   Impairments Contributing to Impaired Transfers pain;decreased ROM;decreased strength   Comment, (Transfers) Mod I   Sit-Stand Transfer   Sit-Stand [place] (Transfers) modified independence   Comment, (Sit-Stand Transfer) Mod I   Gait/Stairs (Locomotion)   [place] Level (Gait) modified independence   Distance in Feet 5   Distance in Feet (Gait) >150   Pattern (Gait) swing-through;step-through   Deviations/Abnormal Patterns (Gait) maria elena decreased;gait speed decreased;steppage;stride length decreased   Maintains Weight-bearing Status (Gait) able to maintain   Negotiation (Stairs) stairs independence;stairs assistive device;handrail location;number of steps   [place] Level (Stairs) modified independence   Handrail Location (Stairs) right side (ascending);right side (descending)   Number of Steps (Stairs) 4    Comment, (Gait/Stairs) Mod I   Balance   Balance other (describe)   Balance Quick Add Systems impairment contributing to balance disturbance   Sensory Examination   Sensory Perception WFL   Coordination   Coordination no deficits were identified   Muscle Tone   Muscle Tone no deficits were identified   Clinical Impression   Criteria for Skilled Therapeutic Intervention Evaluation only   PT Diagnosis (PT) decreased functional mobility   Influenced by the following impairments pain, decreased strength, decreased ROM, post op status   Functional limitations due to impairments decreased endurance, impaired ability to complete ADLs   Clinical Presentation (PT Evaluation Complexity) Stable/Uncomplicated   Clinical Presentation Rationale clinical judgement   Clinical Decision Making (Complexity) low complexity   Planned Therapy Interventions (PT) gait training;transfer training;stair training   Risk & Benefits of therapy have been explained evaluation/treatment results reviewed;care plan/treatment goals reviewed;risks/benefits reviewed;current/potential barriers reviewed;participants voiced agreement with care plan;participants included;patient;spouse/significant other   PT Total Evaluation Time   PT Eval, Low Complexity Minutes (26322) 10   Plan of Care Review   Plan of Care Reviewed With patient;spouse   Therapy Certification   Start of care date 04/13/23   Certification date from 04/13/23   Certification date to 04/14/23   Medical Diagnosis Lumbar microdiscectomy   Physical Therapy Goals   PT Frequency One time eval and treatment only   PT Predicted Duration/Target Date for Goal Attainment 04/14/23   PT Goals Bed Mobility;Transfers;Gait;Stairs   PT: Bed Mobility Modified independent;Supine to/from sit;Rolling;Bridging;Within precautions;Goal Met   PT: Transfers Modified independent;Sit to/from stand;Bed to/from chair;Within precautions;Goal Met   PT: Gait Modified independent;Within precautions;150 feet;Goal Met   PT:  Stairs Modified independent;Within precautions;1 stair;Goal Met   Interventions   Interventions Quick Adds Gait Training   Gait Training   Gait Training Minutes (24845) 5   Symptoms Noted During/After Treatment (Gait Training) fatigue   Treatment Detail/Skilled Intervention Pt supine on entry, wife bedside and agreeable to therapy. After eval, instructed pt in amb >150' with Mod I, no AD and cues for forward gaze. Pt tolerating amb well, reports slight fatigue, but no overt LOB throughout session. Instructed pt in negotiating 4 steps with railing on R. Pt demonstrating step through pattern on ascending and step to pattern descending. Edu pt on spinal hand out and precautions, time spent answering questions on activity guidlines. Ended session supine in bed, all needs met, and call light in reach. Nursing updated   [place] Level (Gait Training) stand-by assist   Physical Assistance Level (Gait Training) supervision;verbal cues   Pattern Analysis (Gait Training) swing-through gait   Gait Analysis Deviations decreased maria elena;decreased step length;decreased stride length   Impairments (Gait Analysis/Training) pain;ROM decreased;strength decreased   Stair Railings present on right side   Physical Assist/Nonphysical Assist (Stairs) supervision   Level of [place] (Stairs) stand-by assist   PT Discharge Planning   PT Plan d/c from PT   PT Discharge Recommendation (DC Rec) home with assist   PT Rationale for DC Rec Pt is below baseline, but amb household distances well. Currently amb ind in room and transferring Mod I. Pt lives in multi level home with 1 SHASHI and B railing. Pt has assistance from wife 24/7. Pt has met all IP PT goals and is cleared for d/c from PT.   PT Brief overview of current status Mod I with all amb, transfers and stairs   Total Session Time   Timed Code Treatment Minutes 5   Total Session Time (sum of timed and untimed services) 15       [facility]  OUTPATIENT  PHYSICAL THERAPY EVALUATION  PLAN OF TREATMENT FOR OUTPATIENT REHABILITATION  (COMPLETE FOR INITIAL CLAIMS ONLY)  Patient's Last Name, First Name, M.I.  YOB: 1955  Deven Hernandes                        Provider's Name  [facility] Medical Record No.  [id number]                             Onset Date:  04/12/23   Start of Care Date:  (P) 04/13/23   Type:     _X_PT   ___OT   ___SLP Medical Diagnosis:  (P) Lumbar microdiscectomy              PT Diagnosis:  decreased functional mobility Visits from SOC:  1     See note for plan of treatment, functional goals and certification details    I CERTIFY THE NEED FOR THESE SERVICES FURNISHED UNDER        THIS PLAN OF TREATMENT AND WHILE UNDER MY CARE     (Physician co-signature of this document indicates review and certification of the therapy plan).

## 2023-04-13 NOTE — PLAN OF CARE
Goal Outcome Evaluation:         Patient vital signs are at baseline: Yes, numbness/tingling in the RLE is improving from baseline. A/O x4  Patient able to ambulate as they were prior to admission or with assist devices provided by therapies during their stay:  Yes, up with standby assist, consider independent status.   Patient MUST void prior to discharge:  Yes  Patient able to tolerate oral intake:  Yes  Pain has adequate pain control using Oral analgesics:  Yes, pain well controlled with PRN and scheduled medications  Does patient have an identified :  Yes  Has goal D/C date and time been discussed with patient:  Yes, should be able to discharge today home self care.

## 2023-04-13 NOTE — PROGRESS NOTES
"[facility]    Medicine Progress Note - Hospitalist Service    Date of Admission:  4/12/2023    Assessment & Plan   Deven Hernandes is a 67 year old male with PMHx of HTN, hyperlipidemia, BPH, GERD, and diet controlled T2DM (A1C 5.9) admitted on 4/12/2023. He underwent L3-4 MIS microdiscectomy with Dr. Jeffries. Hospitalist service was consulted 4/13 evening due to the sepsis BPA firing. Lactic WNL.     Lumbar radiculopathy  S?p minimally invasive right lumbar 3 to lumbar 4 discectomy on 4/12/23    Post-op management per Neurosurgery.    Sepsis BPA  See separate sepsis note. Lactic WNL. No evidence of infection. Vitals stable. Asymptomatic.     Remains afebrile. Monitor for signs/symptoms of infection.    Hypertension    Continue PTA Norvasc.    Resume PTA Losartan tomorrow morning.    Hyperlipidemia    Continue PTA statin.     GERD    Continue PPI.    BPH    Continue PTA Flomax.       Diet: Advance Diet as Tolerated: Regular Diet Adult    DVT Prophylaxis: Defer to primary service  Pete Catheter: Not present  Lines: None     Cardiac Monitoring: None  Code Status: Full Code      Clinically Significant Risk Factors Present on Admission                  # Hypertension: home medication list includes antihypertensive(s)      # Overweight: Estimated body mass index is 27.28 kg/m  as calculated from the following:    Height as of this encounter: 1.6 m (5' 3\").    Weight as of this encounter: 69.9 kg (154 lb).           Disposition Plan      Expected Discharge Date: 04/13/2023      Destination: home with family            Avery Lentz MD  Hospitalist Service  [facility]  Securely message with Flutter (more info)  Text page via [employer] Paging/Directory   ______________________________________________________________________    Interval History   Deven Hernandes was seen this morning. He feels pretty good. Was just up with therapy. His back is sore, but pain is fairly " well controlled with current medications. Denies fevers, chest pain, shortness of breath, nausea, abdominal pain, change in bowel habits, urinary symptoms.    Physical Exam   Vital Signs: Temp: 98.2  F (36.8  C) Temp src: Oral BP: 120/77 Pulse: 92   Resp: 16 SpO2: 93 % O2 Device: None (Room air) Oxygen Delivery: 8 LPM  Weight: 154 lbs 0 oz    Constitutional: awake, alert, cooperative, no apparent distress, laying in the ER bed  Respiratory: no increased work of breathing, clear to auscultation bilaterally, no crackles or wheezing  Cardiovascular: regular rate and rhythm, normal S1 and S2, no murmur noted  GI: normal bowel sounds, soft, non-distended, non-tender  Skin: warm, dry  Musculoskeletal: no lower extremity pitting edema present  Neurologic: awake, alert, answers questions appropriately, moves all extremities    Medical Decision Making       30 MINUTES SPENT BY ME on the date of service doing chart review, history, exam, documentation & further activities per the note.      Data     I have personally reviewed the following data over the past 24 hrs:    N/A  \   N/A   / N/A     140 104 9.7 /  124 (H)   3.4 24 0.87 \       Procal: N/A CRP: N/A Lactic Acid: 1.0

## 2023-04-13 NOTE — PROGRESS NOTES
Paged about sepsis protocol fired  Advised to keep our team updated on lactic acid, hospitalist consult   Patient stable, VSS, no new deficit    Patti Mcghee PA-C  [facility]  [address]  [address] [place] [id number]    Tel [phone]  Pager [phone]

## 2023-04-17 ENCOUNTER — TELEPHONE (OUTPATIENT)
Dept: NEUROSURGERY | Facility: CLINIC | Age: 68
End: 2023-04-17

## 2023-04-17 NOTE — TELEPHONE ENCOUNTER
Martha from [facility] calling about patient's disability form. Our clinic had faxed it to them on 4/13/23 without the doctor's signature and without a date. Martha is requesting that the clinic resend the disability form with doctor's signature and date along with expected discharge date of patient. Fax # [phone]    # [phone]

## 2023-04-24 ENCOUNTER — TELEPHONE (OUTPATIENT)
Dept: NEUROSURGERY | Facility: CLINIC | Age: 68
End: 2023-04-24
Payer: COMMERCIAL

## 2023-04-24 NOTE — TELEPHONE ENCOUNTER
Patient had surgery with  on 4/12/23 and his short term disability insurance company ([employer]) is requesting an attending physician's statement from  for his short term disability. It needs to be signed by  and faxed.    Fax # [phone]  Claim ## [id number]

## 2023-04-26 ENCOUNTER — ALLIED HEALTH/NURSE VISIT (OUTPATIENT)
Dept: NEUROSURGERY | Facility: CLINIC | Age: 68
End: 2023-04-26
Payer: COMMERCIAL

## 2023-04-26 DIAGNOSIS — Z98.890 S/P SPINAL SURGERY: Primary | ICD-10-CM

## 2023-04-26 PROCEDURE — 99207 PR NO CHARGE NURSE ONLY: CPT

## 2023-04-26 NOTE — PROGRESS NOTES
Post-op Nurse Visit:    Patient seen today per the order of Dr Jeffries  DOS: 4/12/23  Procedure: Right L3-4 MIS microdiscectomy    Pain/Neuro Assessment  Denies pain in back until yesterday when he wore normal pants/belt for the first time. Some zingers in incision and right toes occasionally.   Numbness/tingling: Baseline numbness in R foot and calf which patient reports is improving.  Strength: Still feels weak in lower extremities. Had Right foot drag prior to surgery that is improving. Wife reports still a minor limp by the end of the day.     Pain Relief Measures:  Oxycodone: rarely using anymore   Tylenol: every couple of days  Robaxin: not for a couple of days  Ice: as needed     Incision  Incision inspected. Edges well-approximated. No redness, swelling, drainage, or warmth noted. There is some residual glue still intact. Patient did not want writer to try to remove     Activity  Following restrictions   Falls: none  Patient is walking frequently without difficulty. Walking outside   Denies redness, swelling, or warmth to bilateral calves.   Wearing brace? No    GI/  Difficulty swallowing? No  Patient's appetite is normal  Bowel/bladder problems? No  Taking stool softeners? No     Refills/x-rays/return to work  Refills given at this appointment? No  Sent for x-rays after this appointment? No  Ordered future x-rays? No  Return to work discussed at this appointment? Yes, as of now he is approved to be out of work for about a month. He asks to be extended to the 6 week joseph, to after 5/23 when he sees Dr Jeffries. As he is not able to work within his restrictions for his job   Will update paper work and send to [place].  Update patient via [employer]    All of patient's questions addressed today. Patient was instructed to call with any additional questions/concerns.

## 2023-04-26 NOTE — PATIENT INSTRUCTIONS
Instructions for Patient    Incision  Keep your incision clean and dry at all times.   It is okay to shower, just pat the incision dry   No submerging incision in water such as pools, hot tubs, or baths for at least 8 weeks and until the incision is healed  Do not apply lotions or ointments to incision    Activity  No lifting greater than 10 pounds. Limited bending, twisting, or overhead reaching.  Walking is the best way to start exercise after surgery. Take short frequent walks. You may gradually increase the distance as tolerated. If you feel pain, decrease your activity, but DO NOT stop walking. Walking will help you gain strength, prevent muscle soreness and spasms, and prevent blood clots.   Avoid bed rest and prolonged sitting for longer than 30 minutes (change positions frequently while awake)  No contact sports or high impact activities such as; running/jogging, snowmobile or 4 garcia riding or any other recreational vehicles until after given clearance at one of your follow up visits    Medications   Refills of pain medication:   Please call the neurosurgery clinic to request 2-3 days before you run out  Don't take more than 3000 mg of Acetaminophen in 24 hours  Ok to begin taking Aspirin and NSAIDs (ex: ibuprofen/Advil)  Encouraged icing for at least 3-4 times throughout the day for 20-30 minutes at a time. Avoid heat to the incision area.   Taking stool softeners regularly can reduce constipation commonly caused by narcotic pain medications.    Contact clinic or Emergency Room if you develop:   Infection (redness, swelling, warmth, drainage, fever over 101 F)  New injury  Bladder or bowel changes or loss of control    Signs of blood clot:  Swelling and/or warmth in one or both legs  Pain or tenderness in your leg, ankle, foot, or arm   Red or discolored skin     Go to the Emergency Room   If sudden onset of severe headache, weakness, confusion, change in level of consciousness, pain, or loss of  movement.  Chest pain  Trouble breathing     Post-operative appointments  6 week and 3 months post-op    [facility]  [facility]  [address] [place] [id number]  Telephone:  [phone]   Fax:  [phone]

## 2023-05-03 ENCOUNTER — DOCUMENTATION ONLY (OUTPATIENT)
Dept: NEUROSURGERY | Facility: CLINIC | Age: 68
End: 2023-05-03
Payer: COMMERCIAL

## 2023-05-03 NOTE — CONFIDENTIAL NOTE
[facility] Information Request form completed. Given to [facility] team for processing.     Per chart review, provider team approved extending patient's rtw to 5/24. Patient is scheduled for next follow-up on 5/23.

## 2023-05-03 NOTE — PROGRESS NOTES
Scanned in [employer] medical records and faxed back completed extension of short term disability form then returned to nurses.    Right Fax pending

## 2023-05-05 ENCOUNTER — DOCUMENTATION ONLY (OUTPATIENT)
Dept: NEUROSURGERY | Facility: CLINIC | Age: 68
End: 2023-05-05
Payer: COMMERCIAL

## 2023-05-05 NOTE — PROGRESS NOTES
Received blank Certification of HCP Form from [employer]  Shanna [employer]af[employer] to be completed and faxed back, re: extension. Scanned in.  Also acceptable is updated form recently faxed to [place].     Faxed updated [place] forms to Shanna Palafox([employer]) May 5, 2023 to fax number [phone].    Right Fax confirmed at 336PM PM    Shan Stallings RN

## 2023-05-08 DIAGNOSIS — N52.01 ERECTILE DYSFUNCTION DUE TO ARTERIAL INSUFFICIENCY: ICD-10-CM

## 2023-05-08 RX ORDER — SILDENAFIL 100 MG/1
100 TABLET, FILM COATED ORAL DAILY PRN
Qty: 30 TABLET | Refills: 0 | Status: SHIPPED | OUTPATIENT
Start: 2023-05-08 | End: 2023-06-21

## 2023-05-19 DIAGNOSIS — R97.20 ELEVATED PROSTATE SPECIFIC ANTIGEN (PSA): Primary | ICD-10-CM

## 2023-05-23 ENCOUNTER — OFFICE VISIT (OUTPATIENT)
Dept: NEUROSURGERY | Facility: CLINIC | Age: 68
End: 2023-05-23
Payer: COMMERCIAL

## 2023-05-23 VITALS — DIASTOLIC BLOOD PRESSURE: 74 MMHG | HEART RATE: 107 BPM | OXYGEN SATURATION: 96 % | SYSTOLIC BLOOD PRESSURE: 108 MMHG

## 2023-05-23 DIAGNOSIS — Z98.890 S/P SPINAL SURGERY: ICD-10-CM

## 2023-05-23 DIAGNOSIS — M54.16 LUMBAR RADICULOPATHY: Primary | ICD-10-CM

## 2023-05-23 PROCEDURE — 99024 POSTOP FOLLOW-UP VISIT: CPT | Performed by: NEUROLOGICAL SURGERY

## 2023-05-23 ASSESSMENT — PAIN SCALES - GENERAL: PAINLEVEL: MILD PAIN (3)

## 2023-05-23 NOTE — NURSING NOTE
"Deven Hernandes is a 67 year old male who presents for:  Chief Complaint   Patient presents with     RECHECK     Right sided weakness of lower extremity, slight aching and throbbing in low back area        Initial Vitals:  /74   Pulse 107   SpO2 96%  Estimated body mass index is 27.28 kg/m  as calculated from the following:    Height as of 4/12/23: 5' 3\" (1.6 m).    Weight as of 4/12/23: 154 lb (69.9 kg).. There is no height or weight on file to calculate BSA. BP completed using cuff size: regular  Mild Pain (3)    Nursing Comments:       Sherrell Flores    "

## 2023-05-23 NOTE — LETTER
May 23, 2023      Deven Hernandes  [address] [address]        To Whom it May Concern,      Deven is being seen at our clinic for post-operative follow up care. He is able to return to work on 5/24/23 with the restrictions of:    -Light duty work only  -No heavy lifting greater than 10 pounds   -No twisting or bending    Morgan will be re-evaluated at the next follow up visit. Please call our clinic with questions or concerns: [phone]        Sincerely,        Tip Jeffries MD

## 2023-05-23 NOTE — PROGRESS NOTES
NEUROSURGERY FOLLOWUP  NOTE    Deven Hernandes comes today in f/u 6 weeks s/p 04/12/2023; minimally invasive right L3-4 hemilaminectomy foraminotomy, microdiscectomy.    He is overall doing well with approximately 90% improvement in his right lower extremity pain and 30 to 40% improvement in his right lower extremity weakness.  He reports no new onset neurological issues.  He is able to walk okay with no pain.    Reports no issues with his incision    PHYSICAL EXAM:   Constitutional: /74   Pulse 107   SpO2 96%      Mental Status: A & O in no acute distress.  Affect is appropriate.  Speech is fluent.  Recent and remote memory are intact.  Attention span and concentration are normal.     Motor: No pronator drift of upper extremity.   Normal bulk and tone all muscle groups of upper and lower extremities.          Delt Bi Tri Hand Flex/  Ext Iliopsoas Quadriceps Tibialis Anterior EHL Gastroc     C5 C6 C7 C8/T1 L2 L3 L4 L5 S1   R 5 5 5 5 5 5 4+ 5 5   L 5 5 5 5 5 5 5 5 5            Sensory: Sensation intact bilaterally to light touch.     Coordination; finger to nose, heel to shin, rapid alternating movements smooth and rhythmic.   Romberg intact.   /tandem gait intact.    Normal gait and station.  Toe walking intact on both sides  No high-stepping gait     Reflexes;                       Right              Left  Brachioradialis (C5,6)      2+                 2+  Biceps   (C5,6)                 2+                 2+  Triceps  (C7,8)                 2+                2+  Knee (L3,4)                      2+                2+  Ankle jerk (S1,2)              2+                2+        No hoffmans/babinski/ clonus.     Incision healed well    IMAGING:   No new images today       CONSULTATION ASSESSMENT AND PLAN:    Mr. Hernandes is in the clinic today for 6 weeks follow-up following right L3-4 hemilaminectomy micro discectomy and foraminotomy.  He is doing very well following the surgery with 80 to 90% improvement in  his pain and 30 to 40% improvement in his right lower extremity weakness.  He is willing to go back to work.  His incision has healed well with no issues.  On clinical examination there is improvement in his right EHL weakness and right tibialis anterior weakness.    I would recommend physical therapy as an outpatient for his residual weakness.  I also explained to the patient that some of his sensory symptoms may still persist despite decompression.  I would clear him for work with lighter duties for 2 weeks.    Can follow-up with me on as needed basis.  All questions were answered and patient sounded understanding.  He can contact us if there is any questions or concerns or worsening neurological deficits.    I spent more than 20 minutes in this apt, examining the pt, reviewing the scans, reviewing notes from chart, discussing treatment options with risks and benefits and coordinating care.     Tip Jeffries MD      CC:     Garrett Clifford  [facility] [address] [place] [id number]

## 2023-05-23 NOTE — LETTER
5/23/2023         RE: Deven Hernandes  [address] [facility] [address]        Dear Colleague,    Thank you for referring your patient, Deven Hernandes, to the [facility]. Please see a copy of my visit note below.    NEUROSURGERY FOLLOWUP  NOTE    Deven Hernandes comes today in f/u 6 weeks s/p 04/12/2023; minimally invasive right L3-4 hemilaminectomy foraminotomy, microdiscectomy.    He is overall doing well with approximately 90% improvement in his right lower extremity pain and 30 to 40% improvement in his right lower extremity weakness.  He reports no new onset neurological issues.  He is able to walk okay with no pain.    Reports no issues with his incision    PHYSICAL EXAM:   Constitutional: /74   Pulse 107   SpO2 96%      Mental Status: A & O in no acute distress.  Affect is appropriate.  Speech is fluent.  Recent and remote memory are intact.  Attention span and concentration are normal.     Motor: No pronator drift of upper extremity.   Normal bulk and tone all muscle groups of upper and lower extremities.          Delt Bi Tri Hand Flex/  Ext Iliopsoas Quadriceps Tibialis Anterior EHL Gastroc     C5 C6 C7 C8/T1 L2 L3 L4 L5 S1   R 5 5 5 5 5 5 4+ 5 5   L 5 5 5 5 5 5 5 5 5            Sensory: Sensation intact bilaterally to light touch.     Coordination; finger to nose, heel to shin, rapid alternating movements smooth and rhythmic.   Romberg intact.   /tandem gait intact.    Normal gait and station.  Toe walking intact on both sides  No high-stepping gait     Reflexes;                       Right              Left  Brachioradialis (C5,6)      2+                 2+  Biceps   (C5,6)                 2+                 2+  Triceps  (C7,8)                 2+                2+  Knee (L3,4)                      2+                2+  Ankle jerk (S1,2)              2+                2+        No hoffmans/babinski/ clonus.     Incision healed well    IMAGING:    No new images today       CONSULTATION ASSESSMENT AND PLAN:    Mr. Hernandes is in the clinic today for 6 weeks follow-up following right L3-4 hemilaminectomy micro discectomy and foraminotomy.  He is doing very well following the surgery with 80 to 90% improvement in his pain and 30 to 40% improvement in his right lower extremity weakness.  He is willing to go back to work.  His incision has healed well with no issues.  On clinical examination there is improvement in his right EHL weakness and right tibialis anterior weakness.    I would recommend physical therapy as an outpatient for his residual weakness.  I also explained to the patient that some of his sensory symptoms may still persist despite decompression.  I would clear him for work with lighter duties for 2 weeks.    Can follow-up with me on as needed basis.  All questions were answered and patient sounded understanding.  He can contact us if there is any questions or concerns or worsening neurological deficits.    I spent more than 20 minutes in this apt, examining the pt, reviewing the scans, reviewing notes from chart, discussing treatment options with risks and benefits and coordinating care.     Tip Jeffries MD      CC:     Garrett Clifford  [address] [place] [id number]        Again, thank you for allowing me to participate in the care of your patient.        Sincerely,        Tip Jeffries MD

## 2023-05-23 NOTE — PATIENT INSTRUCTIONS
Patient Next Steps:      Order placed for physical therapy. You can call the phone number highlighted in the order to schedule your appointment. Please call our clinic if symptoms persist after your course of physical therapy.  If you have not heard from the scheduling office within 2 business days, please call [phone] for  [employer], [phone] for [employer] and [phone] for [employer].      Please call us if you have any further questions or concerns.    [facility]   Phone: [phone]  Fax: [phone]

## 2023-06-12 ENCOUNTER — THERAPY VISIT (OUTPATIENT)
Dept: PHYSICAL THERAPY | Facility: CLINIC | Age: 68
End: 2023-06-12
Attending: NEUROLOGICAL SURGERY
Payer: COMMERCIAL

## 2023-06-12 DIAGNOSIS — Z98.890 S/P SPINAL SURGERY: ICD-10-CM

## 2023-06-12 DIAGNOSIS — M54.41 ACUTE RIGHT-SIDED LOW BACK PAIN WITH RIGHT-SIDED SCIATICA: ICD-10-CM

## 2023-06-12 DIAGNOSIS — M54.16 LUMBAR RADICULOPATHY: ICD-10-CM

## 2023-06-12 PROCEDURE — 97161 PT EVAL LOW COMPLEX 20 MIN: CPT | Mod: GP | Performed by: PHYSICAL THERAPIST

## 2023-06-12 PROCEDURE — 97530 THERAPEUTIC ACTIVITIES: CPT | Mod: GP | Performed by: PHYSICAL THERAPIST

## 2023-06-12 PROCEDURE — 97110 THERAPEUTIC EXERCISES: CPT | Mod: GP | Performed by: PHYSICAL THERAPIST

## 2023-06-15 PROBLEM — M54.41 ACUTE RIGHT-SIDED LOW BACK PAIN WITH RIGHT-SIDED SCIATICA: Status: ACTIVE | Noted: 2023-06-15

## 2023-06-15 NOTE — PROGRESS NOTES
PHYSICAL THERAPY EVALUATION  Type of Visit: Evaluation    See electronic medical record for Abuse and Falls Screening details.    Subjective      Presenting condition or subjective complaint: Lack of feeling in right leg  Date of onset: 04/12/23    Relevant medical history:     Dates & types of surgery: Disc surgery April 12    Prior diagnostic imaging/testing results: MRI; X-ray     Prior therapy history for the same diagnosis, illness or injury: Yes Therapy prior to surgery    Prior Level of Function   Transfers: Independent  Ambulation: Independent  ADL: Independent      Living Environment  Social support: With a significant other or spouse   Type of home: House   Stairs to enter the home: No       Ramp: No   Stairs inside the home: Yes 12 Is there a railing: Yes   Help at home: Home and Yard maintenance tasks  Equipment owned:       Employment: Yes Sales  Hobbies/Interests:      Patient goals for therapy:      Pain assessment: Location: R LS area/Rating: 3/10     Objective   LUMBAR SPINE EVALUATION  PAIN: Pain Level at Rest: 1/10  Pain Level with Use: 3/10  Pain is Exacerbated By: prolonged standing/walking    POSTURE: WNL  GAIT:   Weightbearing Status: WBAT  Assistive Device(s): None  Gait Deviations: Antalgic - slight limp on R; slight decreased heel strike R  BALANCE/PROPRIOCEPTION: WNL       ROM: lumbar flex 90%; ext 90%  PELVIC/SI SCREEN: WNL  STRENGTH: lower abdominals 4/5; R DF 4-/5; EHL 4-/5    MYOTOMES:    Left Right   T12-L3 (Hip Flexion) 5 5   L2-4 (Quads)  5 5   L4 (Ankle DF) 5 4-   L5 (Great Toe Ext) 5 4-   S1 (Toe Raise) 5 4     DTR S: WNL  CORD SIGNS: WNL  DERMATOMES:    Left Right   T12  Normal (light touch) Normal (light touch)   L1 Normal (light touch) Normal (light touch)   L2 Normal (light touch) Normal (light touch)   L3 Normal (light touch) Normal (light touch)   L4 Normal (light touch) Hypo (light touch)   L5 Normal (light touch) Hypo (light touch)   S1 Normal (light touch) Normal (light  touch)     NEURAL TENSION: Lumbar WNL  FLEXIBILITY: R hamstring tightness  LUMBAR/HIP Special Tests: slump (-) R; Chemo slightly (+) R; SLR slightly (+) R   PELVIS/SI SPECIAL TESTS: WNL    PALPATION: slight MF tightness in R LS musculature  SPINAL SEGMENTAL CONCLUSIONS: slight pain with P/A glide L4-5; hypomobility L3-5      Assessment & Plan   CLINICAL IMPRESSIONS   Medical Diagnosis: s/p L3-5 laminectomy    Treatment Diagnosis: s/p L3-5 laminectomy   Impression/Assessment: Patient is a 67 year old male with R LE complaints.  The following significant findings have been identified: Decreased ROM/flexibility, Decreased strength, Impaired sensation, Impaired gait and Impaired muscle performance. These impairments interfere with their ability to perform work tasks, household chores and community mobility as compared to previous level of function.     Clinical Decision Making (Complexity):   Clinical Presentation: Stable/Uncomplicated  Clinical Presentation Rationale: based on medical and personal factors listed in PT evaluation  Clinical Decision Making (Complexity): Low complexity    PLAN OF CARE  Treatment Interventions:  Interventions: Therapeutic Activity, Therapeutic Exercise, Self-Care/Home Management    Long Term Goals     PT Goal 1  Goal Identifier: ambulation  Goal Description: minutes patient will be able to walk - 30  Rationale: to maximize safety and independence within the community  Target Date: 09/04/23      Frequency of Treatment: 1 x month  Duration of Treatment: 3 months      Education Assessment:   Learner/Method: Patient;Listening;Demonstration    Risks and benefits of evaluation/treatment have been explained.   Patient/Family/caregiver agrees with Plan of Care.     Evaluation Time:     PT Eval, Low Complexity Minutes (71022): 15      Signing Clinician: Albina Hines PT      [facility]                                                                                    OUTPATIENT PHYSICAL THERAPY      PLAN OF TREATMENT FOR OUTPATIENT REHABILITATION   Patient's Last Name, First Name, Deven Rosas YOB: 1955   Provider's Name   [facility]   Medical Record No.  [id number]     Onset Date: 04/12/23  Start of Care Date: 06/12/23     Medical Diagnosis:  s/p L3-5 laminectomy      PT Treatment Diagnosis:  s/p L3-5 laminectomy Plan of Treatment  Frequency/Duration: 1 x month/ 3 months    Certification date from 06/12/23 to 09/09/23         See note for plan of treatment details and functional goals     Albina Hines, PT                         I CERTIFY THE NEED FOR THESE SERVICES FURNISHED UNDER        THIS PLAN OF TREATMENT AND WHILE UNDER MY CARE     (Physician attestation of this document indicates review and certification of the therapy plan).                  Referring Provider:  Tip Jeffries      Initial Assessment  See Epic Evaluation- Start of Care Date: 06/12/23

## 2023-06-21 DIAGNOSIS — N52.01 ERECTILE DYSFUNCTION DUE TO ARTERIAL INSUFFICIENCY: ICD-10-CM

## 2023-06-21 RX ORDER — SILDENAFIL 100 MG/1
100 TABLET, FILM COATED ORAL DAILY PRN
Qty: 30 TABLET | Refills: 0 | Status: SHIPPED | OUTPATIENT
Start: 2023-06-21 | End: 2023-08-15

## 2023-06-22 ENCOUNTER — OFFICE VISIT (OUTPATIENT)
Dept: UROLOGY | Facility: CLINIC | Age: 68
End: 2023-06-22
Payer: COMMERCIAL

## 2023-06-22 VITALS
HEART RATE: 91 BPM | SYSTOLIC BLOOD PRESSURE: 120 MMHG | BODY MASS INDEX: 26.58 KG/M2 | DIASTOLIC BLOOD PRESSURE: 64 MMHG | HEIGHT: 63 IN | OXYGEN SATURATION: 96 % | WEIGHT: 150 LBS

## 2023-06-22 DIAGNOSIS — R97.20 ELEVATED PROSTATE SPECIFIC ANTIGEN (PSA): ICD-10-CM

## 2023-06-22 DIAGNOSIS — N13.8 BPH WITH OBSTRUCTION/LOWER URINARY TRACT SYMPTOMS: ICD-10-CM

## 2023-06-22 DIAGNOSIS — N40.1 BPH WITH OBSTRUCTION/LOWER URINARY TRACT SYMPTOMS: ICD-10-CM

## 2023-06-22 LAB — PSA SERPL-MCNC: 4.8 UG/L (ref 0–4)

## 2023-06-22 PROCEDURE — 36415 COLL VENOUS BLD VENIPUNCTURE: CPT | Performed by: UROLOGY

## 2023-06-22 PROCEDURE — 99213 OFFICE O/P EST LOW 20 MIN: CPT | Performed by: UROLOGY

## 2023-06-22 PROCEDURE — 84153 ASSAY OF PSA TOTAL: CPT | Performed by: UROLOGY

## 2023-06-22 RX ORDER — TAMSULOSIN HYDROCHLORIDE 0.4 MG/1
0.4 CAPSULE ORAL DAILY
Qty: 90 CAPSULE | Refills: 3 | Status: SHIPPED | OUTPATIENT
Start: 2023-06-22 | End: 2024-08-02

## 2023-06-22 ASSESSMENT — PAIN SCALES - GENERAL: PAINLEVEL: NO PAIN (0)

## 2023-06-22 NOTE — Clinical Note
6/22/2023       RE: Deven Hernandes  [address] [facility] [id number]     Dear Colleague,    Thank you for referring your patient, Deven Hernandes, to the [facility] at [facility]. Please see a copy of my visit note below.      CHIEF COMPLAINT   It was my pleasure to see Deven Hernandes who is a 67 year old male for follow-up of Elevated PSA.      HPI  Deven Hernandes is a very pleasant 67 year old male who presents with a history of Elevated PSA (Prostate Specific Antigen). He was noted at annual physical to have PSA to 5.55. MRI with PIRADS 2 lesion and he returns for PSA recheck. Now at 4.98.     No family history of prostate cancer.  He reports increasing difficulty maintaining erections and he has tried sildenafil with modest effect.  We also discussed his worsening urinary symptoms of weak stream, urgency, and nocturia.      PSA  6/22/2023 - 4.80  7/29/2022 - 4.98  3/10/2022 - 5.90  1/24/2022 - 5.55  11/4/2020 - 4.05  10/22/2019 - 3.72     MRI Prostate 3/10/2022  Size: 49 grams  IMPRESSION:  1. Based on the most suspicious abnormality, this exam is  characterized as PIRADS 2 - Clinically significant cancer is unlikely  to be present.    2. No suspicious adenopathy or evidence of pelvic metastases.    PHYSICAL EXAM  Patient is a 67 year old  male   Vitals: There were no vitals taken for this visit.  General Appearance Adult: There is no height or weight on file to calculate BMI.  Alert, no acute distress, oriented  Lungs: no respiratory distress, or pursed lip breathing  Abdomen: soft, nontender, no organomegaly or masses; ***  Back: *** CVAT  Neuro: Alert, oriented, speech and mentation normal  Psych: affect and mood normal  : {JAQUAN EXAM MALE GENITAL:591755}    Outside and Past Medical records:  Review of the result(s) of each unique test - PSA, MRI     ASSESSMENT and PLAN  67 year old male with elevated PSA. Up as  high as 5.90, and now at 4.98. MRI with PIRADS 2 lesion. We again reviewed the nature of PSA elevation and that while the MRI is reassuring, it does not rule out prostate cancer. Furthermore, while his PSA is down a bit, it does remain elevated. We discussed the pros and cons of continue observation vs TRUS biopsy. Risks of TRUS biopsy were reviewed, including risk of infection. He ultimately elects to proceed with biopsy.     We also discussed BPH with urinary obstruction and option of tamsulosin. Risks and side effects were reviewed. We discussed that tamsulosin and sildenafil should not be taken together. He would like to give tamsulosin a trial.     - Start tamsulosin 0.4 mg daily  - Schedule TRUS biopsy      *** minutes spent on the date of the encounter doing chart review, history and exam, documentation and further activities as noted above.    Phu Conteh MD  Urology  [facility]        CHIEF COMPLAINT   It was my pleasure to see Deven Hernandes who is a 67 year old male for follow-up of Elevated PSA.      HPI  Deven Hernandes is a very pleasant 67 year old male who presents with a history of Elevated PSA (Prostate Specific Antigen). He was noted at annual physical to have PSA to 5.55. MRI with PIRADS 2 lesion and he returns for PSA recheck. Now at 4.80. He has elected not to undergo standard TRUS given normal MRI and decline in PSA.     No family history of prostate cancer.  He reports increasing difficulty maintaining erections and he has tried sildenafil with modest effect.  We also discussed his worsening urinary symptoms of weak stream, urgency, and nocturia. This has improved with tamsulosin.     PSA  6/22/2023 - 4.80  7/29/2022 - 4.98  3/10/2022 - 5.90  1/24/2022 - 5.55  11/4/2020 - 4.05  10/22/2019 - 3.72     MRI Prostate 3/10/2022  Size: 49 grams  IMPRESSION:  1. Based on the most suspicious abnormality, this exam is  characterized as PIRADS 2 - Clinically significant  "cancer is unlikely  to be present.    2. No suspicious adenopathy or evidence of pelvic metastases.    PHYSICAL EXAM  Patient is a 67 year old  male   Vitals: Blood pressure 120/64, pulse 91, height 1.6 m (5' 3\"), weight 68 kg (150 lb), SpO2 96 %.  General Appearance Adult: Body mass index is 26.57 kg/m .  Alert, no acute distress, oriented  Lungs: no respiratory distress, or pursed lip breathing  Abdomen: soft, nontender, no organomegaly or masses  Back: no CVAT  Neuro: Alert, oriented, speech and mentation normal  Psych: affect and mood normal    Outside and Past Medical records:  Review of the result(s) of each unique test - PSA     ASSESSMENT and PLAN  67 year old male with elevated PSA. Up as high as 5.90, and now at 4.80. MRI with PIRADS 2 lesion. We again reviewed the nature of PSA elevation and that while the MRI is reassuring, it does not rule out prostate cancer. That said, he would prefer to defer biopsy at this time, given stable and declining PSA numbers.  He has noted improvement with tamsulosin and will continue this. Also continues with sildenafil.     - Continue tamsulosin 0.4 mg daily  - Follow-up 12 months with PSA    20 minutes spent on the date of the encounter doing chart review, history and exam, documentation and further activities as noted above.    Phu Conteh MD  Urology  [facility]        Again, thank you for allowing me to participate in the care of your patient.      Sincerely,    Phu Conteh MD    "

## 2023-06-22 NOTE — NURSING NOTE
Chief Complaint   Patient presents with     Elevated PSA     PSA check   Patient also needs sildenafil refilled.  Shonda Bermeo LPN

## 2023-06-22 NOTE — PROGRESS NOTES
"  CHIEF COMPLAINT   It was my pleasure to see Deven Hernandes who is a 67 year old male for follow-up of Elevated PSA.      HPI  Deven Hernandes is a very pleasant 67 year old male who presents with a history of Elevated PSA (Prostate Specific Antigen). He was noted at annual physical to have PSA to 5.55. MRI with PIRADS 2 lesion and he returns for PSA recheck. Now at 4.80. He has elected not to undergo standard TRUS given normal MRI and decline in PSA.     No family history of prostate cancer.  He reports increasing difficulty maintaining erections and he has tried sildenafil with modest effect.  We also discussed his worsening urinary symptoms of weak stream, urgency, and nocturia. This has improved with tamsulosin.     PSA  6/22/2023 - 4.80  7/29/2022 - 4.98  3/10/2022 - 5.90  1/24/2022 - 5.55  11/4/2020 - 4.05  10/22/2019 - 3.72     MRI Prostate 3/10/2022  Size: 49 grams  IMPRESSION:  1. Based on the most suspicious abnormality, this exam is  characterized as PIRADS 2 - Clinically significant cancer is unlikely  to be present.    2. No suspicious adenopathy or evidence of pelvic metastases.    PHYSICAL EXAM  Patient is a 67 year old  male   Vitals: Blood pressure 120/64, pulse 91, height 1.6 m (5' 3\"), weight 68 kg (150 lb), SpO2 96 %.  General Appearance Adult: Body mass index is 26.57 kg/m .  Alert, no acute distress, oriented  Lungs: no respiratory distress, or pursed lip breathing  Abdomen: soft, nontender, no organomegaly or masses  Back: no CVAT  Neuro: Alert, oriented, speech and mentation normal  Psych: affect and mood normal    Outside and Past Medical records:  Review of the result(s) of each unique test - PSA     ASSESSMENT and PLAN  67 year old male with elevated PSA. Up as high as 5.90, and now at 4.80. MRI with PIRADS 2 lesion. We again reviewed the nature of PSA elevation and that while the MRI is reassuring, it does not rule out prostate cancer. That said, he would prefer to defer biopsy at " this time, given stable and declining PSA numbers.  He has noted improvement with tamsulosin and will continue this. Also continues with sildenafil.     - Continue tamsulosin 0.4 mg daily  - Follow-up 12 months with PSA    20 minutes spent on the date of the encounter doing chart review, history and exam, documentation and further activities as noted above.    Phu Conteh MD  Urology  [facility]

## 2023-07-14 ENCOUNTER — THERAPY VISIT (OUTPATIENT)
Dept: PHYSICAL THERAPY | Facility: CLINIC | Age: 68
End: 2023-07-14
Payer: COMMERCIAL

## 2023-07-14 DIAGNOSIS — M54.41 ACUTE RIGHT-SIDED LOW BACK PAIN WITH RIGHT-SIDED SCIATICA: Primary | ICD-10-CM

## 2023-07-14 PROCEDURE — 97530 THERAPEUTIC ACTIVITIES: CPT | Mod: GP | Performed by: PHYSICAL THERAPIST

## 2023-07-14 PROCEDURE — 97110 THERAPEUTIC EXERCISES: CPT | Mod: GP | Performed by: PHYSICAL THERAPIST

## 2023-07-14 NOTE — PROGRESS NOTES
07/14/23 0500   Appointment Info   Signing clinician's name / credentials Albinarandall Hines PT   Total/Authorized Visits 3 per therapist   Visits Used 2   Medical Diagnosis s/p L3-5 laminectomy   PT Tx Diagnosis s/p L3-5 laminectomy   Quick Adds Certification   Progress Note/Certification   Start of Care Date 06/12/23   Onset of illness/injury or Date of Surgery 04/12/23   Therapy Frequency 1 x month   Predicted Duration 3 months   Certification date from 06/12/23   Certification date to 09/09/23   PT Goal 1   Goal Identifier ambulation   Goal Description minutes patient will be able to walk - 30   Rationale to maximize safety and independence within the community   Goal Progress minutes patient is able to walk without pain - 30   Target Date 09/04/23   Date Met 07/14/23   Subjective Report   Subjective Report Pt. feels he is gaining strength and reports improved walking tolerance. Pt. feels he is ready to continue his HEP on his own with no further PT visits needed unless increased problems arise.   Objective Measures   Objective Measures Objective Measure 1;Objective Measure 2   Objective Measure 1   Objective Measure ROM   Details lumbar flex normal; ext 90%   Objective Measure 2   Objective Measure strength   Details R DF 4-/5; EHL 4-/5; PF 4/5; hams 4+/5   Treatment Interventions (PT)   Interventions Therapeutic Procedure/Exercise;Therapeutic Activity   Therapeutic Procedure/Exercise   Therapeutic Procedures: strength, endurance, ROM, flexibillity minutes (70243) 20   Therapeutic Procedures Ther Proc 2;Ther Proc 3;Ther Proc 4;Ther Proc 5;Ther Proc 6   Ther Proc 1 heel/toe raise   Ther Proc 1 - Details 10 reps each - toe raise progress to SL   Ther Proc 2 ankle DF   Ther Proc 2 - Details Gr TB x 10 reps   Ther Proc 3 hams curl   Ther Proc 3 - Details Gr TB x 10 reps   Ther Proc 4 pelvic tilt   Ther Proc 4 - Details 10 reps   Ther Proc 5 bridge   Ther Proc 5 - Details 10 reps with alt LE kicks   Skilled  Intervention cueing for proper form/technique   Patient Response/Progress good tolerance   Ther Proc 6 supine abdom stab #3   Ther Proc 6 - Details 5 sec x 5   Therapeutic Activity   Therapeutic Activities: dynamic activities to improve functional performance minutes (60037) 10   Ther Act 1 Cont education in activity modification to reduce stress on injury. Instructed in parameters of frequency and duration of activities to progress activity safely.   Skilled Intervention education   Education   Learner/Method Patient;Listening;Demonstration   Plan   Plan for next session reassess strength; progress ex   Total Session Time   Timed Code Treatment Minutes 30   Total Treatment Time (sum of timed and untimed services) 30         DISCHARGE  Reason for Discharge: Patient has met all goals.        Discharge Plan: Patient to continue home program.    Referring Provider:  Tip Jeffries

## 2023-07-22 DIAGNOSIS — N52.01 ERECTILE DYSFUNCTION DUE TO ARTERIAL INSUFFICIENCY: ICD-10-CM

## 2023-08-06 RX ORDER — SILDENAFIL 100 MG/1
100 TABLET, FILM COATED ORAL DAILY PRN
Qty: 30 TABLET | Refills: 0 | OUTPATIENT
Start: 2023-08-06

## 2023-08-15 RX ORDER — SILDENAFIL 100 MG/1
100 TABLET, FILM COATED ORAL DAILY PRN
Qty: 30 TABLET | Refills: 10 | Status: SHIPPED | OUTPATIENT
Start: 2023-08-15

## 2023-09-13 NOTE — PROGRESS NOTES
Faxed Form April 13, 2023 to fax number [phone]    Right Fax confirmed at 11:53AM    Yesenia Waller      
Form completed. In Dr. Jeffries's mailbox to be signed.   
Signed, dated from faxed 4/18/23  
The [place] forms scanned into media.     Given to nurses to complete.  
No indicators present

## 2023-09-21 PROBLEM — M54.41 ACUTE RIGHT-SIDED LOW BACK PAIN WITH RIGHT-SIDED SCIATICA: Status: RESOLVED | Noted: 2023-06-15 | Resolved: 2023-09-21

## 2023-09-29 DIAGNOSIS — I10 ESSENTIAL HYPERTENSION, BENIGN: ICD-10-CM

## 2023-10-02 RX ORDER — LOSARTAN POTASSIUM 100 MG/1
TABLET ORAL
Qty: 90 TABLET | Refills: 1 | Status: SHIPPED | OUTPATIENT
Start: 2023-10-02 | End: 2024-03-25

## 2023-10-30 NOTE — TELEPHONE ENCOUNTER
I spoke to Mr. Hernandes today on telephone at 10 AM for clinical assessment.  Patient reported that he is doing well with gradual improvement in his strength.  He reports that his right leg pain has almost resolved with minimal residual tingling sensation.  He reports no new neurological symptoms.    He is back to work and able to carry out his activities of daily living without issues.  He is doing exercises at home by himself that were taught by physical therapist.  He reports no new bladder or bowel symptoms.    He also reports no issues with his incision.  He can follow-up with us on as-needed basis if there are any worsening symptoms or new onset neurological symptoms.  Patient sounded understanding.

## 2023-11-01 ENCOUNTER — TRANSFERRED RECORDS (OUTPATIENT)
Dept: HEALTH INFORMATION MANAGEMENT | Facility: CLINIC | Age: 68
End: 2023-11-01
Payer: COMMERCIAL

## 2023-11-02 DIAGNOSIS — K21.9 GASTROESOPHAGEAL REFLUX DISEASE WITHOUT ESOPHAGITIS: Primary | ICD-10-CM

## 2023-11-02 RX ORDER — OMEPRAZOLE 20 MG/1
20 TABLET, DELAYED RELEASE ORAL DAILY
Qty: 90 TABLET | Refills: 3 | Status: SHIPPED | OUTPATIENT
Start: 2023-11-02

## 2023-11-02 NOTE — TELEPHONE ENCOUNTER
Pt called the clinic requesting a script for a medication he has been taking OTC for 5-6 years.     He found that he can save money if he gets it from his PCP instead of OTC.     The medication he is requesting is omeprazole 20mg daily for acid reflux.   He would like it sent to the [facility] [address] in [place].     Routing to PCP for review, medication pended per pt request.     Can we leave a detailed message on this number? YES  Phone number patient can be reached at: Home number on file [phone] (home)    Vale Arce, RN  [facility] Triage

## 2023-12-27 DIAGNOSIS — I10 ESSENTIAL HYPERTENSION, BENIGN: ICD-10-CM

## 2023-12-27 DIAGNOSIS — E78.5 HYPERLIPIDEMIA LDL GOAL <130: ICD-10-CM

## 2023-12-27 RX ORDER — AMLODIPINE BESYLATE 10 MG/1
10 TABLET ORAL DAILY
Qty: 90 TABLET | Refills: 0 | Status: SHIPPED | OUTPATIENT
Start: 2023-12-27 | End: 2024-03-28

## 2023-12-27 RX ORDER — ATORVASTATIN CALCIUM 10 MG/1
10 TABLET, FILM COATED ORAL DAILY
Qty: 90 TABLET | Refills: 0 | Status: SHIPPED | OUTPATIENT
Start: 2023-12-27 | End: 2024-03-28

## 2023-12-27 NOTE — TELEPHONE ENCOUNTER
Prescription approved per [facility] Refill Protocol.  Denisse Mott, RN  [facility] Triage Nurse

## 2024-01-03 ENCOUNTER — OFFICE VISIT (OUTPATIENT)
Dept: FAMILY MEDICINE | Facility: CLINIC | Age: 69
End: 2024-01-03
Payer: COMMERCIAL

## 2024-01-03 VITALS
OXYGEN SATURATION: 96 % | WEIGHT: 160 LBS | DIASTOLIC BLOOD PRESSURE: 70 MMHG | RESPIRATION RATE: 20 BRPM | BODY MASS INDEX: 28.34 KG/M2 | SYSTOLIC BLOOD PRESSURE: 120 MMHG | HEART RATE: 110 BPM | TEMPERATURE: 98.2 F

## 2024-01-03 DIAGNOSIS — R21 RASH AND NONSPECIFIC SKIN ERUPTION: ICD-10-CM

## 2024-01-03 DIAGNOSIS — B49 FUNGAL INFECTION: Primary | ICD-10-CM

## 2024-01-03 PROCEDURE — 99213 OFFICE O/P EST LOW 20 MIN: CPT | Performed by: FAMILY MEDICINE

## 2024-01-03 RX ORDER — RESPIRATORY SYNCYTIAL VIRUS VACCINE 120MCG/0.5
0.5 KIT INTRAMUSCULAR ONCE
Qty: 1 EACH | Refills: 0 | Status: CANCELLED | OUTPATIENT
Start: 2024-01-03 | End: 2024-01-03

## 2024-01-03 RX ORDER — CLOTRIMAZOLE AND BETAMETHASONE DIPROPIONATE 10; .64 MG/G; MG/G
CREAM TOPICAL 2 TIMES DAILY
Qty: 45 G | Refills: 0 | Status: SHIPPED | OUTPATIENT
Start: 2024-01-03 | End: 2024-04-15

## 2024-01-03 ASSESSMENT — PAIN SCALES - GENERAL: PAINLEVEL: NO PAIN (0)

## 2024-01-03 NOTE — PROGRESS NOTES
"  Assessment & Plan     Fungal infection    - clotrimazole-betamethasone (LOTRISONE) 1-0.05 % external cream; Apply topically 2 times daily    Rash and nonspecific skin eruption    - clotrimazole-betamethasone (LOTRISONE) 1-0.05 % external cream; Apply topically 2 times daily      BMI:   Estimated body mass index is 28.34 kg/m  as calculated from the following:    Height as of 6/22/23: 1.6 m (5' 3\").    Weight as of this encounter: 72.6 kg (160 lb).     MD DAVID N[facility]OULAY[facility] Mora is a 68 year old, presenting for the following health issues:  Rash        1/3/2024     2:18 PM   Additional Questions   Roomed by Ade HERNANDEZ       Rash  Associated symptoms include a rash.   History of Present Illness       Reason for visit:  Rash on foot  Symptom onset:  More than a month  Symptoms include:  Itchy  Symptom intensity:  Moderate  Symptom progression:  Worsening  Had these symptoms before:  No  What makes it worse:  No  What makes it better:  Lotion    He eats 2-3 servings of fruits and vegetables daily.He consumes 1 sweetened beverage(s) daily.He exercises with enough effort to increase his heart rate 20 to 29 minutes per day.  He exercises with enough effort to increase his heart rate 6 days per week.   He is taking medications regularly.             Review of Systems   Skin:  Positive for rash.            Objective    /70   Pulse 110   Temp 98.2  F (36.8  C) (Tympanic)   Resp 20   Wt 72.6 kg (160 lb)   SpO2 96%   BMI 28.34 kg/m    Body mass index is 28.34 kg/m .  Physical Exam   Circular rash on the top of foot.              "

## 2024-01-08 ENCOUNTER — PATIENT OUTREACH (OUTPATIENT)
Dept: CARE COORDINATION | Facility: CLINIC | Age: 69
End: 2024-01-08
Payer: COMMERCIAL

## 2024-01-22 ENCOUNTER — PATIENT OUTREACH (OUTPATIENT)
Dept: CARE COORDINATION | Facility: CLINIC | Age: 69
End: 2024-01-22
Payer: COMMERCIAL

## 2024-03-24 DIAGNOSIS — I10 ESSENTIAL HYPERTENSION, BENIGN: ICD-10-CM

## 2024-03-25 RX ORDER — LOSARTAN POTASSIUM 100 MG/1
TABLET ORAL
Qty: 90 TABLET | Refills: 0 | Status: ON HOLD | OUTPATIENT
Start: 2024-03-25 | End: 2024-04-23

## 2024-03-28 DIAGNOSIS — I10 ESSENTIAL HYPERTENSION, BENIGN: ICD-10-CM

## 2024-03-28 DIAGNOSIS — E78.5 HYPERLIPIDEMIA LDL GOAL <130: ICD-10-CM

## 2024-03-28 RX ORDER — ATORVASTATIN CALCIUM 10 MG/1
10 TABLET, FILM COATED ORAL DAILY
Qty: 90 TABLET | Refills: 0 | Status: SHIPPED | OUTPATIENT
Start: 2024-03-28 | End: 2024-06-26

## 2024-03-28 RX ORDER — AMLODIPINE BESYLATE 10 MG/1
10 TABLET ORAL DAILY
Qty: 90 TABLET | Refills: 0 | Status: SHIPPED | OUTPATIENT
Start: 2024-03-28 | End: 2024-06-26

## 2024-04-06 ENCOUNTER — HEALTH MAINTENANCE LETTER (OUTPATIENT)
Age: 69
End: 2024-04-06

## 2024-04-15 ENCOUNTER — OFFICE VISIT (OUTPATIENT)
Dept: INTERNAL MEDICINE | Facility: CLINIC | Age: 69
End: 2024-04-15
Payer: COMMERCIAL

## 2024-04-15 VITALS
SYSTOLIC BLOOD PRESSURE: 108 MMHG | BODY MASS INDEX: 27.02 KG/M2 | HEIGHT: 63 IN | WEIGHT: 152.5 LBS | TEMPERATURE: 98 F | DIASTOLIC BLOOD PRESSURE: 68 MMHG | OXYGEN SATURATION: 98 % | HEART RATE: 92 BPM

## 2024-04-15 DIAGNOSIS — E66.01 MORBID OBESITY (H): ICD-10-CM

## 2024-04-15 DIAGNOSIS — J06.9 UPPER RESPIRATORY TRACT INFECTION, UNSPECIFIED TYPE: ICD-10-CM

## 2024-04-15 DIAGNOSIS — E78.5 HYPERLIPIDEMIA LDL GOAL <130: ICD-10-CM

## 2024-04-15 DIAGNOSIS — I10 ESSENTIAL HYPERTENSION, BENIGN: ICD-10-CM

## 2024-04-15 DIAGNOSIS — Z00.00 ENCOUNTER FOR MEDICARE ANNUAL WELLNESS EXAM: Primary | ICD-10-CM

## 2024-04-15 DIAGNOSIS — N40.1 BENIGN PROSTATIC HYPERPLASIA WITH LOWER URINARY TRACT SYMPTOMS, SYMPTOM DETAILS UNSPECIFIED: ICD-10-CM

## 2024-04-15 DIAGNOSIS — Z12.5 SCREENING PSA (PROSTATE SPECIFIC ANTIGEN): ICD-10-CM

## 2024-04-15 DIAGNOSIS — R21 RASH AND NONSPECIFIC SKIN ERUPTION: ICD-10-CM

## 2024-04-15 DIAGNOSIS — Z12.11 COLON CANCER SCREENING: ICD-10-CM

## 2024-04-15 LAB
ALBUMIN SERPL BCG-MCNC: 4.8 G/DL (ref 3.5–5.2)
ALP SERPL-CCNC: 85 U/L (ref 40–150)
ALT SERPL W P-5'-P-CCNC: 17 U/L (ref 0–70)
ANION GAP SERPL CALCULATED.3IONS-SCNC: 13 MMOL/L (ref 7–15)
AST SERPL W P-5'-P-CCNC: 18 U/L (ref 0–45)
BILIRUB SERPL-MCNC: 0.8 MG/DL
BUN SERPL-MCNC: 13 MG/DL (ref 8–23)
CALCIUM SERPL-MCNC: 9.7 MG/DL (ref 8.8–10.2)
CHLORIDE SERPL-SCNC: 102 MMOL/L (ref 98–107)
CHOLEST SERPL-MCNC: 136 MG/DL
CREAT SERPL-MCNC: 1 MG/DL (ref 0.67–1.17)
DEPRECATED HCO3 PLAS-SCNC: 20 MMOL/L (ref 22–29)
EGFRCR SERPLBLD CKD-EPI 2021: 81 ML/MIN/1.73M2
ERYTHROCYTE [DISTWIDTH] IN BLOOD BY AUTOMATED COUNT: 12 % (ref 10–15)
FASTING STATUS PATIENT QL REPORTED: YES
GLUCOSE SERPL-MCNC: 131 MG/DL (ref 70–99)
HCT VFR BLD AUTO: 42.8 % (ref 40–53)
HDLC SERPL-MCNC: 35 MG/DL
HGB BLD-MCNC: 14.7 G/DL (ref 13.3–17.7)
LDLC SERPL CALC-MCNC: 69 MG/DL
MCH RBC QN AUTO: 32.7 PG (ref 26.5–33)
MCHC RBC AUTO-ENTMCNC: 34.3 G/DL (ref 31.5–36.5)
MCV RBC AUTO: 95 FL (ref 78–100)
NONHDLC SERPL-MCNC: 101 MG/DL
PLATELET # BLD AUTO: 265 10E3/UL (ref 150–450)
POTASSIUM SERPL-SCNC: 3.5 MMOL/L (ref 3.4–5.3)
PROT SERPL-MCNC: 7.3 G/DL (ref 6.4–8.3)
RBC # BLD AUTO: 4.5 10E6/UL (ref 4.4–5.9)
SODIUM SERPL-SCNC: 135 MMOL/L (ref 135–145)
TRIGL SERPL-MCNC: 158 MG/DL
WBC # BLD AUTO: 6 10E3/UL (ref 4–11)

## 2024-04-15 PROCEDURE — G0439 PPPS, SUBSEQ VISIT: HCPCS | Performed by: INTERNAL MEDICINE

## 2024-04-15 PROCEDURE — 99213 OFFICE O/P EST LOW 20 MIN: CPT | Mod: 25 | Performed by: INTERNAL MEDICINE

## 2024-04-15 PROCEDURE — 36415 COLL VENOUS BLD VENIPUNCTURE: CPT | Performed by: INTERNAL MEDICINE

## 2024-04-15 PROCEDURE — 80061 LIPID PANEL: CPT | Performed by: INTERNAL MEDICINE

## 2024-04-15 PROCEDURE — 80053 COMPREHEN METABOLIC PANEL: CPT | Performed by: INTERNAL MEDICINE

## 2024-04-15 PROCEDURE — 85027 COMPLETE CBC AUTOMATED: CPT | Performed by: INTERNAL MEDICINE

## 2024-04-15 RX ORDER — CLOTRIMAZOLE AND BETAMETHASONE DIPROPIONATE 10; .64 MG/G; MG/G
CREAM TOPICAL 2 TIMES DAILY
Qty: 45 G | Refills: 1 | Status: SHIPPED | OUTPATIENT
Start: 2024-04-15 | End: 2024-04-30

## 2024-04-15 NOTE — PROGRESS NOTES
Preventive Care Visit  [facility]  Garrett Clifford MD, Internal Medicine  Apr 15, 2024      Assessment & Plan     Encounter for Medicare annual wellness exam  Advised and recommended updated shingles vaccine and RSV by the vaccine and pneumococcal vaccine.  - Comprehensive metabolic panel; Future  - CBC with platelets; Future    Essential hypertension, benign  Stable on therapy continuing with current medical recommendations  - Comprehensive metabolic panel; Future    Hyperlipidemia LDL goal <130  As ordered as fasting per routine  - Lipid panel reflex to direct LDL Non-fasting; Future    Obesity (BMI 35.0-39.9) with comorbidity (H)  Encouraged ongoing discussion and weight loss    Benign prostatic hyperplasia with lower urinary tract symptoms, symptom details unspecified  Stable and following up with urology as directed.  Continuing with Flomax therapy and as needed Viagra use.    Screening PSA (prostate specific antigen)  PSA followed and screening through his urologist    Colon cancer screening  Prior colonoscopy performed within 12 months and stable.    Upper respiratory tract infection, unspecified type  Suspect viral etiology.  Clear lungs on exam with stable O2 sats.  Supportive care has been recommended.    Rash and nonspecific skin eruption  Refilled per patient request.  Topical application as needed.  Discussed superficial skin lesion right forearm unchanged.  Discussed dermatology referral.  Suspect possible lipoma.  - clotrimazole-betamethasone (LOTRISONE) 1-0.05 % external cream; Apply topically 2 times daily  - OFFICE/OUTPT VISIT,EST,LEVL III      The longitudinal plan of care for the diagnosis(es)/condition(s) as documented were addressed during this visit. Due to the added complexity in care, I will continue to support Morgan in the subsequent management and with ongoing continuity of care.       BMI  Estimated body mass index is 27.45 kg/m  as calculated from the following:     "Height as of this encounter: 1.588 m (5' 2.5\").    Weight as of this encounter: 69.2 kg (152 lb 8 oz).       See Patient Instructions    Subjective   Morgan is a 68 year old, presenting for the following:  Wellness Visit    Deven is here for his physical.  He otherwise feels well.  He is getting over an upper respiratory infection that is been going on for a while.  He still has a little cough.  He has home tested negative for COVID.  He is improving.    He does request a refill of his topical cream that he is using for the rash on his lower extremity.  This is intermittent and resolves his symptoms.    He is also had a skin lesion that is been present on his right forearm that has not changed.  He would like this reviewed and discussed.    He is followed through the urology department for his prostate testing.    Health Care Directive  Patient does not have a Health Care Directive or Living Will: Discussed advance care planning with patient; however, patient declined at this time.          2/6/2023   General Health   How would you rate your overall physical health? Good         2/6/2023   Nutrition   At least 4 servings of fruits and vegetables/day Yes         2/6/2023   Exercise   Frequency of exercise: 4-5 days/week         1/3/2024   Social Factors   Worry food won't last until get money to buy more No   Food not last or not have enough money for food? No   Do you have housing?  Yes   Are you worried about losing your housing? No   Lack of transportation? No   Unable to get utilities (heat,electricity)? No         4/12/2024   Fall Risk   Fallen 2 or more times in the past year? No   Trouble with walking or balance? No          2/6/2023   Activities of Daily Living- Home Safety   Needs help with the following daily activites NO assistance is needed   Safety concerns in the home None of the above         6/24/2018   Dental   Dentist two times every year? Yes         2/6/2023   Hearing Screening   Hearing concerns? " Difficulty following a conversation in a noisy restaurant or crowded room           Today's PHQ-2 Score:       4/15/2024     7:08 AM   PHQ-2 (  [employer])   Q1: Little interest or pleasure in doing things 1   Q2: Feeling down, depressed or hopeless 1   PHQ-2 Score 2   Q1: Little interest or pleasure in doing things Several days   Q2: Feeling down, depressed or hopeless Several days   PHQ-2 Score 2           2023   Substance Use   Alcohol more than 3/day or more than 7/wk No     Social History     Tobacco Use    Smoking status: Former     Current packs/day: 0.00     Average packs/day: 0.3 packs/day for 20.0 years (5.0 ttl pk-yrs)     Types: Cigarettes     Start date: 1983     Quit date: 2003     Years since quittin.4    Smokeless tobacco: Never   Vaping Use    Vaping status: Never Used   Substance Use Topics    Alcohol use: Yes     Comment: socially    Drug use: No     Last PSA:   PSA   Date Value Ref Range Status   2020 4.05 (H) 0 - 4 ug/L Final     Comment:     Assay Method:  Chemiluminescence using Siemens Vista analyzer     Prostate Specific Antigen Screen   Date Value Ref Range Status   2022 5.55 (H) 0.00 - 4.00 ug/L Final     PSA Tumor Marker   Date Value Ref Range Status   2023 4.80 (H) 0.00 - 4.00 ug/L Final     ASCVD Risk   The 10-year ASCVD risk score (Francisco DK, et al., 2019) is: 12.8%    Values used to calculate the score:      Age: 68 years      Sex: Male      Is Non- : No      Diabetic: No      Tobacco smoker: No      Systolic Blood Pressure: 108 mmHg      Is BP treated: Yes      HDL Cholesterol: 61 mg/dL      Total Cholesterol: 181 mg/dL      Reviewed and updated as needed this visit by Provider                    Current providers sharing in care for this patient include:  Patient Care Team:  Garrett Clifford MD as PCP - General (Internal Medicine)  Stephen Szymanski MD as MD (Gastroenterology)  Garrett Clifford MD as Assigned  "PCP  Phu Conteh MD as MD (Urology)  Phu Conteh MD as Assigned Cancer Care Provider  Tip Jeffries MD as Assigned Neuroscience Provider    The following health maintenance items are reviewed in Epic and correct as of today:  Health Maintenance   Topic Date Due    ZOSTER IMMUNIZATION (1 of 2) Never done    RSV VACCINE (Pregnancy & 60+) (1 - 1-dose 60+ series) Never done    Pneumococcal Vaccine: 65+ Years (2 of 2 - PCV) 11/04/2021    MEDICARE ANNUAL WELLNESS VISIT  02/06/2024    LIPID  02/06/2024    ANNUAL REVIEW OF HM ORDERS  02/06/2024    FALL RISK ASSESSMENT  04/15/2025    GLUCOSE  04/13/2026    COLORECTAL CANCER SCREENING  11/01/2026    ADVANCE CARE PLANNING  02/06/2028    DTAP/TDAP/TD IMMUNIZATION (3 - Td or Tdap) 03/31/2033    HEPATITIS C SCREENING  Completed    PHQ-2 (once per calendar year)  Completed    INFLUENZA VACCINE  Completed    AORTIC ANEURYSM SCREENING (SYSTEM ASSIGNED)  Completed    COVID-19 Vaccine  Completed    IPV IMMUNIZATION  Aged Out    HPV IMMUNIZATION  Aged Out    MENINGITIS IMMUNIZATION  Aged Out    RSV MONOCLONAL ANTIBODY  Aged Out         Review of Systems  CONSTITUTIONAL: NEGATIVE for fever, chills, change in weight  EYES: NEGATIVE for vision changes or irritation  ENT/MOUTH: NEGATIVE for ear, mouth and throat problems  RESP: NEGATIVE for significant SOB  CV: NEGATIVE for chest pain, palpitations or peripheral edema  GI: NEGATIVE for nausea, abdominal pain, heartburn, or change in bowel habits  : NEGATIVE for frequency, dysuria, or hematuria  MUSCULOSKELETAL: NEGATIVE for significant arthralgias or myalgia  NEURO: NEGATIVE for weakness, dizziness or paresthesias  ENDOCRINE: NEGATIVE for temperature intolerance, skin/hair changes  HEME: NEGATIVE for bleeding problems  PSYCHIATRIC: NEGATIVE for changes in mood or affect     Objective    Exam  /68   Pulse 92   Temp 98  F (36.7  C)   Ht 1.588 m (5' 2.5\")   Wt 69.2 kg (152 lb 8 oz)   SpO2 98%  " " BMI 27.45 kg/m     Estimated body mass index is 27.45 kg/m  as calculated from the following:    Height as of this encounter: 1.588 m (5' 2.5\").    Weight as of this encounter: 69.2 kg (152 lb 8 oz).    Physical Exam  GENERAL: alert and no distress  EYES: Eyes grossly normal to inspection, PERRL and conjunctivae and sclerae normal  HENT: ear canals and TM's normal, nose and mouth without ulcers or lesions  RESP: lungs clear to auscultation - no rales, rhonchi or wheezes  CV: regular rate and rhythm, normal S1 S2, no S3 or S4, no murmur, click or rub, no peripheral edema  ABDOMEN: soft, nontender, no hepatosplenomegaly, no masses and bowel sounds normal  MS: no gross musculoskeletal defects noted, no edema  NEURO: No focal changes  PSYCH: mentation appears normal, affect normal/bright  Skin:  there is a slightly smaller than ping-pong ball size skin lesion noted on the superficial aspect of the right forearm.  This is soft nontender and nonadherent and freely mobile with no cellulitic change or skin break.        4/15/2024   Mini Cog   Clock Draw Score 2 Normal   3 Item Recall 3 objects recalled   Mini Cog Total Score 5            Signed Electronically by: Garrett Clifford MD    "

## 2024-04-21 ENCOUNTER — HOSPITAL ENCOUNTER (OUTPATIENT)
Facility: CLINIC | Age: 69
Setting detail: OBSERVATION
Discharge: HOME | End: 2024-04-23
Attending: EMERGENCY MEDICINE | Admitting: HOSPITALIST
Payer: COMMERCIAL

## 2024-04-21 ENCOUNTER — APPOINTMENT (OUTPATIENT)
Dept: GENERAL RADIOLOGY | Facility: CLINIC | Age: 69
End: 2024-04-21
Attending: EMERGENCY MEDICINE
Payer: COMMERCIAL

## 2024-04-21 DIAGNOSIS — R07.9 CHEST PAIN, UNSPECIFIED TYPE: ICD-10-CM

## 2024-04-21 DIAGNOSIS — I10 ESSENTIAL HYPERTENSION, BENIGN: ICD-10-CM

## 2024-04-21 DIAGNOSIS — S27.0XXA TRAUMATIC PNEUMOTHORAX, INITIAL ENCOUNTER: ICD-10-CM

## 2024-04-21 DIAGNOSIS — S22.42XA CLOSED FRACTURE OF MULTIPLE RIBS OF LEFT SIDE, INITIAL ENCOUNTER: ICD-10-CM

## 2024-04-21 DIAGNOSIS — E87.6 HYPOKALEMIA: ICD-10-CM
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
## 2024-04-21 DIAGNOSIS — R21 RASH AND NONSPECIFIC SKIN ERUPTION: ICD-10-CM

## 2024-04-21 LAB
ALBUMIN SERPL BCG-MCNC: 4 G/DL (ref 3.5–5.2)
ALP SERPL-CCNC: 92 U/L (ref 40–150)
ALT SERPL W P-5'-P-CCNC: 18 U/L (ref 0–70)
ANION GAP SERPL CALCULATED.3IONS-SCNC: 16 MMOL/L (ref 7–15)
AST SERPL W P-5'-P-CCNC: 19 U/L (ref 0–45)
ATRIAL RATE - MUSE: 76 BPM
BASOPHILS # BLD AUTO: 0.1 10E3/UL (ref 0–0.2)
BASOPHILS NFR BLD AUTO: 1 %
BILIRUB SERPL-MCNC: 0.3 MG/DL
BUN SERPL-MCNC: 10.5 MG/DL (ref 8–23)
CALCIUM SERPL-MCNC: 8.7 MG/DL (ref 8.8–10.2)
CHLORIDE SERPL-SCNC: 100 MMOL/L (ref 98–107)
CREAT SERPL-MCNC: 0.95 MG/DL (ref 0.67–1.17)
DEPRECATED HCO3 PLAS-SCNC: 19 MMOL/L (ref 22–29)
DIASTOLIC BLOOD PRESSURE - MUSE: NORMAL MMHG
EGFRCR SERPLBLD CKD-EPI 2021: 87 ML/MIN/1.73M2
EOSINOPHIL # BLD AUTO: 0.2 10E3/UL (ref 0–0.7)
EOSINOPHIL NFR BLD AUTO: 2 %
ERYTHROCYTE [DISTWIDTH] IN BLOOD BY AUTOMATED COUNT: 12.4 % (ref 10–15)
GLUCOSE SERPL-MCNC: 154 MG/DL (ref 70–99)
HCT VFR BLD AUTO: 35.1 % (ref 40–53)
HGB BLD-MCNC: 12.2 G/DL (ref 13.3–17.7)
HOLD SPECIMEN: NORMAL
HOLD SPECIMEN: NORMAL
IMM GRANULOCYTES # BLD: 0.1 10E3/UL
IMM GRANULOCYTES NFR BLD: 1 %
INTERPRETATION ECG - MUSE: NORMAL
LYMPHOCYTES # BLD AUTO: 1.2 10E3/UL (ref 0.8–5.3)
LYMPHOCYTES NFR BLD AUTO: 12 %
MCH RBC QN AUTO: 33.1 PG (ref 26.5–33)
MCHC RBC AUTO-ENTMCNC: 34.8 G/DL (ref 31.5–36.5)
MCV RBC AUTO: 95 FL (ref 78–100)
MONOCYTES # BLD AUTO: 0.9 10E3/UL (ref 0–1.3)
MONOCYTES NFR BLD AUTO: 9 %
NEUTROPHILS # BLD AUTO: 7.7 10E3/UL (ref 1.6–8.3)
NEUTROPHILS NFR BLD AUTO: 75 %
NRBC # BLD AUTO: 0 10E3/UL
NRBC BLD AUTO-RTO: 0 /100
P AXIS - MUSE: 40 DEGREES
PLATELET # BLD AUTO: 297 10E3/UL (ref 150–450)
POTASSIUM SERPL-SCNC: 3 MMOL/L (ref 3.4–5.3)
PR INTERVAL - MUSE: 178 MS
PROT SERPL-MCNC: 6 G/DL (ref 6.4–8.3)
QRS DURATION - MUSE: 94 MS
QT - MUSE: 374 MS
QTC - MUSE: 420 MS
R AXIS - MUSE: -41 DEGREES
RBC # BLD AUTO: 3.69 10E6/UL (ref 4.4–5.9)
SODIUM SERPL-SCNC: 135 MMOL/L (ref 135–145)
SYSTOLIC BLOOD PRESSURE - MUSE: NORMAL MMHG
T AXIS - MUSE: 73 DEGREES
TROPONIN T SERPL HS-MCNC: 11 NG/L
VENTRICULAR RATE- MUSE: 76 BPM
WBC # BLD AUTO: 10 10E3/UL (ref 4–11)

## 2024-04-21 PROCEDURE — 80053 COMPREHEN METABOLIC PANEL: CPT | Performed by: EMERGENCY MEDICINE

## 2024-04-21 PROCEDURE — 82607 VITAMIN B-12: CPT | Performed by: HOSPITALIST

## 2024-04-21 PROCEDURE — 99223 1ST HOSP IP/OBS HIGH 75: CPT | Performed by: HOSPITALIST

## 2024-04-21 PROCEDURE — 83735 ASSAY OF MAGNESIUM: CPT | Performed by: HOSPITALIST

## 2024-04-21 PROCEDURE — 85049 AUTOMATED PLATELET COUNT: CPT | Performed by: EMERGENCY MEDICINE

## 2024-04-21 PROCEDURE — 85045 AUTOMATED RETICULOCYTE COUNT: CPT | Performed by: HOSPITALIST

## 2024-04-21 PROCEDURE — 83550 IRON BINDING TEST: CPT | Performed by: HOSPITALIST

## 2024-04-21 PROCEDURE — 71046 X-RAY EXAM CHEST 2 VIEWS: CPT

## 2024-04-21 PROCEDURE — 82728 ASSAY OF FERRITIN: CPT | Performed by: HOSPITALIST

## 2024-04-21 PROCEDURE — 84484 ASSAY OF TROPONIN QUANT: CPT | Performed by: EMERGENCY MEDICINE

## 2024-04-21 PROCEDURE — 36415 COLL VENOUS BLD VENIPUNCTURE: CPT | Performed by: EMERGENCY MEDICINE

## 2024-04-21 PROCEDURE — 93005 ELECTROCARDIOGRAM TRACING: CPT

## 2024-04-21 PROCEDURE — 99285 EMERGENCY DEPT VISIT HI MDM: CPT | Mod: 25

## 2024-04-21 RX ORDER — ACETAMINOPHEN 500 MG
1000 TABLET ORAL ONCE
Status: COMPLETED | OUTPATIENT
Start: 2024-04-21 | End: 2024-04-22

## 2024-04-21 RX ORDER — POTASSIUM CHLORIDE 1500 MG/1
40 TABLET, EXTENDED RELEASE ORAL ONCE
Status: COMPLETED | OUTPATIENT
Start: 2024-04-21 | End: 2024-04-22

## 2024-04-21 ASSESSMENT — ACTIVITIES OF DAILY LIVING (ADL)
ADLS_ACUITY_SCORE: 38
ADLS_ACUITY_SCORE: 38

## 2024-04-21 ASSESSMENT — COLUMBIA-SUICIDE SEVERITY RATING SCALE - C-SSRS
1. IN THE PAST MONTH, HAVE YOU WISHED YOU WERE DEAD OR WISHED YOU COULD GO TO SLEEP AND NOT WAKE UP?: NO
6. HAVE YOU EVER DONE ANYTHING, STARTED TO DO ANYTHING, OR PREPARED TO DO ANYTHING TO END YOUR LIFE?: NO
2. HAVE YOU ACTUALLY HAD ANY THOUGHTS OF KILLING YOURSELF IN THE PAST MONTH?: NO

## 2024-04-21 NOTE — LETTER
[facility] EXTENDED RECOVERY AND SHORT STAY  [address] [address]  Phone: [phone]    April 23, 2024        Deven Hernandes  [address] [address]          To whom it may concern:    RE: Deven Hernandes    Patient was admitted to the hospital from 4/21/24-4/23/24. Please excuse the patient from work during those days.     Patient may return to work 4/30/24 with the following: Light duty (no lifting or strenuous activity for 8 weeks. Patient can return to a normal work schedule with no restrictions on 6/19/24.     Please contact me for questions or concerns.      Sincerely,        Nahomi Mojica MD  Hospitalist Service  [facility]

## 2024-04-22 ENCOUNTER — APPOINTMENT (OUTPATIENT)
Dept: GENERAL RADIOLOGY | Facility: CLINIC | Age: 69
End: 2024-04-22
Attending: HOSPITALIST
Payer: COMMERCIAL

## 2024-04-22 LAB
ANION GAP SERPL CALCULATED.3IONS-SCNC: 13 MMOL/L (ref 7–15)
BUN SERPL-MCNC: 7.7 MG/DL (ref 8–23)
CALCIUM SERPL-MCNC: 8.6 MG/DL (ref 8.8–10.2)
CHLORIDE SERPL-SCNC: 103 MMOL/L (ref 98–107)
CREAT SERPL-MCNC: 0.73 MG/DL (ref 0.67–1.17)
DEPRECATED HCO3 PLAS-SCNC: 20 MMOL/L (ref 22–29)
EGFRCR SERPLBLD CKD-EPI 2021: >90 ML/MIN/1.73M2
ERYTHROCYTE [DISTWIDTH] IN BLOOD BY AUTOMATED COUNT: 12.2 % (ref 10–15)
FERRITIN SERPL-MCNC: 64 NG/ML (ref 31–409)
FOLATE SERPL-MCNC: 13.1 NG/ML (ref 4.6–34.8)
GLUCOSE SERPL-MCNC: 103 MG/DL (ref 70–99)
HCT VFR BLD AUTO: 35.8 % (ref 40–53)
HGB BLD-MCNC: 12.5 G/DL (ref 13.3–17.7)
IRON BINDING CAPACITY (ROCHE): 310 UG/DL (ref 240–430)
IRON SATN MFR SERPL: 16 % (ref 15–46)
IRON SERPL-MCNC: 49 UG/DL (ref 61–157)
MAGNESIUM SERPL-MCNC: 1.6 MG/DL (ref 1.7–2.3)
MCH RBC QN AUTO: 33 PG (ref 26.5–33)
MCHC RBC AUTO-ENTMCNC: 34.9 G/DL (ref 31.5–36.5)
MCV RBC AUTO: 95 FL (ref 78–100)
PLATELET # BLD AUTO: 336 10E3/UL (ref 150–450)
POTASSIUM SERPL-SCNC: 3.3 MMOL/L (ref 3.4–5.3)
POTASSIUM SERPL-SCNC: 3.4 MMOL/L (ref 3.4–5.3)
POTASSIUM SERPL-SCNC: 3.4 MMOL/L (ref 3.4–5.3)
RBC # BLD AUTO: 3.79 10E6/UL (ref 4.4–5.9)
RETICS # AUTO: 0.06 10E6/UL (ref 0.03–0.1)
RETICS/RBC NFR AUTO: 1.5 % (ref 0.5–2)
SODIUM SERPL-SCNC: 136 MMOL/L (ref 135–145)
VIT B12 SERPL-MCNC: 477 PG/ML (ref 232–1245)
WBC # BLD AUTO: 12.3 10E3/UL (ref 4–11)

## 2024-04-22 PROCEDURE — 250N000013 HC RX MED GY IP 250 OP 250 PS 637: Performed by: STUDENT IN AN ORGANIZED HEALTH CARE EDUCATION/TRAINING PROGRAM

## 2024-04-22 PROCEDURE — 99418 PROLNG IP/OBS E/M EA 15 MIN: CPT | Performed by: STUDENT IN AN ORGANIZED HEALTH CARE EDUCATION/TRAINING PROGRAM

## 2024-04-22 PROCEDURE — G0378 HOSPITAL OBSERVATION PER HR: HCPCS

## 2024-04-22 PROCEDURE — 250N000013 HC RX MED GY IP 250 OP 250 PS 637: Performed by: THORACIC SURGERY (CARDIOTHORACIC VASCULAR SURGERY)

## 2024-04-22 PROCEDURE — 250N000013 HC RX MED GY IP 250 OP 250 PS 637: Performed by: HOSPITALIST

## 2024-04-22 PROCEDURE — 36415 COLL VENOUS BLD VENIPUNCTURE: CPT | Performed by: HOSPITALIST

## 2024-04-22 PROCEDURE — 82746 ASSAY OF FOLIC ACID SERUM: CPT | Performed by: HOSPITALIST

## 2024-04-22 PROCEDURE — 258N000003 HC RX IP 258 OP 636: Performed by: EMERGENCY MEDICINE

## 2024-04-22 PROCEDURE — 36415 COLL VENOUS BLD VENIPUNCTURE: CPT | Performed by: STUDENT IN AN ORGANIZED HEALTH CARE EDUCATION/TRAINING PROGRAM

## 2024-04-22 PROCEDURE — 84132 ASSAY OF SERUM POTASSIUM: CPT | Performed by: STUDENT IN AN ORGANIZED HEALTH CARE EDUCATION/TRAINING PROGRAM

## 2024-04-22 PROCEDURE — 71045 X-RAY EXAM CHEST 1 VIEW: CPT

## 2024-04-22 PROCEDURE — 80048 BASIC METABOLIC PNL TOTAL CA: CPT | Performed by: HOSPITALIST

## 2024-04-22 PROCEDURE — 99233 SBSQ HOSP IP/OBS HIGH 50: CPT | Performed by: STUDENT IN AN ORGANIZED HEALTH CARE EDUCATION/TRAINING PROGRAM

## 2024-04-22 PROCEDURE — 85027 COMPLETE CBC AUTOMATED: CPT | Performed by: HOSPITALIST

## 2024-04-22 PROCEDURE — 99222 1ST HOSP IP/OBS MODERATE 55: CPT | Performed by: THORACIC SURGERY (CARDIOTHORACIC VASCULAR SURGERY)

## 2024-04-22 PROCEDURE — 250N000013 HC RX MED GY IP 250 OP 250 PS 637: Performed by: EMERGENCY MEDICINE

## 2024-04-22 RX ORDER — DIPHENHYDRAMINE HCL 25 MG
25 CAPSULE ORAL EVERY 6 HOURS PRN
Status: DISCONTINUED | OUTPATIENT
Start: 2024-04-22 | End: 2024-04-23 | Stop reason: HOSPADM

## 2024-04-22 RX ORDER — NALOXONE HYDROCHLORIDE 0.4 MG/ML
0.4 INJECTION, SOLUTION INTRAMUSCULAR; INTRAVENOUS; SUBCUTANEOUS
Status: DISCONTINUED | OUTPATIENT
Start: 2024-04-22 | End: 2024-04-23 | Stop reason: HOSPADM

## 2024-04-22 RX ORDER — AMOXICILLIN 250 MG
2 CAPSULE ORAL 2 TIMES DAILY PRN
Status: DISCONTINUED | OUTPATIENT
Start: 2024-04-22 | End: 2024-04-23 | Stop reason: HOSPADM

## 2024-04-22 RX ORDER — OXYCODONE HYDROCHLORIDE 5 MG/1
5 TABLET ORAL EVERY 4 HOURS PRN
Status: DISCONTINUED | OUTPATIENT
Start: 2024-04-22 | End: 2024-04-23 | Stop reason: HOSPADM

## 2024-04-22 RX ORDER — AMOXICILLIN 250 MG
1 CAPSULE ORAL 2 TIMES DAILY PRN
Status: DISCONTINUED | OUTPATIENT
Start: 2024-04-22 | End: 2024-04-23 | Stop reason: HOSPADM

## 2024-04-22 RX ORDER — BENZONATATE 100 MG/1
100 CAPSULE ORAL 3 TIMES DAILY PRN
Status: DISCONTINUED | OUTPATIENT
Start: 2024-04-22 | End: 2024-04-23 | Stop reason: HOSPADM

## 2024-04-22 RX ORDER — ONDANSETRON 2 MG/ML
4 INJECTION INTRAMUSCULAR; INTRAVENOUS EVERY 6 HOURS PRN
Status: DISCONTINUED | OUTPATIENT
Start: 2024-04-22 | End: 2024-04-23 | Stop reason: HOSPADM

## 2024-04-22 RX ORDER — IBUPROFEN 600 MG/1
600 TABLET, FILM COATED ORAL EVERY 6 HOURS
Status: DISCONTINUED | OUTPATIENT
Start: 2024-04-22 | End: 2024-04-23 | Stop reason: HOSPADM

## 2024-04-22 RX ORDER — POTASSIUM CHLORIDE 1500 MG/1
40 TABLET, EXTENDED RELEASE ORAL ONCE
Status: COMPLETED | OUTPATIENT
Start: 2024-04-22 | End: 2024-04-22

## 2024-04-22 RX ORDER — PANTOPRAZOLE SODIUM 40 MG/1
40 TABLET, DELAYED RELEASE ORAL DAILY
Status: DISCONTINUED | OUTPATIENT
Start: 2024-04-22 | End: 2024-04-23 | Stop reason: HOSPADM

## 2024-04-22 RX ORDER — ACETAMINOPHEN 325 MG/1
650 TABLET ORAL EVERY 4 HOURS PRN
Status: DISCONTINUED | OUTPATIENT
Start: 2024-04-22 | End: 2024-04-22

## 2024-04-22 RX ORDER — ACETAMINOPHEN 650 MG/1
650 SUPPOSITORY RECTAL EVERY 4 HOURS PRN
Status: DISCONTINUED | OUTPATIENT
Start: 2024-04-22 | End: 2024-04-22

## 2024-04-22 RX ORDER — ACETAMINOPHEN 325 MG/1
975 TABLET ORAL 3 TIMES DAILY
Status: DISCONTINUED | OUTPATIENT
Start: 2024-04-22 | End: 2024-04-23 | Stop reason: HOSPADM

## 2024-04-22 RX ORDER — HYDROMORPHONE HCL IN WATER/PF 6 MG/30 ML
0.4 PATIENT CONTROLLED ANALGESIA SYRINGE INTRAVENOUS
Status: DISCONTINUED | OUTPATIENT
Start: 2024-04-22 | End: 2024-04-23 | Stop reason: HOSPADM

## 2024-04-22 RX ORDER — NALOXONE HYDROCHLORIDE 0.4 MG/ML
0.2 INJECTION, SOLUTION INTRAMUSCULAR; INTRAVENOUS; SUBCUTANEOUS
Status: DISCONTINUED | OUTPATIENT
Start: 2024-04-22 | End: 2024-04-23 | Stop reason: HOSPADM

## 2024-04-22 RX ORDER — CODEINE PHOSPHATE AND GUAIFENESIN 10; 100 MG/5ML; MG/5ML
5 SOLUTION ORAL EVERY 4 HOURS PRN
Status: DISCONTINUED | OUTPATIENT
Start: 2024-04-22 | End: 2024-04-23 | Stop reason: HOSPADM

## 2024-04-22 RX ORDER — ONDANSETRON 4 MG/1
4 TABLET, ORALLY DISINTEGRATING ORAL EVERY 6 HOURS PRN
Status: DISCONTINUED | OUTPATIENT
Start: 2024-04-22 | End: 2024-04-23 | Stop reason: HOSPADM

## 2024-04-22 RX ORDER — TAMSULOSIN HYDROCHLORIDE 0.4 MG/1
0.4 CAPSULE ORAL DAILY
Status: DISCONTINUED | OUTPATIENT
Start: 2024-04-22 | End: 2024-04-23 | Stop reason: HOSPADM

## 2024-04-22 RX ORDER — DIPHENHYDRAMINE HYDROCHLORIDE 50 MG/ML
25 INJECTION INTRAMUSCULAR; INTRAVENOUS EVERY 6 HOURS PRN
Status: DISCONTINUED | OUTPATIENT
Start: 2024-04-22 | End: 2024-04-23 | Stop reason: HOSPADM

## 2024-04-22 RX ADMIN — ACETAMINOPHEN 975 MG: 325 TABLET, FILM COATED ORAL at 13:43

## 2024-04-22 RX ADMIN — PANTOPRAZOLE SODIUM 40 MG: 40 TABLET, DELAYED RELEASE ORAL at 08:24

## 2024-04-22 RX ADMIN — IBUPROFEN 600 MG: 600 TABLET ORAL at 23:32

## 2024-04-22 RX ADMIN — ACETAMINOPHEN 1000 MG: 500 TABLET, FILM COATED ORAL at 00:30

## 2024-04-22 RX ADMIN — HYDROMORPHONE HYDROCHLORIDE 1 MG: 2 TABLET ORAL at 12:40

## 2024-04-22 RX ADMIN — OXYCODONE HYDROCHLORIDE 5 MG: 5 TABLET ORAL at 19:34

## 2024-04-22 RX ADMIN — ACETAMINOPHEN 975 MG: 325 TABLET, FILM COATED ORAL at 11:19

## 2024-04-22 RX ADMIN — HYDROMORPHONE HYDROCHLORIDE 1 MG: 2 TABLET ORAL at 08:24

## 2024-04-22 RX ADMIN — POTASSIUM CHLORIDE 40 MEQ: 1500 TABLET, EXTENDED RELEASE ORAL at 12:40

## 2024-04-22 RX ADMIN — POTASSIUM CHLORIDE 40 MEQ: 1500 TABLET, EXTENDED RELEASE ORAL at 00:29

## 2024-04-22 RX ADMIN — SODIUM CHLORIDE 1000 ML: 9 INJECTION, SOLUTION INTRAVENOUS at 00:36

## 2024-04-22 RX ADMIN — ACETAMINOPHEN 975 MG: 325 TABLET, FILM COATED ORAL at 19:34

## 2024-04-22 RX ADMIN — TAMSULOSIN HYDROCHLORIDE 0.4 MG: 0.4 CAPSULE ORAL at 08:24

## 2024-04-22 RX ADMIN — POTASSIUM CHLORIDE 40 MEQ: 1500 TABLET, EXTENDED RELEASE ORAL at 18:35

## 2024-04-22 ASSESSMENT — ACTIVITIES OF DAILY LIVING (ADL)
ADLS_ACUITY_SCORE: 36
ADLS_ACUITY_SCORE: 34
ADLS_ACUITY_SCORE: 36
ADLS_ACUITY_SCORE: 38
ADLS_ACUITY_SCORE: 34
ADLS_ACUITY_SCORE: 34
ADLS_ACUITY_SCORE: 38
ADLS_ACUITY_SCORE: 34
ADLS_ACUITY_SCORE: 36
ADLS_ACUITY_SCORE: 38
ADLS_ACUITY_SCORE: 34
ADLS_ACUITY_SCORE: 34
ADLS_ACUITY_SCORE: 36
ADLS_ACUITY_SCORE: 38
ADLS_ACUITY_SCORE: 34
ADLS_ACUITY_SCORE: 34
ADLS_ACUITY_SCORE: 38
ADLS_ACUITY_SCORE: 36
ADLS_ACUITY_SCORE: 34
ADLS_ACUITY_SCORE: 34
ADLS_ACUITY_SCORE: 38

## 2024-04-22 NOTE — ED NOTES
Bed: ED07  Expected date:   Expected time:   Means of arrival:   Comments:  Selam [age]M chest pain, ?allergic reaction

## 2024-04-22 NOTE — PROGRESS NOTES
Admission/Transfer from: home  2 RN skin assessment completed. Yes  Significant findings include: n/a  WOC Nurse Consult Ordered? No

## 2024-04-22 NOTE — PHARMACY-ADMISSION MEDICATION HISTORY
Pharmacist Admission Medication History    Admission medication history is complete. The information provided in this note is only as accurate as the sources available at the time of the update.    Information Source(s): Patient and CareEverywhere/SureScripts via phone        Changes made to PTA medication list:  Added: None  Deleted: None  Changed: Lotrisone --> as needed    Allergies reviewed with patient and updates made in EHR: no    Medication History Completed By: Juany Mathews, PharmD 4/22/2024 8:23 AM    PTA Med List   Medication Sig Last Dose    acetaminophen (TYLENOL) 500 MG tablet Take 500-1,000 mg by mouth every 6 hours as needed for mild pain prn med    amLODIPine (NORVASC) 10 MG tablet TAKE ONE TABLET BY MOUTH ONE TIME DAILY 4/21/2024    atorvastatin (LIPITOR) 10 MG tablet Take 1 tablet (10 mg) by mouth daily 4/21/2024    clotrimazole-betamethasone (LOTRISONE) 1-0.05 % external cream Apply topically 2 times daily (Patient taking differently: Apply topically 2 times daily as needed) prn med    losartan (COZAAR) 100 MG tablet Take 1 tablet (100 mg) by mouth daily 4/21/2024    MULTIVITAMIN CHEW   OR Take 1 tablet by mouth daily 4/21/2024    omeprazole (PRILOSEC OTC) 20 MG EC tablet Take 1 tablet (20 mg) by mouth daily 4/21/2024    sildenafil (VIAGRA) 100 MG tablet Take 1 tablet (100 mg) by mouth daily as needed (sexual activity) prn med    tamsulosin (FLOMAX) 0.4 MG capsule Take 1 capsule (0.4 mg) by mouth daily 4/21/2024

## 2024-04-22 NOTE — ED NOTES
[facility]  ED Nurse Handoff Report    ED Chief complaint: Chest Pain      ED Diagnosis:   Final diagnoses:   Closed fracture of multiple ribs of left side, initial encounter   Fall, initial encounter   Chest pain, unspecified type   Traumatic pneumothorax, initial encounter       Code Status: Per admitting    Allergies:   Allergies   Allergen Reactions    Amoxicillin Itching and Swelling    Lansoprazole Diarrhea    Lisinopril Cough    Morphine And Related      gi    Niacin Other (See Comments)     heartburn    Penicillins     Verapamil Other (See Comments)     Constipation         Patient Story:   Pt presents to the ED with chest pain. Troponin and EKG negative. Chest x-ray showed a small pneumothorax and some broken ribs. Pt had a fall today. Plan to admit for observation.     Focused Assessment:    Neuro: Alert, oriented x 4  Respiratory:Pt has bronchitis, small pneumothorax. 2L nasal cannula, non-rebreather requested to support lung  Cardiology:  NSR, chest pain   Gastrointestinal: soft, non tender, non distended   Genitourinary/Renal:    Musculoskeletal: moves all extremities   Skin: Intact skin, redness/hives on chest and upper back  Lines: 18G RAC    Labs Ordered and Resulted from Time of ED Arrival to Time of ED Departure   COMPREHENSIVE METABOLIC PANEL - Abnormal       Result Value    Sodium 135      Potassium 3.0 (*)     Carbon Dioxide (CO2) 19 (*)     Anion Gap 16 (*)     Urea Nitrogen 10.5      Creatinine 0.95      GFR Estimate 87      Calcium 8.7 (*)     Chloride 100      Glucose 154 (*)     Alkaline Phosphatase 92      AST 19      ALT 18      Protein Total 6.0 (*)     Albumin 4.0      Bilirubin Total 0.3     CBC WITH PLATELETS AND DIFFERENTIAL - Abnormal    WBC Count 10.0      RBC Count 3.69 (*)     Hemoglobin 12.2 (*)     Hematocrit 35.1 (*)     MCV 95      MCH 33.1 (*)     MCHC 34.8      RDW 12.4      Platelet Count 297      % Neutrophils 75      % Lymphocytes 12      % Monocytes 9       % Eosinophils 2      % Basophils 1      % Immature Granulocytes 1      NRBCs per 100 WBC 0      Absolute Neutrophils 7.7      Absolute Lymphocytes 1.2      Absolute Monocytes 0.9      Absolute Eosinophils 0.2      Absolute Basophils 0.1      Absolute Immature Granulocytes 0.1      Absolute NRBCs 0.0     TROPONIN T, HIGH SENSITIVITY - Normal    Troponin T, High Sensitivity 11          Chest XR,  PA & LAT   Final Result   IMPRESSION: Ill-defined streaky/tiny patchy opacities in the left lower lung, possibly atelectasis but a mild infectious process cannot be excluded. A small left apical pneumothorax is present. Mildly displaced acute fractures involving at least the left    lateral fifth and sixth ribs. No pleural effusion. No right pneumothorax. Heart size and pulmonary vascularity are normal. No mediastinal shift.      Finding of small left apical pneumothorax and left rib fractures without mediastinal shift relayed to Dr. Joshi at 11:00 PM on 04/21/2024.            Treatments and/or interventions provided:      Medications   potassium chloride mike ER (KLOR-CON M20) CR tablet 40 mEq (has no administration in time range)   acetaminophen (TYLENOL) tablet 1,000 mg (has no administration in time range)        Patient's response to treatments and/or interventions:   Resting comfortably    To be done/followed up on inpatient unit:    See any in-patient orders    Does this patient have any cognitive concerns?:  none    Activity level - Baseline/Home:    Independent    Activity Level - Current:     Stand with Assist    Patient's Preferred language: English     Needed?: No    Isolation: None  Infection: Not Applicable  Patient tested for COVID 19 prior to admission: NO    Bariatric?: No    Vital Signs:   Vitals:    04/21/24 2300 04/21/24 2302 04/21/24 2317 04/21/24 2330   BP: 100/59 100/59  109/67   Pulse: 80 84  89   Resp: 18 17  18   Temp:       TempSrc:       SpO2: 92%  96% 96%   Weight:       Height:            Cardiac Rhythm: nsr    Was the PSS-3 completed:   Yes    Family Comments: none present    For the majority of the shift this patient's behavior was Green.   Behavioral interventions performed were none    ED NURSE PHONE NUMBER: *[id number]

## 2024-04-22 NOTE — PROGRESS NOTES
Observation goals  PRIOR TO DISCHARGE        Comments:   -diagnostic tests and consults completed and resulted- not met   -vital signs normal or at patient baseline- met  Nurse to notify provider when observation goals have been met and patient is ready for discharge.

## 2024-04-22 NOTE — PLAN OF CARE
PRIMARY Concern: Chest/rib pain-- pneumothorax, URI  SAFETY RISK Concerns (fall risk, behaviors, etc.): Fall risk      Isolation/Type: NA  Tests/Procedures for NEXT shift: AM labs, K redraw @1730  Consults? (Pending/following, signed-off?) Cardio/thoracic surgery consulted   Where is patient from? (Home, TCU, etc.): Home  Other Important info for NEXT shift: RN managed K, CXR showed L rib fx and trace pneumo remaining   Anticipated DC date & active delays: 4/23?-- pending pain levels   _____________________________________________________________________________  SUMMARY NOTE:  Orientation/Cognitive: A/Ox4  Observation Goals (Met/ Not Met): Not met   Mobility Level/Assist Equipment: SBA   Antibiotics & Plan (IV/po, length of tx left): NA   Pain Management: PO dilaudid x2  Tele/VS/O2: VSS RA, ex tachy at times, Tele- SR/sinus tachy  ABNL Lab/BG: WBC 12.3, Hgb 12.5  Diet: Reg  Bowel/Bladder: Cont  Skin Concerns: None  Drains/Devices: PIV SL  Patient Stated Goal for Today: Pain control  Observation goals  PRIOR TO DISCHARGE        Comments:   -diagnostic tests and consults completed and resulted- not met   -vital signs normal or at patient baseline- met  Nurse to notify provider when observation goals have been met and patient is ready for discharge.

## 2024-04-22 NOTE — ED TRIAGE NOTES
Pt BIBA for chest pain with sudden onset that radiates to the back of the left shoulder. Pt also has some hives on his chest and back. Pt was having some difficulty breathing for EMS. Pt has recent dx of bronchitis a week ago. Pt took 325mg ASA at home, was given 0.4 mg nitroglycerin by EMS sublingual which dropped his Bps to the 80s, given 500mL NS to correct BP, and 50mg of IV Benadryl. Benadryl improves respiratory effort and O2 sats. Pt recently started a new cream for itching (clotrimazole and betamethasone dipropionate cream). EKG negative for EMS, no cardiac hx. Pt reported having a fall today during a coughing fit and says he may have LOC for a couple seconds. Pt does not recall hitting his head or any injury.     Triage Assessment (Adult)       Row Name 04/21/24 0138          Triage Assessment    Airway WDL WDL        Respiratory WDL    Respiratory WDL WDL        Skin Circulation/Temperature WDL    Skin Circulation/Temperature WDL X   hives on chest and back        Cardiac WDL    Cardiac WDL X;chest pain        Chest Pain Assessment    Chest Pain Location midsternal;shoulder, left        Peripheral/Neurovascular WDL    Peripheral Neurovascular WDL WDL        Cognitive/Neuro/Behavioral WDL    Cognitive/Neuro/Behavioral WDL WDL

## 2024-04-22 NOTE — H&P
[facility]    History and Physical  Hospitalist     Date of Admission:  4/21/2024    Primary Care Physician   Garrett Clifford    Chief Complaint   Chest Pain    History obtained from the patient and the medical chart    History of Present Illness   Deven Hernandes is a 68 year old male with a past medical history of htn, gerd, hld, and prediabetes presents to the hospital with chest pain after a syncopal episode. The patient reports that he has had a URI for the last two weeks with frequent coughing spellings. His cough is dry and does not improve with over the counter antitussives. He had a coughing spelling on 4/21 which was severe resulting in him passive out. He woke up on the ground with left sided chest pain. He called ems and was brought to the hospital. En route to the hospital EMS administered aspiring 325mg, and nitroglycerin. Additionally they noted that he had hives on his back and chest and administered 50mg iv benadryl. After the nitroglycerin the patient became hypotensive with sbp in the 80s, 500ml NS was administered with improved of his symptoms. By the time he arrived in the er the er provider could not find any hives. The patient reports that his chest pain is pleuritic and located on the left side. It is worse with movement. Work up in the er was significant for two rib fractures, and a small left apical pneumothorax. During my interview the patient reports that his pain is well controlled. He has no other injuries. Reports pruritus over his legs, but his hives and rash have resolved.   Work up in the er was also significant for anemia which appears to be new from labs drawn earlier this month. The patient denies any bleeding, melena, or blood in his stool. He reports a prior history of iron deficiency anemia in 2018 thought to be due to upper gi blood loss. He underwent an egd at that time which was significant for esophagitis. He is currently on a pta PPI. He  underwent a colonoscopy in 2023 which was significant for polyps and diverticulosis.     Past Medical History    I have reviewed this patient's medical history and updated it with pertinent information if needed.   Past Medical History:   Diagnosis Date    Esophageal reflux     Essential hypertension, benign     Hyperglycemia     Hyperlipidemia     Mumps     Tobacco use disorder     dced 2003       Past Surgical History   I have reviewed this patient's surgical history and updated it with pertinent information if needed.  Past Surgical History:   Procedure Laterality Date    COLONOSCOPY  04/19/2017    DISCECTOMY LUMBAR MINIMALLY INVASIVE ONE LEVEL Right 4/12/2023    Procedure: MINIMALLY INVASIVE RIGHT LUMBAR 3 TO LUMBAR 4 DISCECTOMY USING MICROSCOPE;  Surgeon: Tip Jeffries MD;  Location:  OR    ESOPHAGOSCOPY, GASTROSCOPY, DUODENOSCOPY (EGD), COMBINED N/A 07/31/2018    Procedure: COMBINED ESOPHAGOSCOPY, GASTROSCOPY, DUODENOSCOPY (EGD), BIOPSY SINGLE OR MULTIPLE;  gastroscopy;  Surgeon: Stephen Skelton MD;  Location:  GI    TONSILLECTOMY      as a child    [facility] NONSPECIFIC PROCEDURE      tonsillectomy    [facility] NONSPECIFIC PROCEDURE  2001    nl colonoscopy       Allergies   Allergies   Allergen Reactions    Amoxicillin Itching and Swelling    Lansoprazole Diarrhea    Lisinopril Cough    Morphine And Related      gi    Niacin Other (See Comments)     heartburn    Penicillins     Verapamil Other (See Comments)     Constipation         Social History   I have reviewed this patient's social history and updated it with pertinent information if needed. Deven Hernandes  reports that he quit smoking about 20 years ago. His smoking use included cigarettes. He started smoking about 40 years ago. He has a 5 pack-year smoking history. He has never used smokeless tobacco. He reports current alcohol use. He reports that he does not use drugs.    Family History   I have reviewed this patient's family history and updated it with  pertinent information if needed.   Family History   Problem Relation Age of Onset    Respiratory Mother         b:1926   emphysema, ?heart problems, HTN    Hypertension Mother     Cerebrovascular Disease Mother     Cancer Father         d:age 82   colon cancer    Colon Cancer Father     Family History Negative Brother         b:1948    Diabetes Brother     Hypertension Brother     Arthritis Sister         b:1950   unsure of type    Diabetes Cousin        Physical Exam   Temp: 98  F (36.7  C) Temp src: Oral BP: 100/59 Pulse: 84   Resp: 17 SpO2: 96 % O2 Device: None (Room air)    Vital Signs with Ranges  Temp:  [98  F (36.7  C)] 98  F (36.7  C)  Pulse:  [76-84] 84  Resp:  [15-18] 17  BP: (100-113)/(59-66) 100/59  SpO2:  [92 %-96 %] 96 %  152 lbs 0 oz  Physical Exam  Vitals reviewed.   Constitutional:       Appearance: Normal appearance.      Comments: Pleasant well-developed well-nourished man seen resting in bed comfortably no apparent distress in the emergency room.   HENT:      Head: Normocephalic and atraumatic.   Eyes:      Extraocular Movements: Extraocular movements intact.      Conjunctiva/sclera: Conjunctivae normal.      Pupils: Pupils are equal, round, and reactive to light.   Cardiovascular:      Rate and Rhythm: Normal rate and regular rhythm.      Comments: Chest tenderness to palpation on the left  Pulmonary:      Effort: Pulmonary effort is normal.      Breath sounds: Normal breath sounds.   Abdominal:      General: Abdomen is flat. Bowel sounds are normal.      Palpations: Abdomen is soft.   Neurological:      General: No focal deficit present.      Mental Status: He is alert and oriented to person, place, and time. Mental status is at baseline.         Assessment & Plan   Deven Hernandes is a 68 year old male with a past medical history of htn, hld, gerd, prediabetes presents to the hospital with a pneumothorax.     Pneumothorax  Syncope  URI  Patient presented to the hospital with chest pain after  a syncopal episode with coughing at home. He has pleuritic chest pain on the left with tenderness to palpation consistent with cxr findings to two rib fractures. Additionally he was found to have a small left apical pneumothorax. His case was discussed with thoracic surgery in the er who recommended monitoring overnight, supplemental O2 and repeat cxr in the morning.   -Non-rebreather  -Follow up am cxr  -Pain control    Normocytic anemia  gerd  New finding since 4/15. He denies any bleeding, hematochezia or melena.  Had iron deficeincy anemia in 2018 and underwent egd which found esophagitis. He underwent a colonoscopy in 2023 which was significant for colonic polyps and diverticulosis.   -Follow-up iron profile, B12, folate  -Follow-up repeat hemoglobin morning  -c/w pta ppi    Hypokalemia  Replace per protocol    Rash  Reportedly had hives over his chest when ems arrived. 50mg of iv benadryl was administered. No significant rash present on arrival to the ed.     Chronic medical conditions:   Htn  Hld  Prediabetes  Bph -flomax    DVT ppx: ambualte  Expected length of stay less than 2 days  Code Status: full code        Medical Decision Making       75 MINUTES SPENT BY ME on the date of service doing chart review, history, exam, documentation & further activities per the note.

## 2024-04-22 NOTE — PLAN OF CARE
Goal Outcome Evaluation:    PRIMARY Concern: Chest/rib pain-- pneumothorax, URI  SAFETY RISK Concerns (fall risk, behaviors, etc.): Fall risk      Isolation/Type: NA  Tests/Procedures for NEXT shift: AM labs, K redraw @5783  Consults? (Pending/following, signed-off?) Cardio/thoracic surgery s/o   Where is patient from? (Home, TCU, etc.): Home  Other Important info for NEXT shift: RN managed K, CXR showed L rib fx and trace pneumo remaining. IS in room, instructions provided.  Anticipated DC date & active delays: 4/23?-- wants to leave early in the morning   _____________________________________________________________________________  SUMMARY NOTE:  Orientation/Cognitive: A/Ox4  Observation Goals (Met/ Not Met): Not met   Mobility Level/Assist Equipment: SBA   Antibiotics & Plan (IV/po, length of tx left): NA   Pain Management: PRN PO dilaudid x2. Schd tylenol and ibuprofen. PRN oxycodone available   Tele/VS/O2: VSS RA, ex tachy at times, Tele- SR/sinus tachy   ABNL Lab/BG: WBC 12.3, Hgb 12.5, K 3.3  Diet: Reg  Bowel/Bladder: Cont  Skin Concerns: None  Drains/Devices: PIV SL  Patient Stated Goal for Today: Pain control  Observation goals  PRIOR TO DISCHARGE        Comments:   -diagnostic tests and consults completed and resulted- not met   -vital signs normal or at patient baseline- met  Nurse to notify provider when observation goals have been met and patient is ready for discharge.

## 2024-04-22 NOTE — CONSULTS
THORACIC SURGERY CONSULT NOTE    Consult Reason: Rib fractures, pneumothorax    HPI: This is a 68 year old man who had a coughing spell and passed out, falling onto his chest. He sustained two left rib fractures and small pneumothorax. His pain is largely controlled now.    A/P: Patient is a 68 year old male with traumatic rib fractures and small pneumothorax, which is already almost gone.  -Pain control with oral dilaudid as needed and scheduled tylenol and ibuprofen  - Incentive spirometry  - Ambulation  - I recommend that he does not fly next week on vacation, but rather wait two weeks at least to decrease risk of pneumothorax  - I reassured him the pain will improve but may take up to 8 weeks. It is reasonable to be off work for 8 weeks and no strenuous activity for that period of time.    Patient and plan discussed with Dr. Mojica    Thank you for the opportunity to participate in the care of this patient.    PMH:  Past Medical History:   Diagnosis Date    Esophageal reflux     Essential hypertension, benign     Hyperglycemia     Hyperlipidemia     Mumps     Tobacco use disorder     dced 2003         PSH:  Past Surgical History:   Procedure Laterality Date    COLONOSCOPY  04/19/2017    DISCECTOMY LUMBAR MINIMALLY INVASIVE ONE LEVEL Right 4/12/2023    Procedure: MINIMALLY INVASIVE RIGHT LUMBAR 3 TO LUMBAR 4 DISCECTOMY USING MICROSCOPE;  Surgeon: Tip Jeffries MD;  Location:  OR    ESOPHAGOSCOPY, GASTROSCOPY, DUODENOSCOPY (EGD), COMBINED N/A 07/31/2018    Procedure: COMBINED ESOPHAGOSCOPY, GASTROSCOPY, DUODENOSCOPY (EGD), BIOPSY SINGLE OR MULTIPLE;  gastroscopy;  Surgeon: Stephen Skelton MD;  Location:  GI    TONSILLECTOMY      as a child    [facility] NONSPECIFIC PROCEDURE      tonsillectomy    [facility] NONSPECIFIC PROCEDURE  2001    nl colonoscopy         FH:  family history includes Arthritis in his sister; Cancer in his father; Cerebrovascular Disease in his mother; Colon Cancer in his father; Diabetes in his  brother and cousin; Family History Negative in his brother; Hypertension in his brother and mother; Respiratory in his mother.      SH:  Social History     Tobacco Use    Smoking status: Former     Current packs/day: 0.00     Average packs/day: 0.3 packs/day for 20.0 years (5.0 ttl pk-yrs)     Types: Cigarettes     Start date: 1983     Quit date: 2003     Years since quittin.4    Smokeless tobacco: Never   Vaping Use    Vaping status: Never Used   Substance Use Topics    Alcohol use: Yes     Comment: socially    Drug use: No        Allergies:  Allergies   Allergen Reactions    Amoxicillin Itching and Swelling    Lansoprazole Diarrhea    Lisinopril Cough    Morphine And Related      gi    Niacin Other (See Comments)     heartburn    Penicillins     Verapamil Other (See Comments)     Constipation         Home Meds:  Medications Prior to Admission   Medication Sig Dispense Refill Last Dose    acetaminophen (TYLENOL) 500 MG tablet Take 500-1,000 mg by mouth every 6 hours as needed for mild pain   prn med    amLODIPine (NORVASC) 10 MG tablet TAKE ONE TABLET BY MOUTH ONE TIME DAILY 90 tablet 0 2024    atorvastatin (LIPITOR) 10 MG tablet Take 1 tablet (10 mg) by mouth daily 90 tablet 0 2024    clotrimazole-betamethasone (LOTRISONE) 1-0.05 % external cream Apply topically 2 times daily (Patient taking differently: Apply topically 2 times daily as needed) 45 g 1 prn med    losartan (COZAAR) 100 MG tablet Take 1 tablet (100 mg) by mouth daily 90 tablet 0 2024    MULTIVITAMIN CHEW   OR Take 1 tablet by mouth daily   2024    omeprazole (PRILOSEC OTC) 20 MG EC tablet Take 1 tablet (20 mg) by mouth daily 90 tablet 3 2024    sildenafil (VIAGRA) 100 MG tablet Take 1 tablet (100 mg) by mouth daily as needed (sexual activity) 30 tablet 10 prn med    tamsulosin (FLOMAX) 0.4 MG capsule Take 1 capsule (0.4 mg) by mouth daily 90 capsule 3 2024       Physical Exam:  Temp:  [97.2  F (36.2   C)-98.5  F (36.9  C)] 97.2  F (36.2  C)  Pulse:  [] 95  Resp:  [14-24] 16  BP: ()/(59-79) 108/72  SpO2:  [92 %-98 %] 93 %    Gen: NAD, resting comfortably in bed  Lungs: non-labored breathing  CV: regular rhythm, normal rate  Neuro: AOx3    Labs:  CBC  Recent Labs   Lab 04/22/24  0644 04/21/24 2146   WBC 12.3* 10.0   HGB 12.5* 12.2*    297     BMP  Recent Labs   Lab 04/22/24  1758 04/22/24  0644 04/21/24 2146   NA  --  136 135   POTASSIUM 3.3* 3.4  3.4 3.0*   CHLORIDE  --  103 100   CO2  --  20* 19*   BUN  --  7.7* 10.5   CR  --  0.73 0.95   GLC  --  103* 154*     LFT  Recent Labs   Lab 04/21/24 2146   AST 19   ALT 18   ALKPHOS 92   BILITOTAL 0.3   ALBUMIN 4.0       Imaging:  As above          Herbie Armenta MD

## 2024-04-22 NOTE — PROGRESS NOTES
RECEIVING UNIT ED HANDOFF REVIEW    ED Nurse Handoff Report was reviewed by: Michael Domingo RN on April 22, 2024 at 5:35 AM

## 2024-04-22 NOTE — PROGRESS NOTES
"[facility]  Hospitalist Progress Note    Assessment & Plan   Deven Hernandes is a 68 year old male with a past medical history of htn, hld, gerd, prediabetes s who was admitted on 4/21/24 for pneumothorax.      Pneumothorax  Syncope  URI  Left 5th and 6th Rib Fractures  Patient presented to the hospital with chest pain after a syncopal episode with coughing at home. He has pleuritic chest pain on the left with tenderness to palpation consistent with cxr findings to two rib fractures. Additionally he was found to have a small left apical pneumothorax.      - CXR on 4/22/24: Trace residual left apical pneumothorax improved from previous   - Pain control    -Thoracic Surgery consulted      Normocytic anemia  gerd  New finding since 4/15. He denies any bleeding, hematochezia or melena.  Had iron deficeincy anemia in 2018 and underwent egd which found esophagitis. He underwent a colonoscopy in 2023 which was significant for colonic polyps and diverticulosis.    - Hgb: 12.2 > 12.5    - Iron Studies show low iron otherwise WNL    - Folate: WNL    - B12: Pending      - Continue PPI     Hypokalemia: Resolved  - Replace per protocol     Rash: Resolved   Reportedly had hives over his chest when ems arrived. 50mg of iv benadryl was administered. No significant rash present on arrival to the ed.      Chronic medical conditions:   HTN    - BP's have been okay here    - Hold home Amlodipine     HLD  Prediabetes  BPH   - Continue home Flomax    Clinically Significant Risk Factors Present on Admission        # Hypokalemia: Lowest K = 3 mmol/L in last 2 days, will replace as needed     # Hypomagnesemia: Lowest Mg = 1.6 mg/dL in last 2 days, will replace as needed       # Hypertension: Noted on problem list      # Overweight: Estimated body mass index is 26.93 kg/m  as calculated from the following:    Height as of this encounter: 1.6 m (5' 3\").    Weight as of this encounter: 68.9 kg (152 lb).          "       Diet: Regular Diet Adult     DVT Prophylaxis: Ambulate every shift   Pete Catheter: Not present  Lines: None     Cardiac Monitoring: ACTIVE order. Indication: Syncope- low cardiac risk (24 hours)  Code Status: Full Code      Disposition Plan       Expected Discharge Date: 04/23/2024        Discharge Comments: Thoracic surgery consult  PT?      Entered: Nahomi Mojica MD 04/22/2024, 1:17 PM     Notes Reviewed: H&P    Care Team Updated: Yes    Disposition: Likely tomorrow pending pain control and thoracic recommendations       Medically Ready for Discharge: Anticipated Tomorrow        Nahomi Mojica MD  Hospitalist Service   [facility]  Securely message with the Vocera Web Console (learn more here)         Medical Decision Making       60 MINUTES SPENT BY ME on the date of service doing chart review, history, exam, documentation & further activities per the note.           Interval History     No acute overnight events.     This morning, the patient states that he is doing okay. He states that he is having pain where his rib fractures are. Denies SOB.     -Data reviewed today: I reviewed all new labs and imaging results over the last 24 hours.     Physical Exam   Temp: 97.7  F (36.5  C) Temp src: Oral BP: 120/74 Pulse: 94   Resp: 14 SpO2: 94 % O2 Device: None (Room air) Oxygen Delivery: 3 LPM  Vitals:    04/21/24 2135   Weight: 68.9 kg (152 lb)     Vital Signs with Ranges  Temp:  [97.7  F (36.5  C)-98.5  F (36.9  C)] 97.7  F (36.5  C)  Pulse:  [] 94  Resp:  [14-24] 14  BP: ()/(59-79) 120/74  SpO2:  [92 %-98 %] 94 %  No intake/output data recorded.      Constitutional: Awake, alert, cooperative, no apparent distress.    HEENT: PERRL, Normocephalic, without obvious abnormality, atraumatic, oral pharynx with moist mucus membranes  Pulmonary: No increased work of breathing, good air exchange, clear to auscultation bilaterally, no crackles or  wheezing.  Cardiovascular: Regular rate and rhythm, normal S1 and S2  Chest Wall: Tenderness over left upper ribs   GI: Normal bowel sounds, soft, non-distended, non-tender.  Skin/Integumen: Visualized skin appeared clear.  Neuro: CN II-XII grossly intact.  Psych:  Alert and oriented x 3. Normal affect.  Extremities: No lower extremity edema noted      Medications   Current Facility-Administered Medications   Medication Dose Route Frequency Provider Last Rate Last Admin     Current Facility-Administered Medications   Medication Dose Route Frequency Provider Last Rate Last Admin    acetaminophen (TYLENOL) tablet 975 mg  975 mg Oral TID Nahomi Mojica MD   975 mg at 04/22/24 1119    pantoprazole (PROTONIX) EC tablet 40 mg  40 mg Oral Daily Connie Augustin MD   40 mg at 04/22/24 0824    tamsulosin (FLOMAX) capsule 0.4 mg  0.4 mg Oral Daily Connie Augustin MD   0.4 mg at 04/22/24 0824       Data   Recent Labs   Lab 04/22/24  0644 04/21/24  2146   WBC 12.3* 10.0   HGB 12.5* 12.2*   MCV 95 95    297    135   POTASSIUM 3.4  3.4 3.0*   CHLORIDE 103 100   CO2 20* 19*   BUN 7.7* 10.5   CR 0.73 0.95   ANIONGAP 13 16*   KERRY 8.6* 8.7*   * 154*   ALBUMIN  --  4.0   PROTTOTAL  --  6.0*   BILITOTAL  --  0.3   ALKPHOS  --  92   ALT  --  18   AST  --  19       Recent Results (from the past 24 hour(s))   Chest XR,  PA & LAT    Narrative    EXAM: XR CHEST 2 VIEWS  LOCATION: [facility]  DATE: 4/21/2024    INDICATION: Chest pain  COMPARISON: 11/05/2022      Impression    IMPRESSION: Ill-defined streaky/tiny patchy opacities in the left lower lung, possibly atelectasis but a mild infectious process cannot be excluded. A small left apical pneumothorax is present. Mildly displaced acute fractures involving at least the left   lateral fifth and sixth ribs. No pleural effusion. No right pneumothorax. Heart size and pulmonary vascularity are normal. No mediastinal  shift.    Finding of small left apical pneumothorax and left rib fractures without mediastinal shift relayed to Dr. Joshi at 11:00 PM on 04/21/2024.   XR Chest Port 1 View    Narrative    CHEST ONE VIEW  April 22, 2024 8:53 AM     HISTORY: Follow up pneumothorax.    COMPARISON: April 21, 2024.      Impression    IMPRESSION: Trace residual left apical pneumothorax improved from  previous. Right lung clear.    AMBIKA PEREIRA MD         SYSTEM ID:  [id number]

## 2024-04-22 NOTE — ED PROVIDER NOTES
History     Chief Complaint:  Chest Pain       The history is provided by the patient.      Deven Hernandes is a 68 year old male with a history of hypertension presenting to the ED for evaluation of chest pain. The patient reports that he has been experiencing non-radiating left sided chest pain for the last two hours. He has had bronchitis with associated cough for about two weeks and he fell in the kitchen today after having a coughing fit.  He is unsure if he briefly lost consciousness but fell and struck his chest and has been having chest pain since that time.  He denies lower leg edema, back pain, abdominal pain, trouble swallowing, shortness of breath, or a history of stent or bypass, high cholesterol, or diabetes. The patient used to smoke two packs a week for about 20 years.  Patient took 325 mg aspirin at home and received nitroglycerin from EMS as well as 500 cc normal saline, which helped his chest pain a bit. EMS also reported concern for hives on his chest and back, and the patient endorses a slight rash on his left foot; he was provided 50 mg IV Benadryl.  Patient denies ongoing rash or itching.  Patient reports drinking 3 alcoholic beverages today.    Independent Historian:   None - Patient Only    Review of External Notes:   None    Medications:    Tylenol  Norvasc  Lipitor  Cozaar  Prilosec  Viagra  Flomax    Past Medical History:    Esophageal reflux  Essential hypertension  Hyperglycemia  Hyperlipidemia  Mumps  Tobacco use disorder  Plantar fascial fibromatosis  Elevated prostate specific antigen  Erectile dysfunction  Lumbar radiculopathy  GERD  Benign prostatic hyperplasia    Past Surgical History:    Colonoscopy  Discectomy  Esophagogastroduodenoscopy  Tonsillectomy  Colonoscopy    Physical Exam   Patient Vitals for the past 24 hrs:   BP Temp Temp src Pulse Resp SpO2 Height Weight   04/22/24 0030 95/76 -- -- 96 24 97 % -- --   04/22/24 0000 104/65 -- -- 90 14 94 % -- --   04/21/24 2330  "109/67 -- -- 89 18 96 % -- --   04/21/24 2317 -- -- -- -- -- 96 % -- --   04/21/24 2302 100/59 -- -- 84 17 -- -- --   04/21/24 2300 100/59 -- -- 80 18 92 % -- --   04/21/24 2135 113/66 98  F (36.7  C) Oral 76 15 95 % 1.6 m (5' 3\") 68.9 kg (152 lb)      Physical Exam    General: Alert and cooperative with exam. Patient in mild  distress. Normal mentation.  Head:  Scalp is NC/AT  Eyes:  No scleral icterus, PERRL  ENT:  The external nose and ears are normal. The oropharynx is normal and without erythema; mucus membranes are moist. Uvula midline, no evidence of deep space infection.  Neck:  Normal range of motion without rigidity.  CV:  Regular rate and rhythm    No pathologic murmur   Resp:  Breath sounds are clear bilaterally    Non-labored, no retractions or accessory muscle use  GI:  Abdomen is soft, no distension, no tenderness. No peritoneal signs  MS:  No lower extremity edema. Easily reproducible chest wall pain to palpation; greatest on the left side  Skin:  Warm and dry, No rash or lesions noted.  Neuro: Oriented x 3. No gross motor deficits.    Emergency Department Course   ECG  ECG results from 04/21/24   EKG 12-lead, tracing only     Value    Systolic Blood Pressure     Diastolic Blood Pressure     Ventricular Rate 76    Atrial Rate 76    NV Interval 178    QRS Duration 94        QTc 420    P Axis 40    R AXIS -41    T Axis 73    Interpretation ECG      Sinus rhythm  Left axis deviation  Low voltage QRS  Abnormal ECG  No previous ECGs available  Confirmed by GENERATED REPORT, COMPUTER ([id number]),  Brina Dong ([id number]) on 4/21/2024 9:50:47 PM       Imaging:  Chest XR,  PA & LAT   Final Result   IMPRESSION: Ill-defined streaky/tiny patchy opacities in the left lower lung, possibly atelectasis but a mild infectious process cannot be excluded. A small left apical pneumothorax is present. Mildly displaced acute fractures involving at least the left    lateral fifth and sixth ribs. No pleural " effusion. No right pneumothorax. Heart size and pulmonary vascularity are normal. No mediastinal shift.      Finding of small left apical pneumothorax and left rib fractures without mediastinal shift relayed to Dr. Joshi at 11:00 PM on 04/21/2024.      Report per radiology.     Laboratory:  Labs Ordered and Resulted from Time of ED Arrival to Time of ED Departure   COMPREHENSIVE METABOLIC PANEL - Abnormal       Result Value    Sodium 135      Potassium 3.0 (*)     Carbon Dioxide (CO2) 19 (*)     Anion Gap 16 (*)     Urea Nitrogen 10.5      Creatinine 0.95      GFR Estimate 87      Calcium 8.7 (*)     Chloride 100      Glucose 154 (*)     Alkaline Phosphatase 92      AST 19      ALT 18      Protein Total 6.0 (*)     Albumin 4.0      Bilirubin Total 0.3     CBC WITH PLATELETS AND DIFFERENTIAL - Abnormal    WBC Count 10.0      RBC Count 3.69 (*)     Hemoglobin 12.2 (*)     Hematocrit 35.1 (*)     MCV 95      MCH 33.1 (*)     MCHC 34.8      RDW 12.4      Platelet Count 297      % Neutrophils 75      % Lymphocytes 12      % Monocytes 9      % Eosinophils 2      % Basophils 1      % Immature Granulocytes 1      NRBCs per 100 WBC 0      Absolute Neutrophils 7.7      Absolute Lymphocytes 1.2      Absolute Monocytes 0.9      Absolute Eosinophils 0.2      Absolute Basophils 0.1      Absolute Immature Granulocytes 0.1      Absolute NRBCs 0.0     TROPONIN T, HIGH SENSITIVITY - Normal    Troponin T, High Sensitivity 11     MAGNESIUM      Procedures   None    Emergency Department Course & Assessments:    Interventions:  Medications   potassium chloride mike ER (KLOR-CON M20) CR tablet 40 mEq (40 mEq Oral $Given 4/22/24 0029)   acetaminophen (TYLENOL) tablet 1,000 mg (1,000 mg Oral $Given 4/22/24 0030)   sodium chloride 0.9% BOLUS 1,000 mL (1,000 mLs Intravenous $New Bag 4/22/24 0036)        Independent Interpretation (X-rays, CTs, rhythm strip):  Chest x-ray: Small left apical pneumothorax with left-sided rib  fracture    Assessments/Consultations/Discussion of Management or Tests:   ED Course as of 04/22/24 0122   Sun Apr 21, 2024 2144 I obtained history and examined the patient as noted above.     2302 I spoke with [facility] regarding the patient's imaging results.      2327 I rechecked the patient.    2331 I spoke with Dr. Augustin, hospitalist, regarding the patient's history and presentation in the emergency department today. Dr. Augustin accepted the patient for admission.      Spoke with on-call thoracic surgery    Social Determinants of Health affecting care:   None    Disposition:  The patient was admitted to the hospital under the care of Dr. Augustin.     Impression & Plan    CMS Diagnoses: None     Medical Decision Making:  Patient is a 68-year-old male presents with 2-week history of cough and coughing episode which resulted in fall with associated chest injury and chest pain.  Patient's medical history and records were reviewed.  On evaluation patient has reproducible chest wall tenderness.  EKG without evidence of acute ischemia, infarction, or significant arrhythmia.  Labs notable for mild hypokalemia (potassium 3.0; provided oral replacement), elevated anion gap (16; patient does endorse alcohol use today though denies concern for alcohol abuse or withdrawal symptoms), and normal troponin.  Chest x-ray demonstrates small left apical pneumothorax with at least 2 left-sided rib fractures.  No evidence of flail chest.  Patient's work of breathing is normal.  I did discuss imaging findings with on-call thoracic surgery who is in agreement with deferring chest tube placement at this time given small pneumothorax.  Patient admitted to observation with the hospitalist service.  Patient oxygenating appropriately on room air though was placed on supplemental oxygen.  Provided Tylenol for pain.  Patient did receive Benadryl from EMS due to concern for potential hives to chest though patient denies ongoing  rash or associated itching; no evidence of rash, allergic reaction or other concerning findings on exam.  Admitted to the hospitalist service.  Remained stable throughout my care.    Diagnosis:    ICD-10-CM    1. Closed fracture of multiple ribs of left side, initial encounter  S22.42XA       2. Fall, initial encounter  W19.XXXA       3. Chest pain, unspecified type  R07.9       4. Traumatic pneumothorax, initial encounter  S27.0XXA       5. Hypokalemia  E87.6            Scribe Disclosure:  I, Regina Thompson, am serving as a scribe at 10:13 PM on 4/21/2024 to document services personally performed by Wolf Burgess DO based on my observations and the provider's statements to me.     4/21/2024   Wolf Burgess Christopher Warren,   04/22/24 0128

## 2024-04-23 VITALS
HEART RATE: 93 BPM | WEIGHT: 152 LBS | BODY MASS INDEX: 26.93 KG/M2 | HEIGHT: 63 IN | TEMPERATURE: 97.6 F | DIASTOLIC BLOOD PRESSURE: 77 MMHG | SYSTOLIC BLOOD PRESSURE: 115 MMHG | RESPIRATION RATE: 17 BRPM | OXYGEN SATURATION: 93 %

## 2024-04-23 LAB — POTASSIUM SERPL-SCNC: 3.8 MMOL/L (ref 3.4–5.3)

## 2024-04-23 PROCEDURE — 250N000013 HC RX MED GY IP 250 OP 250 PS 637: Performed by: HOSPITALIST

## 2024-04-23 PROCEDURE — 250N000013 HC RX MED GY IP 250 OP 250 PS 637: Performed by: THORACIC SURGERY (CARDIOTHORACIC VASCULAR SURGERY)

## 2024-04-23 PROCEDURE — 250N000013 HC RX MED GY IP 250 OP 250 PS 637: Performed by: STUDENT IN AN ORGANIZED HEALTH CARE EDUCATION/TRAINING PROGRAM

## 2024-04-23 PROCEDURE — G0378 HOSPITAL OBSERVATION PER HR: HCPCS

## 2024-04-23 PROCEDURE — 99239 HOSP IP/OBS DSCHRG MGMT >30: CPT | Performed by: STUDENT IN AN ORGANIZED HEALTH CARE EDUCATION/TRAINING PROGRAM

## 2024-04-23 RX ORDER — LOSARTAN POTASSIUM 100 MG/1
100 TABLET ORAL DAILY
COMMUNITY
Start: 2024-04-23 | End: 2024-04-30

## 2024-04-23 RX ORDER — OXYCODONE HYDROCHLORIDE 5 MG/1
5 TABLET ORAL EVERY 8 HOURS PRN
Qty: 12 TABLET | Refills: 0 | Status: SHIPPED | OUTPATIENT
Start: 2024-04-23 | End: 2024-04-30

## 2024-04-23 RX ORDER — IBUPROFEN 600 MG/1
600 TABLET, FILM COATED ORAL EVERY 6 HOURS PRN
Qty: 40 TABLET | Refills: 0 | Status: SHIPPED | OUTPATIENT
Start: 2024-04-23

## 2024-04-23 RX ADMIN — ACETAMINOPHEN 975 MG: 325 TABLET, FILM COATED ORAL at 07:50

## 2024-04-23 RX ADMIN — PANTOPRAZOLE SODIUM 40 MG: 40 TABLET, DELAYED RELEASE ORAL at 07:50

## 2024-04-23 RX ADMIN — TAMSULOSIN HYDROCHLORIDE 0.4 MG: 0.4 CAPSULE ORAL at 07:50

## 2024-04-23 RX ADMIN — IBUPROFEN 600 MG: 600 TABLET ORAL at 04:49

## 2024-04-23 RX ADMIN — OXYCODONE HYDROCHLORIDE 5 MG: 5 TABLET ORAL at 00:23

## 2024-04-23 ASSESSMENT — ACTIVITIES OF DAILY LIVING (ADL)
ADLS_ACUITY_SCORE: 36

## 2024-04-23 NOTE — PROGRESS NOTES
Observation goals  PRIOR TO DISCHARGE        Comments: -diagnostic tests and consults completed and resulted:met  -vital signs normal or at patient baseline:Met  Nurse to notify provider when observation goals have been met and patient is ready for discharge.

## 2024-04-23 NOTE — PROGRESS NOTES
"Care Coordination:      Apr 30, 2024  3:00 PM  (Arrive by 2:45 PM)  ED/Hospital Follow Up with Garrett Clifford MD  [facility] ([facility] ) [address] [place] [address] [phone]   Please plan to arrive at the clinic at your \"Arrive By\" time for your appointment. Our late policy will be enforced based on this time.     An appointment was made for this patient.    Leonora Ledezma RN  BSN, Care Coordinator    [facility]/ Care Transitions          [email or web address]    "

## 2024-04-23 NOTE — DISCHARGE SUMMARY
[facility]  Hospitalist Discharge Summary     Admit Date:  4/21/2024  Discharge Date:     4/23/2024 11:16 AM  Discharging Provider: Nahomi Mojica MD    PRIMARY CARE PROVIDER:    Garrett Clifford     DISCHARGE DIAGNOSES:     Left Sided Pneumothorax: Resolving   Syncope  URI  Left 5th and 6th Rib Fractures  Normocytic anemia  GERD  Hypokalemia: Resolved   Rash: Resolved   HTN   HLD  Prediabetes  BPH    BRIEF HISTORY OF PRESENT ILLNESS/ HOSPITAL COURSE:   Deven Hernandes is a 68 year old male with a past medical history of htn, hld, gerd, prediabetes s who was admitted on 4/21/24 for pneumothorax.      Left Sided Pneumothorax: Resolving   Syncope  URI  Left 5th and 6th Rib Fractures  Patient presented to the hospital with chest pain after a syncopal episode with coughing at home. He has pleuritic chest pain on the left with tenderness to palpation consistent with cxr findings to two rib fractures. Additionally he was found to have a small left apical pneumothorax.                   - CXR on 4/22/24: Trace residual left apical pneumothorax improved from previous               - Pain control     - Discharged with PRN Tylenol, Ibuprofen and Oxycodone                -Thoracic Surgery consulted     - Incentive spirometry  - Ambulation  - Do not fly next week on vacation, but rather wait two weeks at least to decrease risk of pneumothorax  - No strenuous activity for at least 8 weeks     - Work note provided  - CM helped set up PCP visit for 4/30/24     Normocytic anemia  GERD  New finding since 4/15. He denies any bleeding, hematochezia or melena.  Had iron deficeincy anemia in 2018 and underwent egd which found esophagitis. He underwent a colonoscopy in 2023 which was significant for colonic polyps and diverticulosis.                - Hgb: 12.2 > 12.5                - Iron Studies show low iron otherwise WNL                - Folate: WNL                - B12: Pending                   -  "Continue home PPI     Hypokalemia: Resolved  - Replace per protocol     Rash: Resolved   Reportedly had hives over his chest when EMS arrived. 50mg of iv benadryl was administered. No significant rash present on arrival to the ed.      Chronic medical conditions:   HTN                    - BP's have been okay here               - Restart home Amlodipine   - Hold home Losartan in the setting of okay BP's while admitted and Ibuporfen use     - Talk to PCP about restarting      HLD  Prediabetes  BPH   - Continue home Flomax       Clinically Significant Risk Factors Present on Admission        # Hypokalemia: Lowest K = 3 mmol/L in last 2 days, will replace as needed     # Hypomagnesemia: Lowest Mg = 1.6 mg/dL in last 2 days, will replace as needed       # Hypertension: Noted on problem list      # Overweight: Estimated body mass index is 26.93 kg/m  as calculated from the following:    Height as of this encounter: 1.6 m (5' 3\").    Weight as of this encounter: 68.9 kg (152 lb).                   TOTAL DISCHARGE TIME:  INahomi MD, personally saw the patient today and spent greater than 30 minutes discharging this patient.    Nahomi Mojica MD  [facility]  Hospitalist Service   Securely message with the Vocera Web Console (learn more here)  Text page via [email or web address] Paging/Directory        Significant Results and Procedures   N/a    Pending Results   These results will be followed up by n/a  Unresulted Labs Ordered in the Past 30 Days of this Admission       No orders found from 3/22/2024 to 4/22/2024.            Code Status   Full Code       Primary Care Physician   Garrett Clifford    Physical Exam   Temp: 97.6  F (36.4  C) Temp src: Oral BP: 115/77 Pulse: 93   Resp: 17 SpO2: 93 % O2 Device: None (Room air)    Vitals:    04/21/24 2135   Weight: 68.9 kg (152 lb)     Vital Signs with Ranges  Temp:  [97.2  F (36.2  C)-98.1  F (36.7  C)] 97.6  F (36.4  C)  Pulse:  [83-95] 93  Resp:  " [14-17] 17  BP: (108-131)/(72-85) 115/77  SpO2:  [92 %-95 %] 93 %  I/O last 3 completed shifts:  In: 240 [P.O.:240]  Out: -     Constitutional: Awake, alert, cooperative, no apparent distress.    HEENT: PERRL, Normocephalic, without obvious abnormality, atraumatic, oral pharynx with moist mucus membranes  Pulmonary: No increased work of breathing, good air exchange, clear to auscultation bilaterally, no crackles or wheezing.  Cardiovascular: Regular rate and rhythm, normal S1 and S2  Chest Wall: Tenderness over left upper ribs   GI: Normal bowel sounds, soft, non-distended, non-tender.  Skin/Integumen: Visualized skin appeared clear.  Neuro: CN II-XII grossly intact.  Psych:  Alert and oriented x 3. Normal affect.  Extremities: No lower extremity edema noted    Discharge Disposition   Discharged to home  Condition at discharge: Stable    Consultations This Hospital Stay   THORACIC SURGERY IP CONSULT  THORACIC SURGERY IP CONSULT      Discharge Orders      Primary Care Referral      Reason for your hospital stay    You were admitted to the hospital for left sided rib fractures and a left sided pneumothorax     Follow-up and recommended labs and tests     Follow up with primary care provider, Garrett Clifford, within 7-10 days for hospital follow- up.  No follow up labs or test are needed.     Activity    Your activity upon discharge: activity as tolerated    Please do not fly for the next two weeks     No strenuous activity for at least 8 weeks     Discharge Instructions    Can take Tylenol 1,000mg every 8 hours as needed for pain (max 3 times a day)    Can take Ibuprofen 600mg every 6 hours as needed for pain     Can take Oxycodone 5mg every 8 hours as needed for pain    Please do not take your Losartan until you follow up with your PCP.     Diet    Follow this diet upon discharge: Orders Placed This Encounter      Regular Diet Adult     Discharge Medications   Current Discharge Medication List        START taking  these medications    Details   ibuprofen (ADVIL/MOTRIN) 600 MG tablet Take 1 tablet (600 mg) by mouth every 6 hours as needed for moderate pain  Qty: 40 tablet, Refills: 0    Associated Diagnoses: Closed fracture of multiple ribs of left side, initial encounter; Traumatic pneumothorax, initial encounter      oxyCODONE (ROXICODONE) 5 MG tablet Take 1 tablet (5 mg) by mouth every 8 hours as needed for moderate pain  Qty: 12 tablet, Refills: 0    Associated Diagnoses: Closed fracture of multiple ribs of left side, initial encounter; Traumatic pneumothorax, initial encounter           CONTINUE these medications which have CHANGED    Details   losartan (COZAAR) 100 MG tablet Take 1 tablet (100 mg) by mouth daily (Do not take until you follow up with your PCP)    Associated Diagnoses: Essential hypertension, benign           CONTINUE these medications which have NOT CHANGED    Details   acetaminophen (TYLENOL) 500 MG tablet Take 500-1,000 mg by mouth every 6 hours as needed for mild pain      amLODIPine (NORVASC) 10 MG tablet TAKE ONE TABLET BY MOUTH ONE TIME DAILY  Qty: 90 tablet, Refills: 0    Associated Diagnoses: Essential hypertension, benign      atorvastatin (LIPITOR) 10 MG tablet Take 1 tablet (10 mg) by mouth daily  Qty: 90 tablet, Refills: 0    Associated Diagnoses: Hyperlipidemia LDL goal <130      clotrimazole-betamethasone (LOTRISONE) 1-0.05 % external cream Apply topically 2 times daily  Qty: 45 g, Refills: 1    Associated Diagnoses: Rash and nonspecific skin eruption      MULTIVITAMIN CHEW   OR Take 1 tablet by mouth daily      omeprazole (PRILOSEC OTC) 20 MG EC tablet Take 1 tablet (20 mg) by mouth daily  Qty: 90 tablet, Refills: 3    Associated Diagnoses: Gastroesophageal reflux disease without esophagitis      sildenafil (VIAGRA) 100 MG tablet Take 1 tablet (100 mg) by mouth daily as needed (sexual activity)  Qty: 30 tablet, Refills: 10    Associated Diagnoses: Erectile dysfunction due to arterial  insufficiency      tamsulosin (FLOMAX) 0.4 MG capsule Take 1 capsule (0.4 mg) by mouth daily  Qty: 90 capsule, Refills: 3    Associated Diagnoses: Elevated prostate specific antigen (PSA); BPH with obstruction/lower urinary tract symptoms           Allergies   Allergies   Allergen Reactions    Amoxicillin Itching and Swelling    Lansoprazole Diarrhea    Lisinopril Cough    Morphine And Related      gi    Niacin Other (See Comments)     heartburn    Penicillins     Verapamil Other (See Comments)     Constipation       Data   Most Recent 3 CBC's:  Recent Labs   Lab Test 04/22/24  0644 04/21/24  2146 04/15/24  0744   WBC 12.3* 10.0 6.0   HGB 12.5* 12.2* 14.7   MCV 95 95 95    297 265      Most Recent 3 BMP's:  Recent Labs   Lab Test 04/22/24  2323 04/22/24  1758 04/22/24  0644 04/21/24  2146 04/15/24  0744   NA  --   --  136 135 135   POTASSIUM 3.8 3.3* 3.4  3.4 3.0* 3.5   CHLORIDE  --   --  103 100 102   CO2  --   --  20* 19* 20*   BUN  --   --  7.7* 10.5 13.0   CR  --   --  0.73 0.95 1.00   ANIONGAP  --   --  13 16* 13   KERRY  --   --  8.6* 8.7* 9.7   GLC  --   --  103* 154* 131*     Most Recent 2 LFT's:  Recent Labs   Lab Test 04/21/24  2146 04/15/24  0744   AST 19 18   ALT 18 17   ALKPHOS 92 85   BILITOTAL 0.3 0.8     Most Recent INR's and Anticoagulation Dosing History:  Anticoagulation Dose History           No data to display              Most Recent 3 Troponin's:No lab results found.  Most Recent Cholesterol Panel:  Recent Labs   Lab Test 04/15/24  0744   CHOL 136   LDL 69   HDL 35*   TRIG 158*     Most Recent 6 Bacteria Isolates From Any Culture (See EPIC Reports for Culture Details):  Recent Labs   Lab Test 12/03/19  1353   CULT Rhodotorula species  isolated  *  No additional fungi cultured after 4 weeks incubation     Most Recent TSH, T4 and A1c Labs:  Recent Labs   Lab Test 02/06/23  0739   A1C 5.9*     Results for orders placed or performed during the hospital encounter of 04/21/24   Chest XR,  PA  & LAT    Narrative    EXAM: XR CHEST 2 VIEWS  LOCATION: [facility]  DATE: 4/21/2024    INDICATION: Chest pain  COMPARISON: 11/05/2022      Impression    IMPRESSION: Ill-defined streaky/tiny patchy opacities in the left lower lung, possibly atelectasis but a mild infectious process cannot be excluded. A small left apical pneumothorax is present. Mildly displaced acute fractures involving at least the left   lateral fifth and sixth ribs. No pleural effusion. No right pneumothorax. Heart size and pulmonary vascularity are normal. No mediastinal shift.    Finding of small left apical pneumothorax and left rib fractures without mediastinal shift relayed to Dr. Joshi at 11:00 PM on 04/21/2024.   XR Chest Port 1 View    Narrative    CHEST ONE VIEW  April 22, 2024 8:53 AM     HISTORY: Follow up pneumothorax.    COMPARISON: April 21, 2024.      Impression    IMPRESSION: Trace residual left apical pneumothorax improved from  previous. Right lung clear.    AMBIKA PEREIRA MD         SYSTEM ID:  [id number]

## 2024-04-23 NOTE — PLAN OF CARE
Goal Outcome Evaluation:      Plan of Care Reviewed With: patient    Overall Patient Progress: improvingOverall Patient Progress: improving      PRIMARY Concern: Chest pain/rib pain and syncope  SAFETY RISK Concerns (fall risk, behaviors, etc.): Fall risk       Isolation/Type: NA  Tests/Procedures for NEXT shift: NA  Consults? (Pending/following, signed-off?) Thoracic surgery signed off  Where is patient from? (Home, TCU, etc.): Home  Other Important info for NEXT shift: IS 1500  Anticipated DC date & active delays: 1-2 days pending pain control   _____________________________________________________________________________  SUMMARY NOTE:  Orientation/Cognitive: A&Ox4  Observation Goals (Met/ Not Met): Partially met  Mobility Level/Assist Equipment: SBA  Antibiotics & Plan (IV/po, length of tx left): NA  Pain Management: PRN oxy given x2, on scheduled tylenol and ibuprofen   Tele/VS/O2: Tele:SR, VSS  ABNL Lab/BG: K:3.7 WBC:12.3 Hgb:12.5  Diet: Regular  Bowel/Bladder: Continent   Skin Concerns: WDL  Drains/Devices: PIV:S/L  Patient Stated Goal for Today: To go home and pain control   Observation goals  PRIOR TO DISCHARGE        Comments: -diagnostic tests and consults completed and resulted:met  -vital signs normal or at patient baseline:Met  Nurse to notify provider when observation goals have been met and patient is ready for discharge

## 2024-04-23 NOTE — PLAN OF CARE
PRIMARY Concern: Chest pain/rib pain and syncope  SAFETY RISK Concerns (fall risk, behaviors, etc.): Fall risk       Isolation/Type: NA  Tests/Procedures for NEXT shift: NA  Consults? (Pending/following, signed-off?) Thoracic surgery signed off  Where is patient from? (Home, TCU, etc.): Home  Other Important info for NEXT shift: IS 1500  Anticipated DC date & active delays: 1-2 days pending pain control   _____________________________________________________________________________  SUMMARY NOTE:  Orientation/Cognitive: A&Ox4  Observation Goals (Met/ Not Met): Partially met  Mobility Level/Assist Equipment: SBA  Antibiotics & Plan (IV/po, length of tx left): NA  Pain Management: PRN oxy given x2, on scheduled tylenol and ibuprofen   Tele/VS/O2: Tele:SR, VSS  ABNL Lab/BG: K:3.8 WBC:12.3 Hgb:12.5  Diet: Regular  Bowel/Bladder: Continent   Skin Concerns: WDL  Drains/Devices: PIV:S/L  Patient Stated Goal for Today: To go home and pain control       VSS RA, no complaints of chest pain or SOB. PIV removed. AVS was reviewed with the patient, with all questions and concerns answered. All belongings were taken with the patient. Patient was transported down to door 6 via transport aide to be picked up by wife.

## 2024-04-24 ENCOUNTER — PATIENT OUTREACH (OUTPATIENT)
Dept: CARE COORDINATION | Facility: CLINIC | Age: 69
End: 2024-04-24
Payer: COMMERCIAL

## 2024-04-24 NOTE — PROGRESS NOTES
[facility]: [facility]: Post-Discharge Note  SITUATION                                                      Admission:    Admission Date: 04/21/24   Reason for Admission: You were admitted to the hospital for left sided rib fractures  and a left sided pneumothorax  Discharge:   Discharge Date: 04/23/24  Discharge Diagnosis: You were admitted to the hospital for left sided rib fractures  and a left sided pneumothorax    BACKGROUND                                                      Per hospital discharge summary and inpatient provider notes:    Deven Hernandes is a 68 year old male with a past medical history of htn, gerd, hld, and prediabetes presents to the hospital with chest pain after a syncopal episode. The patient reports that he has had a URI for the last two weeks with frequent coughing spellings. His cough is dry and does not improve with over the counter antitussives. He had a coughing spelling on 4/21 which was severe resulting in him passive out. He woke up on the ground with left sided chest pain. He called ems and was brought to the hospital. En route to the hospital EMS administered aspiring 325mg, and nitroglycerin. Additionally they noted that he had hives on his back and chest and administered 50mg iv benadryl. After the nitroglycerin the patient became hypotensive with sbp in the 80s, 500ml NS was administered with improved of his symptoms. By the time he arrived in the er the er provider could not find any hives. The patient reports that his chest pain is pleuritic and located on the left side. It is worse with movement. Work up in the er was significant for two rib fractures, and a small left apical pneumothorax. During my interview the patient reports that his pain is well controlled. He has no other injuries. Reports pruritus over his legs, but his hives and rash have resolved.   Work up in the er was also significant for anemia which appears to be new from labs  drawn earlier this month. The patient denies any bleeding, melena, or blood in his stool. He reports a prior history of iron deficiency anemia in 2018 thought to be due to upper gi blood loss. He underwent an egd at that time which was significant for esophagitis. He is currently on a pta PPI. He underwent a colonoscopy in 2023 which was significant for polyps and diverticulosis.     ASSESSMENT           Discharge Assessment  How are you doing now that you are home?: I am doing better.  How are your symptoms? (Red Flag symptoms escalate to triage hotline per guidelines): Improved  Do you feel your condition is stable enough to be safe at home until your provider visit?: Yes  Does the patient have their discharge instructions? : Yes  Does the patient have questions regarding their discharge instructions? : No  Were you started on any new medications or were there changes to any of your previous medications? : Yes  Does the patient have all of their medications?: Yes  Do you have questions regarding any of your medications? : No  Discharge follow-up appointment scheduled within 14 calendar days? : Yes  Discharge Follow Up Appointment Date: 04/30/24  Discharge Follow Up Appointment Scheduled with?: Primary Care Provider                  PLAN                                                      Outpatient Plan: Follow up with primary care provider, Garrett Clifford, within 7-10 days  for hospital follow- up. No follow up labs or test are needed.    Future Appointments   Date Time Provider Department Center   4/25/2024  2:00 PM Phu Conteh MD [facility]   4/30/2024  3:00 PM Garrett Clifford MD [facility]         For any urgent concerns, please contact our 24 hour nurse triage line: [phone] ([phone])         J CARLOS Samuels  [phone]  [facility]

## 2024-04-30 ENCOUNTER — OFFICE VISIT (OUTPATIENT)
Dept: INTERNAL MEDICINE | Facility: CLINIC | Age: 69
End: 2024-04-30
Payer: COMMERCIAL

## 2024-04-30 VITALS
SYSTOLIC BLOOD PRESSURE: 112 MMHG | BODY MASS INDEX: 27.14 KG/M2 | DIASTOLIC BLOOD PRESSURE: 64 MMHG | OXYGEN SATURATION: 97 % | HEART RATE: 90 BPM | RESPIRATION RATE: 17 BRPM | WEIGHT: 153.2 LBS | HEIGHT: 63 IN | TEMPERATURE: 97.9 F

## 2024-04-30 DIAGNOSIS — R21 RASH AND NONSPECIFIC SKIN ERUPTION: ICD-10-CM

## 2024-04-30 DIAGNOSIS — Z09 HOSPITAL DISCHARGE FOLLOW-UP: Primary | ICD-10-CM

## 2024-04-30 DIAGNOSIS — S27.0XXA TRAUMATIC PNEUMOTHORAX, INITIAL ENCOUNTER: ICD-10-CM

## 2024-04-30 DIAGNOSIS — I10 ESSENTIAL HYPERTENSION, BENIGN: ICD-10-CM

## 2024-04-30 DIAGNOSIS — S22.42XA CLOSED FRACTURE OF MULTIPLE RIBS OF LEFT SIDE, INITIAL ENCOUNTER: ICD-10-CM

## 2024-04-30 PROCEDURE — 99495 TRANSJ CARE MGMT MOD F2F 14D: CPT | Performed by: INTERNAL MEDICINE

## 2024-04-30 RX ORDER — CLOTRIMAZOLE AND BETAMETHASONE DIPROPIONATE 10; .64 MG/G; MG/G
CREAM TOPICAL 2 TIMES DAILY
Qty: 45 G | Refills: 1 | Status: SHIPPED | OUTPATIENT
Start: 2024-04-30

## 2024-04-30 ASSESSMENT — PAIN SCALES - GENERAL: PAINLEVEL: MILD PAIN (3)

## 2024-04-30 NOTE — PROGRESS NOTES
Assessment & Plan     Hospital discharge follow-up  Stable and doing well post discharge.  Suggest rechecking hemoglobin as ordered  - CBC with platelets; Future    Closed fracture of multiple ribs of left side, initial encounter  Proving with pain control.  Now off narcotic.  Continue with supportive care with lifting restrictions.  - CBC with platelets; Future    Traumatic pneumothorax, initial encounter  Normal O2 sats, no shortness of breath.  Advised patient to avoid flying in an airplane for at least 2 weeks but preferably 4 weeks.  Follow-up chest x-ray   - XR Chest 2 Views; Future    Essential hypertension, benign  Stable on therapy.  Patient has now held losartan which has been discontinued from his medication list.  Follow-up and observe blood pressure accordingly    Rash and nonspecific skin eruption  Refilled per patient request.  - clotrimazole-betamethasone (LOTRISONE) 1-0.05 % external cream; Apply topically 2 times daily      The longitudinal plan of care for the diagnosis(es)/condition(s) as documented were addressed during this visit. Due to the added complexity in care, I will continue to support Morgan in the subsequent management and with ongoing continuity of care.     MED REC REQUIRED  Post Medication Reconciliation Status: discharge medications reconciled, continue medications without change    See Patient Instructions    Subjective   Morgan is a 68 year old, presenting for the following health issues:  Hospital F/U    HPI     Patient states he feels well and offers no major complaints.  He is improving although still having some chest wall pain.  His breathing is good with no distinct shortness of breath.  He denies pleuritic chest pain.  He is weaned off his pain medication.    He also has stopped his losartan at the recommendation of his discharge orders.  His blood pressure has been stable at home.        4/24/2024    12:32 PM   Post Discharge Outreach   Admission Date 4/21/2024   Reason  for Admission You were admitted to the hospital for left sided rib fractures  and a left sided pneumothorax   Discharge Date 4/23/2024   Discharge Diagnosis You were admitted to the hospital for left sided rib fractures  and a left sided pneumothorax   How are you doing now that you are home? I am doing better.   How are your symptoms? (Red Flag symptoms escalate to triage hotline per guidelines) Improved   Do you feel your condition is stable enough to be safe at home until your provider visit? Yes   Does the patient have their discharge instructions?  Yes   Does the patient have questions regarding their discharge instructions?  No   Were you started on any new medications or were there changes to any of your previous medications?  Yes   Does the patient have all of their medications? Yes   Do you have questions regarding any of your medications?  No   Discharge follow-up appointment scheduled within 14 calendar days?  Yes   Discharge Follow Up Appointment Date 4/30/2024   Discharge Follow Up Appointment Scheduled with? Primary Care Provider     Hospital Follow-up Visit:    Hospital/Nursing Home/ Rehab Facility: [facility]  Date of Admission: 4/21/24  Date of Discharge: 4/23/24  Reason(s) for Admission: Closed fracture of multiple ribs of left side, traumatic pneumothorax   Was the patient in the ICU or did the patient experience delirium during hospitalization?  No  Do you have any other stressors you would like to discuss with your provider? No    Problems taking medications regularly:  None  Medication changes since discharge: None  Problems adhering to non-medication therapy:  None    Summary of hospitalization:  [facility] discharge summary reviewed  Diagnostic Tests/Treatments reviewed.  Follow up needed: none  Other Healthcare Providers Involved in Patient s Care:         None  Update since discharge: improved.     Plan of care communicated with patient       Left  Sided Pneumothorax: Resolving   Syncope  URI  Left 5th and 6th Rib Fractures  Patient presented to the hospital with chest pain after a syncopal episode with coughing at home. He has pleuritic chest pain on the left with tenderness to palpation consistent with cxr findings to two rib fractures. Additionally he was found to have a small left apical pneumothorax.                   - CXR on 4/22/24: Trace residual left apical pneumothorax improved from previous               - Pain control                           - Discharged with PRN Tylenol, Ibuprofen and Oxycodone                -Thoracic Surgery consulted                           - Incentive spirometry  - Ambulation  - Do not fly next week on vacation, but rather wait two weeks at least to decrease risk of pneumothorax  - No strenuous activity for at least 8 weeks      - Work note provided  - CM helped set up PCP visit for 4/30/24     Current Outpatient Medications   Medication Sig Dispense Refill    acetaminophen (TYLENOL) 500 MG tablet Take 500-1,000 mg by mouth every 6 hours as needed for mild pain      amLODIPine (NORVASC) 10 MG tablet TAKE ONE TABLET BY MOUTH ONE TIME DAILY 90 tablet 0    atorvastatin (LIPITOR) 10 MG tablet Take 1 tablet (10 mg) by mouth daily 90 tablet 0    clotrimazole-betamethasone (LOTRISONE) 1-0.05 % external cream Apply topically 2 times daily 45 g 1    ibuprofen (ADVIL/MOTRIN) 600 MG tablet Take 1 tablet (600 mg) by mouth every 6 hours as needed for moderate pain 40 tablet 0    MULTIVITAMIN CHEW   OR Take 1 tablet by mouth daily      omeprazole (PRILOSEC OTC) 20 MG EC tablet Take 1 tablet (20 mg) by mouth daily 90 tablet 3    sildenafil (VIAGRA) 100 MG tablet Take 1 tablet (100 mg) by mouth daily as needed (sexual activity) 30 tablet 10    tamsulosin (FLOMAX) 0.4 MG capsule Take 1 capsule (0.4 mg) by mouth daily 90 capsule 3     No current facility-administered medications for this visit.          Normocytic anemia  GERD  New finding  "since 4/15. He denies any bleeding, hematochezia or melena.  Had iron deficeincy anemia in 2018 and underwent egd which found esophagitis. He underwent a colonoscopy in 2023 which was significant for colonic polyps and diverticulosis.                - Hgb: 12.2 > 12.5                - Iron Studies show low iron otherwise WNL                - Folate: WNL                - B12: Pending                   - Continue home PPI     Hypokalemia: Resolved  - Replace per protocol     Rash: Resolved   Reportedly had hives over his chest when EMS arrived. 50mg of iv benadryl was administered. No significant rash present on arrival to the ed.      Chronic medical conditions:   HTN                    - BP's have been okay here               - Restart home Amlodipine              - Hold home Losartan in the setting of okay BP's while admitted and Ibuporfen use                           - Talk to PCP about restarting       Review of Systems  CONSTITUTIONAL: NEGATIVE for fever, chills, change in weight  EYES: NEGATIVE for vision changes or irritation  ENT/MOUTH: NEGATIVE for ear, mouth and throat problems  RESP: NEGATIVE for significant cough or SOB  GI: NEGATIVE for nausea, abdominal pain, heartburn, or change in bowel habits  : NEGATIVE for frequency, dysuria, or hematuria  MUSCULOSKELETAL: NEGATIVE for significant arthralgias or myalgia  NEURO: NEGATIVE for weakness, dizziness or paresthesias  ENDOCRINE: NEGATIVE for temperature intolerance, skin/hair changes  HEME: NEGATIVE for bleeding problems  PSYCHIATRIC: NEGATIVE for changes in mood or affect      Objective    /64   Pulse 90   Temp 97.9  F (36.6  C) (Temporal)   Resp 17   Ht 1.6 m (5' 3\")   Wt 69.5 kg (153 lb 3.2 oz)   SpO2 97%   BMI 27.14 kg/m    Body mass index is 27.14 kg/m .    Physical Exam   GENERAL: alert and no distress  EYES: Eyes grossly normal to inspection, PERRL and conjunctivae and sclerae normal  HENT: ear canals and TM's normal, nose and mouth " without ulcers or lesions  RESP: lungs clear to auscultation - no rales, rhonchi or wheezes  CV: regular rate and rhythm, normal S1 S2, no S3 or S4, no click or rub, no peripheral edema  MS: no gross musculoskeletal defects noted, no edema  NEURO: No focal changes  PSYCH: mentation appears normal, affect normal/bright    Potassium   Date Value Ref Range Status   04/22/2024 3.8 3.4 - 5.3 mmol/L Final   02/11/2022 3.1 (L) 3.4 - 5.3 mmol/L Final   12/23/2020 3.4 3.4 - 5.3 mmol/L Final          Signed Electronically by: Garrett Clifford MD

## 2024-05-23 ENCOUNTER — ANCILLARY PROCEDURE (OUTPATIENT)
Dept: GENERAL RADIOLOGY | Facility: CLINIC | Age: 69
End: 2024-05-23
Attending: INTERNAL MEDICINE
Payer: COMMERCIAL

## 2024-05-23 ENCOUNTER — LAB (OUTPATIENT)
Dept: LAB | Facility: CLINIC | Age: 69
End: 2024-05-23
Payer: COMMERCIAL

## 2024-05-23 DIAGNOSIS — S27.0XXA TRAUMATIC PNEUMOTHORAX, INITIAL ENCOUNTER: ICD-10-CM

## 2024-05-23 DIAGNOSIS — S22.42XA CLOSED FRACTURE OF MULTIPLE RIBS OF LEFT SIDE, INITIAL ENCOUNTER: ICD-10-CM

## 2024-05-23 DIAGNOSIS — Z09 HOSPITAL DISCHARGE FOLLOW-UP: ICD-10-CM

## 2024-05-23 LAB
ERYTHROCYTE [DISTWIDTH] IN BLOOD BY AUTOMATED COUNT: 12.3 % (ref 10–15)
HCT VFR BLD AUTO: 41.3 % (ref 40–53)
HGB BLD-MCNC: 14.2 G/DL (ref 13.3–17.7)
MCH RBC QN AUTO: 32.3 PG (ref 26.5–33)
MCHC RBC AUTO-ENTMCNC: 34.4 G/DL (ref 31.5–36.5)
MCV RBC AUTO: 94 FL (ref 78–100)
PLATELET # BLD AUTO: 273 10E3/UL (ref 150–450)
RBC # BLD AUTO: 4.4 10E6/UL (ref 4.4–5.9)
WBC # BLD AUTO: 6.4 10E3/UL (ref 4–11)

## 2024-05-23 PROCEDURE — 85027 COMPLETE CBC AUTOMATED: CPT

## 2024-05-23 PROCEDURE — 36415 COLL VENOUS BLD VENIPUNCTURE: CPT

## 2024-05-23 PROCEDURE — 71046 X-RAY EXAM CHEST 2 VIEWS: CPT | Mod: TC | Performed by: RADIOLOGY

## 2024-06-20 ENCOUNTER — OFFICE VISIT (OUTPATIENT)
Dept: UROLOGY | Facility: CLINIC | Age: 69
End: 2024-06-20
Payer: COMMERCIAL

## 2024-06-20 VITALS — HEART RATE: 107 BPM | OXYGEN SATURATION: 96 % | DIASTOLIC BLOOD PRESSURE: 77 MMHG | SYSTOLIC BLOOD PRESSURE: 132 MMHG

## 2024-06-20 DIAGNOSIS — R97.20 ELEVATED PROSTATE SPECIFIC ANTIGEN (PSA): Primary | ICD-10-CM

## 2024-06-20 LAB — PSA SERPL-MCNC: 5.6 UG/L (ref 0–4)

## 2024-06-20 PROCEDURE — 99213 OFFICE O/P EST LOW 20 MIN: CPT | Performed by: NURSE PRACTITIONER

## 2024-06-20 PROCEDURE — 36415 COLL VENOUS BLD VENIPUNCTURE: CPT | Performed by: NURSE PRACTITIONER

## 2024-06-20 PROCEDURE — 84153 ASSAY OF PSA TOTAL: CPT | Performed by: NURSE PRACTITIONER

## 2024-06-20 NOTE — PROGRESS NOTES
Urology Outpatient Visit    Name: Deven Hernandes    MRN: [id number]   YOB: 1955               Chief Complaint:   Elevated PSA         Impression and Plan:   Impression / Plan:   Deven Hernandes is a 68 year old male with Hx elevated PSA here for PSA monitoring, PSA relatively stable, 5.6       We discussed various available treatment modalities for ED, including: Rx of PDE5i, vacuum erectile device, penile silicone ring, intracavernosal injections, inflatable penile prosthesis. Plan to continue Viagra and trial LISA.    Follow-up in 1 yr w PSA    Thank you for the opportunity to participate in the care of Deven Hrenandes.     RADHA Zurita, CNP  [facility] - Department of Urology  [phone]          History of Present Illness:   Deven Hernandes is a 68 year old male with Hx elevated PSA here for PSA monitoring and routine follow-up.  He reports he has been taking Viagra which does help, although reports Sx could be better.       PSA 5.60 06/20/2024    PSA 4.80 06/22/2023    PSA 6.80 08/04/2022    PSA 4.98 07/29/2022    PSA 5.90 03/10/2022    PSA 5.55 01/24/2022    PSA 4.05 11/04/2020    PSA 3.72 10/22/2019    PSA 3.45 07/16/2018    PSA 4.08 06/25/2018    PSA 4.61 06/22/2017    PSA 3.70 06/14/2016    PSA 3.68 04/28/2015    PSA 2.68 10/15/2014    PSA 1.45 09/23/2010     History is obtained from patient & EMR          Past Medical History:     Past Medical History:   Diagnosis Date    Esophageal reflux     Essential hypertension, benign     Hyperglycemia     Hyperlipidemia     Mumps     Tobacco use disorder     dced 2003          Past Surgical History:     Past Surgical History:   Procedure Laterality Date    COLONOSCOPY  04/19/2017    DISCECTOMY LUMBAR MINIMALLY INVASIVE ONE LEVEL Right 4/12/2023    Procedure: MINIMALLY INVASIVE RIGHT LUMBAR 3 TO LUMBAR 4 DISCECTOMY USING MICROSCOPE;  Surgeon: Tip Jeffries MD;  Location:  OR    ESOPHAGOSCOPY, GASTROSCOPY, DUODENOSCOPY (EGD),  COMBINED N/A 2018    Procedure: COMBINED ESOPHAGOSCOPY, GASTROSCOPY, DUODENOSCOPY (EGD), BIOPSY SINGLE OR MULTIPLE;  gastroscopy;  Surgeon: Stephen Skelton MD;  Location:  GI    TONSILLECTOMY      as a child    ZZ NONSPECIFIC PROCEDURE      tonsillectomy    [facility] NONSPECIFIC PROCEDURE      nl colonoscopy          Social History:     Social History     Tobacco Use    Smoking status: Former     Current packs/day: 0.00     Average packs/day: 0.3 packs/day for 20.0 years (5.0 ttl pk-yrs)     Types: Cigarettes     Start date: 1983     Quit date: 2003     Years since quittin.6    Smokeless tobacco: Never   Substance Use Topics    Alcohol use: Yes     Comment: socially          Family History:     Family History   Problem Relation Age of Onset    Respiratory Mother         b:1926   emphysema, ?heart problems, HTN    Hypertension Mother     Cerebrovascular Disease Mother     Cancer Father         d:age 82   colon cancer    Colon Cancer Father     Family History Negative Brother         b:1948    Diabetes Brother     Hypertension Brother     Arthritis Sister         b:   unsure of type    Diabetes Cousin           Allergies:     Allergies   Allergen Reactions    Amoxicillin Itching and Swelling    Lansoprazole Diarrhea    Lisinopril Cough    Morphine And Codeine      gi    Niacin Other (See Comments)     heartburn    Penicillins     Verapamil Other (See Comments)     Constipation            Medications:     Current Outpatient Medications   Medication Sig Dispense Refill    acetaminophen (TYLENOL) 500 MG tablet Take 500-1,000 mg by mouth every 6 hours as needed for mild pain      amLODIPine (NORVASC) 10 MG tablet TAKE ONE TABLET BY MOUTH ONE TIME DAILY 90 tablet 0    atorvastatin (LIPITOR) 10 MG tablet Take 1 tablet (10 mg) by mouth daily 90 tablet 0    clotrimazole-betamethasone (LOTRISONE) 1-0.05 % external cream Apply topically 2 times daily 45 g 1    ibuprofen (ADVIL/MOTRIN) 600 MG tablet  Take 1 tablet (600 mg) by mouth every 6 hours as needed for moderate pain 40 tablet 0    MULTIVITAMIN CHEW   OR Take 1 tablet by mouth daily      omeprazole (PRILOSEC OTC) 20 MG EC tablet Take 1 tablet (20 mg) by mouth daily 90 tablet 3    sildenafil (VIAGRA) 100 MG tablet Take 1 tablet (100 mg) by mouth daily as needed (sexual activity) 30 tablet 10    tamsulosin (FLOMAX) 0.4 MG capsule Take 1 capsule (0.4 mg) by mouth daily 90 capsule 3     No current facility-administered medications for this visit.          Review of Systems:    ROS: See HPI for pertinent details.  Remainder of 10-point ROS negative.         Physical Exam:   VS:  T: Data Unavailable    HR: 107    BP: 132/77    RR: Data Unavailable     GEN:  Alert.  NAD.   HEENT:  Sclerae anicteric.    CV:  No obvious jugular venous distension.  LUNGS: No respiratory distress, breathing comfortably wo accessory muscle use.  ABD:  ND.     SKIN:  Dry. No visible rashes.   NEURO:  CN grossly intact.          Laboratory Data:     Lab Results   Component Value Date    PSA 5.60 06/20/2024    PSA 4.80 06/22/2023    PSA 6.80 08/04/2022    PSA 4.98 07/29/2022    PSA 5.90 03/10/2022    PSA 5.55 01/24/2022    PSA 4.05 11/04/2020    PSA 3.72 10/22/2019    PSA 3.45 07/16/2018    PSA 4.08 06/25/2018    PSA 4.61 06/22/2017    PSA 3.70 06/14/2016    PSA 3.68 04/28/2015    PSA 2.68 10/15/2014    PSA 1.45 09/23/2010     Lab Results   Component Value Date    CR 0.73 04/22/2024    CR 0.95 04/21/2024    CR 1.00 04/15/2024    CR 0.87 04/13/2023    CR 0.84 02/06/2023    CR 0.81 01/24/2022    CR 0.78 12/23/2020    CR 0.97 11/23/2020    CR 0.83 11/04/2020    CR 1.00 10/22/2019    CR 0.87 07/09/2018    CR 0.89 06/25/2018    CR 0.99 06/22/2017    CR 0.77 06/14/2016    CR 0.90 04/28/2015    CR 0.88 10/15/2014     Lab Results   Component Value Date    GFRESTIMATED >90 04/22/2024    GFRESTIMATED 87 04/21/2024    GFRESTIMATED 81 04/15/2024    GFRESTIMATED >90 04/13/2023    GFRESTIMATED >90  02/06/2023    GFRESTIMATED >90 01/24/2022    GFRESTIMATED >90 12/23/2020    GFRESTIMATED 81 11/23/2020    GFRESTIMATED >90 11/04/2020    GFRESTIMATED 79 10/22/2019    GFRESTIMATED 88 07/09/2018    GFRESTIMATED 86 06/25/2018    GFRESTIMATED 76 06/22/2017    GFRESTIMATED >90  Non  GFR Calc   06/14/2016    GFRESTIMATED 86 04/28/2015    GFRESTIMATED 88 10/15/2014

## 2024-06-20 NOTE — LETTER
6/20/2024       RE: Deven Hernandes  [address]  [place] [address]     Dear Colleague,    Thank you for referring your patient, Deven Hernandes, to the [facility] at [facility]. Please see a copy of my visit note below.      Urology Outpatient Visit    Name: Deven Hernandes    MRN: [id number]   YOB: 1955               Chief Complaint:   Elevated PSA         Impression and Plan:   Impression / Plan:   Deven Hernandes is a 68 year old male with Hx elevated PSA here for PSA monitoring, PSA relatively stable, 5.6       We discussed various available treatment modalities for ED, including: Rx of PDE5i, vacuum erectile device, penile silicone ring, intracavernosal injections, inflatable penile prosthesis. Plan to continue Viagra and trial LISA.    Follow-up in 1 yr w PSA    Thank you for the opportunity to participate in the care of Deven Hernandes.     RADHA Zurita, CNP   [facility] - Department of [facility]rology  [phone]          History of Present Illness:   Deven Hernandes is a 68 year old male with Hx elevated PSA here for PSA monitoring and routine follow-up.  He reports he has been taking Viagra which does help, although reports Sx could be better.       PSA 5.60 06/20/2024    PSA 4.80 06/22/2023    PSA 6.80 08/04/2022    PSA 4.98 07/29/2022    PSA 5.90 03/10/2022    PSA 5.55 01/24/2022    PSA 4.05 11/04/2020    PSA 3.72 10/22/2019    PSA 3.45 07/16/2018    PSA 4.08 06/25/2018    PSA 4.61 06/22/2017    PSA 3.70 06/14/2016    PSA 3.68 04/28/2015    PSA 2.68 10/15/2014    PSA 1.45 09/23/2010     History is obtained from patient & EMR          Past Medical History:     Past Medical History:   Diagnosis Date    Esophageal reflux     Essential hypertension, benign     Hyperglycemia     Hyperlipidemia     Mumps     Tobacco use disorder     dced 2003          Past Surgical History:     Past Surgical History:    Procedure Laterality Date    COLONOSCOPY  2017    DISCECTOMY LUMBAR MINIMALLY INVASIVE ONE LEVEL Right 2023    Procedure: MINIMALLY INVASIVE RIGHT LUMBAR 3 TO LUMBAR 4 DISCECTOMY USING MICROSCOPE;  Surgeon: Tip Jeffries MD;  Location:  OR    ESOPHAGOSCOPY, GASTROSCOPY, DUODENOSCOPY (EGD), COMBINED N/A 2018    Procedure: COMBINED ESOPHAGOSCOPY, GASTROSCOPY, DUODENOSCOPY (EGD), BIOPSY SINGLE OR MULTIPLE;  gastroscopy;  Surgeon: Stephen Skelton MD;  Location:  GI    TONSILLECTOMY      as a child    [facility] NONSPECIFIC PROCEDURE      tonsillectomy    [facility] NONSPECIFIC PROCEDURE      nl colonoscopy          Social History:     Social History     Tobacco Use    Smoking status: Former     Current packs/day: 0.00     Average packs/day: 0.3 packs/day for 20.0 years (5.0 ttl pk-yrs)     Types: Cigarettes     Start date: 1983     Quit date: 2003     Years since quittin.6    Smokeless tobacco: Never   Substance Use Topics    Alcohol use: Yes     Comment: socially          Family History:     Family History   Problem Relation Age of Onset    Respiratory Mother         b:1926   emphysema, ?heart problems, HTN    Hypertension Mother     Cerebrovascular Disease Mother     Cancer Father         d:age 82   colon cancer    Colon Cancer Father     Family History Negative Brother         b:1948    Diabetes Brother     Hypertension Brother     Arthritis Sister         b:1950   unsure of type    Diabetes Cousin           Allergies:     Allergies   Allergen Reactions    Amoxicillin Itching and Swelling    Lansoprazole Diarrhea    Lisinopril Cough    Morphine And Codeine      gi    Niacin Other (See Comments)     heartburn    Penicillins     Verapamil Other (See Comments)     Constipation            Medications:     Current Outpatient Medications   Medication Sig Dispense Refill    acetaminophen (TYLENOL) 500 MG tablet Take 500-1,000 mg by mouth every 6 hours as needed for mild pain      amLODIPine  (NORVASC) 10 MG tablet TAKE ONE TABLET BY MOUTH ONE TIME DAILY 90 tablet 0    atorvastatin (LIPITOR) 10 MG tablet Take 1 tablet (10 mg) by mouth daily 90 tablet 0    clotrimazole-betamethasone (LOTRISONE) 1-0.05 % external cream Apply topically 2 times daily 45 g 1    ibuprofen (ADVIL/MOTRIN) 600 MG tablet Take 1 tablet (600 mg) by mouth every 6 hours as needed for moderate pain 40 tablet 0    MULTIVITAMIN CHEW   OR Take 1 tablet by mouth daily      omeprazole (PRILOSEC OTC) 20 MG EC tablet Take 1 tablet (20 mg) by mouth daily 90 tablet 3    sildenafil (VIAGRA) 100 MG tablet Take 1 tablet (100 mg) by mouth daily as needed (sexual activity) 30 tablet 10    tamsulosin (FLOMAX) 0.4 MG capsule Take 1 capsule (0.4 mg) by mouth daily 90 capsule 3     No current facility-administered medications for this visit.          Review of Systems:    ROS: See HPI for pertinent details.  Remainder of 10-point ROS negative.         Physical Exam:   VS:  T: Data Unavailable    HR: 107    BP: 132/77    RR: Data Unavailable     GEN:  Alert.  NAD.   HEENT:  Sclerae anicteric.    CV:  No obvious jugular venous distension.  LUNGS: No respiratory distress, breathing comfortably wo accessory muscle use.  ABD:  ND.     SKIN:  Dry. No visible rashes.   NEURO:  CN grossly intact.          Laboratory Data:     Lab Results   Component Value Date    PSA 5.60 06/20/2024    PSA 4.80 06/22/2023    PSA 6.80 08/04/2022    PSA 4.98 07/29/2022    PSA 5.90 03/10/2022    PSA 5.55 01/24/2022    PSA 4.05 11/04/2020    PSA 3.72 10/22/2019    PSA 3.45 07/16/2018    PSA 4.08 06/25/2018    PSA 4.61 06/22/2017    PSA 3.70 06/14/2016    PSA 3.68 04/28/2015    PSA 2.68 10/15/2014    PSA 1.45 09/23/2010     Lab Results   Component Value Date    CR 0.73 04/22/2024    CR 0.95 04/21/2024    CR 1.00 04/15/2024    CR 0.87 04/13/2023    CR 0.84 02/06/2023    CR 0.81 01/24/2022    CR 0.78 12/23/2020    CR 0.97 11/23/2020    CR 0.83 11/04/2020    CR 1.00 10/22/2019    CR  0.87 07/09/2018    CR 0.89 06/25/2018    CR 0.99 06/22/2017    CR 0.77 06/14/2016    CR 0.90 04/28/2015    CR 0.88 10/15/2014     Lab Results   Component Value Date    GFRESTIMATED >90 04/22/2024    GFRESTIMATED 87 04/21/2024    GFRESTIMATED 81 04/15/2024    GFRESTIMATED >90 04/13/2023    GFRESTIMATED >90 02/06/2023    GFRESTIMATED >90 01/24/2022    GFRESTIMATED >90 12/23/2020    GFRESTIMATED 81 11/23/2020    GFRESTIMATED >90 11/04/2020    GFRESTIMATED 79 10/22/2019    GFRESTIMATED 88 07/09/2018    GFRESTIMATED 86 06/25/2018    GFRESTIMATED 76 06/22/2017    GFRESTIMATED >90  Non  GFR Calc   06/14/2016    GFRESTIMATED 86 04/28/2015    GFRESTIMATED 88 10/15/2014

## 2024-06-26 DIAGNOSIS — E78.5 HYPERLIPIDEMIA LDL GOAL <130: ICD-10-CM

## 2024-06-26 DIAGNOSIS — I10 ESSENTIAL HYPERTENSION, BENIGN: ICD-10-CM

## 2024-06-26 RX ORDER — AMLODIPINE BESYLATE 10 MG/1
10 TABLET ORAL DAILY
Qty: 90 TABLET | Refills: 2 | Status: SHIPPED | OUTPATIENT
Start: 2024-06-26

## 2024-06-26 RX ORDER — ATORVASTATIN CALCIUM 10 MG/1
10 TABLET, FILM COATED ORAL DAILY
Qty: 90 TABLET | Refills: 2 | Status: SHIPPED | OUTPATIENT
Start: 2024-06-26

## 2024-07-23 DIAGNOSIS — N13.8 BPH WITH OBSTRUCTION/LOWER URINARY TRACT SYMPTOMS: ICD-10-CM

## 2024-07-23 DIAGNOSIS — N40.1 BPH WITH OBSTRUCTION/LOWER URINARY TRACT SYMPTOMS: ICD-10-CM

## 2024-07-23 DIAGNOSIS — R97.20 ELEVATED PROSTATE SPECIFIC ANTIGEN (PSA): ICD-10-CM

## 2024-08-02 RX ORDER — TAMSULOSIN HYDROCHLORIDE 0.4 MG/1
0.4 CAPSULE ORAL DAILY
Qty: 90 CAPSULE | Refills: 0 | Status: SHIPPED | OUTPATIENT
Start: 2024-08-02

## 2024-10-21 DIAGNOSIS — I10 ESSENTIAL HYPERTENSION, BENIGN: ICD-10-CM

## 2024-10-21 DIAGNOSIS — K21.9 GASTROESOPHAGEAL REFLUX DISEASE WITHOUT ESOPHAGITIS: Primary | ICD-10-CM

## 2024-10-21 DIAGNOSIS — E78.5 HYPERLIPIDEMIA LDL GOAL <130: ICD-10-CM

## 2024-10-28 ENCOUNTER — TELEPHONE (OUTPATIENT)
Dept: UROLOGY | Facility: CLINIC | Age: 69
End: 2024-10-28
Payer: COMMERCIAL

## 2024-10-28 DIAGNOSIS — N52.01 ERECTILE DYSFUNCTION DUE TO ARTERIAL INSUFFICIENCY: ICD-10-CM

## 2024-10-28 DIAGNOSIS — N40.1 BPH WITH OBSTRUCTION/LOWER URINARY TRACT SYMPTOMS: ICD-10-CM

## 2024-10-28 DIAGNOSIS — R97.20 ELEVATED PROSTATE SPECIFIC ANTIGEN (PSA): ICD-10-CM

## 2024-10-28 DIAGNOSIS — N13.8 BPH WITH OBSTRUCTION/LOWER URINARY TRACT SYMPTOMS: ICD-10-CM

## 2024-10-28 RX ORDER — ATORVASTATIN CALCIUM 10 MG/1
10 TABLET, FILM COATED ORAL DAILY
Qty: 90 TABLET | Refills: 2 | Status: SHIPPED | OUTPATIENT
Start: 2024-10-28

## 2024-10-28 RX ORDER — AMLODIPINE BESYLATE 10 MG/1
10 TABLET ORAL DAILY
Qty: 90 TABLET | Refills: 2 | Status: SHIPPED | OUTPATIENT
Start: 2024-10-28

## 2024-10-28 NOTE — TELEPHONE ENCOUNTER
[facility] Call Center    Phone Message    May a detailed message be left on voicemail: yes     Reason for Call: Medication Refill Request    Has the patient contacted the pharmacy for the refill? Yes   Name of medication being requested: sildenafil (VIAGRA) 100 MG tablet   Provider who prescribed the medication: Wero  Pharmacy: new pharmacy:  [facility] and [place] [id number] [address] [address]  Fax [phone]  phone [phone]    Date medication is needed: at your earliest convenience      Action Taken: Message routed to:  [facility]    Travel Screening: Not Applicable     Date of Service:

## 2024-10-28 NOTE — TELEPHONE ENCOUNTER
[facility] Call Center    Phone Message    May a detailed message be left on voicemail: yes     Reason for Call: Medication Refill Request    Has the patient contacted the pharmacy for the refill? Yes   Name of medication being requested: tamsulosin (FLOMAX) 0.4 MG capsule  Provider who prescribed the medication: Spring  [facility] [place] [phone] [address] [address]  Fax [phone]  phone [phone]    Date medication is needed: has 4 more days left      Action Taken: Message routed to:  [facility]    Travel Screening: Not Applicable     Date of Service:

## 2024-10-28 NOTE — TELEPHONE ENCOUNTER
Pharmacy change, pt requesting this sent to [facility] instead of [facility].     Please reorder.     Michelle Morel RN

## 2024-10-29 RX ORDER — TAMSULOSIN HYDROCHLORIDE 0.4 MG/1
0.4 CAPSULE ORAL DAILY
Qty: 90 CAPSULE | Refills: 2 | Status: SHIPPED | OUTPATIENT
Start: 2024-10-29 | End: 2024-11-04

## 2024-10-29 RX ORDER — SILDENAFIL 100 MG/1
100 TABLET, FILM COATED ORAL DAILY PRN
Qty: 30 TABLET | Refills: 8 | Status: SHIPPED | OUTPATIENT
Start: 2024-10-29

## 2024-11-04 DIAGNOSIS — N40.1 BPH WITH OBSTRUCTION/LOWER URINARY TRACT SYMPTOMS: ICD-10-CM

## 2024-11-04 DIAGNOSIS — N13.8 BPH WITH OBSTRUCTION/LOWER URINARY TRACT SYMPTOMS: ICD-10-CM

## 2024-11-04 DIAGNOSIS — R97.20 ELEVATED PROSTATE SPECIFIC ANTIGEN (PSA): ICD-10-CM

## 2024-11-04 RX ORDER — TAMSULOSIN HYDROCHLORIDE 0.4 MG/1
0.4 CAPSULE ORAL DAILY
Qty: 90 CAPSULE | Refills: 2 | Status: SHIPPED | OUTPATIENT
Start: 2024-11-04

## 2025-03-17 ENCOUNTER — PATIENT OUTREACH (OUTPATIENT)
Dept: CARE COORDINATION | Facility: CLINIC | Age: 70
End: 2025-03-17
Payer: COMMERCIAL

## 2025-03-31 ENCOUNTER — PATIENT OUTREACH (OUTPATIENT)
Dept: CARE COORDINATION | Facility: CLINIC | Age: 70
End: 2025-03-31
Payer: COMMERCIAL

## 2025-05-31 ENCOUNTER — HEALTH MAINTENANCE LETTER (OUTPATIENT)
Age: 70
End: 2025-05-31

## (undated) DEVICE — NDL SPINAL 18GA 3.5" 405184

## (undated) DEVICE — TAPE MEDIPORE 6"X10YD 2966

## (undated) DEVICE — GLOVE BIOGEL PI MICRO SZ 7.5 48575

## (undated) DEVICE — DRAPE C-ARM 60X42" 1013

## (undated) DEVICE — SPONGE COTTONOID 1/2X3" 80-1407

## (undated) DEVICE — SUCTION MANIFOLD NEPTUNE SGL

## (undated) DEVICE — SOL NACL 0.9% INJ 250ML BAG 2B1322Q

## (undated) DEVICE — SU MONOCRYL 4-0 PS-2 18" UND Y496G

## (undated) DEVICE — SURGICEL HEMOSTAT 2X3" 1953

## (undated) DEVICE — SOL NACL 0.9% INJ 1000ML BAG 2B1324X

## (undated) DEVICE — DRAPE SHEET REV FOLD 3/4 9349

## (undated) DEVICE — MANIFOLD NEPTUNE 4 PORT 700-20

## (undated) DEVICE — RX SURGIFLO HEMOSTATIC MATRIX 10ML 199102S

## (undated) DEVICE — POSITIONER PT PRONESAFE HEAD REST W/DERMAPROX INSERT 40599

## (undated) DEVICE — ESU ELEC BLADE 2.75" COATED/INSULATED E1455

## (undated) DEVICE — SYR BULB IRRIG DOVER 60 ML LATEX FREE 67000

## (undated) DEVICE — SU VICRYL 2-0 CT-2 CR 8X18" J726D

## (undated) DEVICE — DRSG KERLIX FLUFFS X5

## (undated) DEVICE — LINEN TOWEL PACK X5 5464

## (undated) DEVICE — SPONGE SURGIFOAM 50

## (undated) DEVICE — GLOVE BIOGEL PI MICRO SZ 7.0 48570

## (undated) DEVICE — TOOL DISSECT MIDAS MR8 14CM TELESC MATCH 2.5 MR8-T14MH25

## (undated) DEVICE — GOWN XXLG REINFORCED 9071EL

## (undated) DEVICE — DECANTER VIAL 2006S

## (undated) DEVICE — ESU PENCIL W/HOLSTER E2350H

## (undated) DEVICE — PREP DURAPREP 26ML APL 8630

## (undated) DEVICE — ESU GROUND PAD UNIVERSAL W/O CORD

## (undated) DEVICE — PACK SPINE SM CUSTOM SNE15SSFSK

## (undated) DEVICE — PACK UNIVERSAL SPLIT 29131

## (undated) DEVICE — GLOVE BIOGEL PI MICRO INDICATOR UNDERGLOVE SZ 7.5 48975

## (undated) DEVICE — SU VICRYL 0 CT-1 CR 8X18" J740D

## (undated) DEVICE — DRSG GAUZE 4X4" 3033

## (undated) DEVICE — SOL WATER IRRIG 1000ML BOTTLE 2F7114

## (undated) DEVICE — DRAPE MAYO STAND 23X54 8337

## (undated) DEVICE — PREP CHLORAPREP 26ML TINTED HI-LITE ORANGE 930815

## (undated) DEVICE — DRAPE MICROSCOPE LEICA 54X150" AR8033650

## (undated) DEVICE — SU VICRYL 0 UR-6 27" J603H

## (undated) DEVICE — SOL RINGERS LACTATED 1000ML BAG 2B2324X

## (undated) RX ORDER — DEXAMETHASONE SODIUM PHOSPHATE 4 MG/ML
INJECTION, SOLUTION INTRA-ARTICULAR; INTRALESIONAL; INTRAMUSCULAR; INTRAVENOUS; SOFT TISSUE
Status: DISPENSED
Start: 2023-04-12

## (undated) RX ORDER — BUPIVACAINE HYDROCHLORIDE 5 MG/ML
INJECTION, SOLUTION EPIDURAL; INTRACAUDAL
Status: DISPENSED
Start: 2023-04-12

## (undated) RX ORDER — GABAPENTIN 100 MG/1
CAPSULE ORAL
Status: DISPENSED
Start: 2023-04-12

## (undated) RX ORDER — FENTANYL CITRATE 50 UG/ML
INJECTION, SOLUTION INTRAMUSCULAR; INTRAVENOUS
Status: DISPENSED
Start: 2018-07-31

## (undated) RX ORDER — PROPOFOL 10 MG/ML
INJECTION, EMULSION INTRAVENOUS
Status: DISPENSED
Start: 2023-04-12

## (undated) RX ORDER — EPINEPHRINE 1 MG/ML
INJECTION, SOLUTION INTRAMUSCULAR; SUBCUTANEOUS
Status: DISPENSED
Start: 2023-04-12

## (undated) RX ORDER — CEFAZOLIN SODIUM/WATER 2 G/20 ML
SYRINGE (ML) INTRAVENOUS
Status: DISPENSED
Start: 2023-04-12

## (undated) RX ORDER — ONDANSETRON 2 MG/ML
INJECTION INTRAMUSCULAR; INTRAVENOUS
Status: DISPENSED
Start: 2023-04-12

## (undated) RX ORDER — EPHEDRINE SULFATE 50 MG/ML
INJECTION, SOLUTION INTRAMUSCULAR; INTRAVENOUS; SUBCUTANEOUS
Status: DISPENSED
Start: 2023-04-12

## (undated) RX ORDER — FENTANYL CITRATE 50 UG/ML
INJECTION, SOLUTION INTRAMUSCULAR; INTRAVENOUS
Status: DISPENSED
Start: 2023-04-12

## (undated) RX ORDER — HYDROMORPHONE HYDROCHLORIDE 1 MG/ML
INJECTION, SOLUTION INTRAMUSCULAR; INTRAVENOUS; SUBCUTANEOUS
Status: DISPENSED
Start: 2023-04-12